# Patient Record
Sex: FEMALE | Race: WHITE | Employment: UNEMPLOYED | URBAN - METROPOLITAN AREA
[De-identification: names, ages, dates, MRNs, and addresses within clinical notes are randomized per-mention and may not be internally consistent; named-entity substitution may affect disease eponyms.]

---

## 2017-02-19 ENCOUNTER — HOSPITAL ENCOUNTER (INPATIENT)
Age: 27
LOS: 2 days | Discharge: HOME | DRG: 566 | End: 2017-02-21
Attending: EMERGENCY MEDICINE | Admitting: OBSTETRICS & GYNECOLOGY
Payer: MEDICARE

## 2017-02-19 DIAGNOSIS — F11.93 OPIATE WITHDRAWAL (HCC): Primary | ICD-10-CM

## 2017-02-19 DIAGNOSIS — Z3A.22 22 WEEKS GESTATION OF PREGNANCY: ICD-10-CM

## 2017-02-19 LAB
ABSOLUTE EOS #: 0 K/UL (ref 0–0.4)
ABSOLUTE LYMPH #: 0.8 K/UL (ref 1–4.8)
ABSOLUTE MONO #: 0.4 K/UL (ref 0.1–1.2)
ALBUMIN SERPL-MCNC: 3.7 G/DL (ref 3.5–5.2)
ALBUMIN/GLOBULIN RATIO: 1.4 (ref 1–2.5)
ALP BLD-CCNC: 103 U/L (ref 35–104)
ALT SERPL-CCNC: 15 U/L (ref 5–33)
AMPHETAMINE SCREEN URINE: NEGATIVE
ANION GAP SERPL CALCULATED.3IONS-SCNC: 14 MMOL/L (ref 9–17)
AST SERPL-CCNC: 15 U/L
BARBITURATE SCREEN URINE: NEGATIVE
BASOPHILS # BLD: 0 % (ref 0–2)
BASOPHILS ABSOLUTE: 0 K/UL (ref 0–0.2)
BENZODIAZEPINE SCREEN, URINE: POSITIVE
BILIRUB SERPL-MCNC: 0.71 MG/DL (ref 0.3–1.2)
BILIRUBIN DIRECT: 0.28 MG/DL
BILIRUBIN, INDIRECT: 0.43 MG/DL (ref 0–1)
BUN BLDV-MCNC: 4 MG/DL (ref 6–20)
BUN/CREAT BLD: ABNORMAL (ref 9–20)
BUPRENORPHINE URINE: ABNORMAL
CALCIUM SERPL-MCNC: 9.1 MG/DL (ref 8.6–10.4)
CANNABINOID SCREEN URINE: NEGATIVE
CHLORIDE BLD-SCNC: 100 MMOL/L (ref 98–107)
CO2: 24 MMOL/L (ref 20–31)
COCAINE METABOLITE, URINE: NEGATIVE
CREAT SERPL-MCNC: 0.63 MG/DL (ref 0.5–0.9)
DIFFERENTIAL TYPE: ABNORMAL
EOSINOPHILS RELATIVE PERCENT: 0 % (ref 1–4)
GFR AFRICAN AMERICAN: >60 ML/MIN
GFR NON-AFRICAN AMERICAN: >60 ML/MIN
GFR SERPL CREATININE-BSD FRML MDRD: ABNORMAL ML/MIN/{1.73_M2}
GFR SERPL CREATININE-BSD FRML MDRD: ABNORMAL ML/MIN/{1.73_M2}
GLOBULIN: NORMAL G/DL (ref 1.5–3.8)
GLUCOSE BLD-MCNC: 108 MG/DL (ref 70–99)
HCT VFR BLD CALC: 31.2 % (ref 36–46)
HEMOGLOBIN: 11.1 G/DL (ref 12–16)
LIPASE: 24 U/L (ref 13–60)
LYMPHOCYTES # BLD: 9 % (ref 24–44)
MCH RBC QN AUTO: 31.3 PG (ref 26–34)
MCHC RBC AUTO-ENTMCNC: 35.6 G/DL (ref 31–37)
MCV RBC AUTO: 87.8 FL (ref 80–100)
MDMA URINE: ABNORMAL
METHADONE SCREEN, URINE: POSITIVE
METHAMPHETAMINE, URINE: ABNORMAL
MONOCYTES # BLD: 5 % (ref 2–11)
OPIATES, URINE: POSITIVE
OXYCODONE SCREEN URINE: NEGATIVE
PDW BLD-RTO: 13.9 % (ref 12.5–15.4)
PHENCYCLIDINE, URINE: NEGATIVE
PLATELET # BLD: 134 K/UL (ref 140–450)
PLATELET ESTIMATE: ABNORMAL
PMV BLD AUTO: 8.8 FL (ref 6–12)
POTASSIUM SERPL-SCNC: 3.2 MMOL/L (ref 3.7–5.3)
PROPOXYPHENE, URINE: ABNORMAL
RBC # BLD: 3.55 M/UL (ref 4–5.2)
RBC # BLD: ABNORMAL 10*6/UL
SEG NEUTROPHILS: 86 % (ref 36–66)
SEGMENTED NEUTROPHILS ABSOLUTE COUNT: 7.3 K/UL (ref 1.8–7.7)
SODIUM BLD-SCNC: 138 MMOL/L (ref 135–144)
TEST INFORMATION: ABNORMAL
TOTAL PROTEIN: 6.4 G/DL (ref 6.4–8.3)
TRICYCLIC ANTIDEPRESSANTS, UR: ABNORMAL
WBC # BLD: 8.5 K/UL (ref 3.5–11)
WBC # BLD: ABNORMAL 10*3/UL

## 2017-02-19 PROCEDURE — 2580000003 HC RX 258: Performed by: EMERGENCY MEDICINE

## 2017-02-19 PROCEDURE — 85025 COMPLETE CBC W/AUTO DIFF WBC: CPT

## 2017-02-19 PROCEDURE — 99285 EMERGENCY DEPT VISIT HI MDM: CPT

## 2017-02-19 PROCEDURE — 81001 URINALYSIS AUTO W/SCOPE: CPT

## 2017-02-19 PROCEDURE — 80076 HEPATIC FUNCTION PANEL: CPT

## 2017-02-19 PROCEDURE — 6360000002 HC RX W HCPCS: Performed by: EMERGENCY MEDICINE

## 2017-02-19 PROCEDURE — 80048 BASIC METABOLIC PNL TOTAL CA: CPT

## 2017-02-19 PROCEDURE — 1220000000 HC SEMI PRIVATE OB R&B

## 2017-02-19 PROCEDURE — 80307 DRUG TEST PRSMV CHEM ANLYZR: CPT

## 2017-02-19 PROCEDURE — 83690 ASSAY OF LIPASE: CPT

## 2017-02-19 RX ORDER — OMEPRAZOLE 10 MG/1
10 CAPSULE, DELAYED RELEASE ORAL
Status: DISCONTINUED | OUTPATIENT
Start: 2017-02-20 | End: 2017-02-20 | Stop reason: SDUPTHER

## 2017-02-19 RX ORDER — ONDANSETRON 2 MG/ML
4 INJECTION INTRAMUSCULAR; INTRAVENOUS EVERY 6 HOURS PRN
Status: DISCONTINUED | OUTPATIENT
Start: 2017-02-19 | End: 2017-02-21 | Stop reason: HOSPADM

## 2017-02-19 RX ORDER — ONDANSETRON 2 MG/ML
4 INJECTION INTRAMUSCULAR; INTRAVENOUS ONCE
Status: COMPLETED | OUTPATIENT
Start: 2017-02-19 | End: 2017-02-19

## 2017-02-19 RX ORDER — POTASSIUM CHLORIDE 7.45 MG/ML
10 INJECTION INTRAVENOUS PRN
Status: DISCONTINUED | OUTPATIENT
Start: 2017-02-19 | End: 2017-02-21 | Stop reason: HOSPADM

## 2017-02-19 RX ORDER — OXYCODONE HYDROCHLORIDE AND ACETAMINOPHEN 5; 325 MG/1; MG/1
1 TABLET ORAL EVERY 4 HOURS PRN
Status: DISCONTINUED | OUTPATIENT
Start: 2017-02-19 | End: 2017-02-20

## 2017-02-19 RX ORDER — LOPERAMIDE HYDROCHLORIDE 2 MG/1
2 CAPSULE ORAL 4 TIMES DAILY PRN
Status: DISCONTINUED | OUTPATIENT
Start: 2017-02-19 | End: 2017-02-21 | Stop reason: HOSPADM

## 2017-02-19 RX ORDER — NAPROXEN 250 MG/1
250 TABLET ORAL
Status: DISCONTINUED | OUTPATIENT
Start: 2017-02-20 | End: 2017-02-20

## 2017-02-19 RX ORDER — DICYCLOMINE HYDROCHLORIDE 10 MG/1
20 CAPSULE ORAL EVERY 6 HOURS PRN
Status: DISCONTINUED | OUTPATIENT
Start: 2017-02-19 | End: 2017-02-21 | Stop reason: HOSPADM

## 2017-02-19 RX ORDER — CYCLOBENZAPRINE HCL 10 MG
10 TABLET ORAL 3 TIMES DAILY PRN
Status: DISCONTINUED | OUTPATIENT
Start: 2017-02-19 | End: 2017-02-21 | Stop reason: HOSPADM

## 2017-02-19 RX ORDER — 0.9 % SODIUM CHLORIDE 0.9 %
1000 INTRAVENOUS SOLUTION INTRAVENOUS ONCE
Status: COMPLETED | OUTPATIENT
Start: 2017-02-19 | End: 2017-02-19

## 2017-02-19 RX ORDER — SODIUM CHLORIDE 0.9 % (FLUSH) 0.9 %
10 SYRINGE (ML) INJECTION EVERY 12 HOURS SCHEDULED
Status: DISCONTINUED | OUTPATIENT
Start: 2017-02-19 | End: 2017-02-21 | Stop reason: HOSPADM

## 2017-02-19 RX ORDER — ACETAMINOPHEN 325 MG/1
650 TABLET ORAL EVERY 4 HOURS PRN
Status: DISCONTINUED | OUTPATIENT
Start: 2017-02-19 | End: 2017-02-21 | Stop reason: HOSPADM

## 2017-02-19 RX ORDER — ALBUTEROL SULFATE 90 UG/1
2 AEROSOL, METERED RESPIRATORY (INHALATION) EVERY 6 HOURS PRN
Status: DISCONTINUED | OUTPATIENT
Start: 2017-02-19 | End: 2017-02-21 | Stop reason: HOSPADM

## 2017-02-19 RX ORDER — SODIUM CHLORIDE 0.9 % (FLUSH) 0.9 %
10 SYRINGE (ML) INJECTION PRN
Status: DISCONTINUED | OUTPATIENT
Start: 2017-02-19 | End: 2017-02-21 | Stop reason: HOSPADM

## 2017-02-19 RX ADMIN — ONDANSETRON 4 MG: 2 INJECTION, SOLUTION INTRAMUSCULAR; INTRAVENOUS at 22:00

## 2017-02-19 RX ADMIN — SODIUM CHLORIDE 1000 ML: 9 INJECTION, SOLUTION INTRAVENOUS at 21:57

## 2017-02-20 PROBLEM — N39.0 UTI (URINARY TRACT INFECTION): Status: ACTIVE | Noted: 2017-02-20

## 2017-02-20 LAB
-: ABNORMAL
AMORPHOUS: ABNORMAL
ANION GAP SERPL CALCULATED.3IONS-SCNC: 10 MMOL/L (ref 9–17)
BACTERIA: ABNORMAL
BILIRUBIN URINE: NEGATIVE
BUN BLDV-MCNC: 6 MG/DL (ref 6–20)
BUN/CREAT BLD: ABNORMAL (ref 9–20)
CALCIUM SERPL-MCNC: 7.8 MG/DL (ref 8.6–10.4)
CASTS UA: ABNORMAL /LPF (ref 0–8)
CHLORIDE BLD-SCNC: 104 MMOL/L (ref 98–107)
CO2: 22 MMOL/L (ref 20–31)
COLOR: ABNORMAL
CREAT SERPL-MCNC: 0.64 MG/DL (ref 0.5–0.9)
CRYSTALS, UA: ABNORMAL /HPF
EPITHELIAL CELLS UA: ABNORMAL /HPF (ref 0–5)
GFR AFRICAN AMERICAN: >60 ML/MIN
GFR NON-AFRICAN AMERICAN: >60 ML/MIN
GFR SERPL CREATININE-BSD FRML MDRD: ABNORMAL ML/MIN/{1.73_M2}
GFR SERPL CREATININE-BSD FRML MDRD: ABNORMAL ML/MIN/{1.73_M2}
GLUCOSE BLD-MCNC: 94 MG/DL (ref 70–99)
GLUCOSE URINE: NEGATIVE
KETONES, URINE: ABNORMAL
LEUKOCYTE ESTERASE, URINE: ABNORMAL
MUCUS: ABNORMAL
NITRITE, URINE: POSITIVE
OTHER OBSERVATIONS UA: ABNORMAL
PH UA: 6 (ref 5–8)
POTASSIUM SERPL-SCNC: 3.9 MMOL/L (ref 3.7–5.3)
PROTEIN UA: ABNORMAL
RBC UA: ABNORMAL /HPF (ref 0–4)
RENAL EPITHELIAL, UA: ABNORMAL /HPF
SODIUM BLD-SCNC: 136 MMOL/L (ref 135–144)
SPECIFIC GRAVITY UA: 1.02 (ref 1–1.03)
TRICHOMONAS: ABNORMAL
TURBIDITY: ABNORMAL
URINE HGB: ABNORMAL
UROBILINOGEN, URINE: NORMAL
WBC UA: ABNORMAL /HPF (ref 0–5)
YEAST: ABNORMAL

## 2017-02-20 PROCEDURE — 2580000003 HC RX 258: Performed by: STUDENT IN AN ORGANIZED HEALTH CARE EDUCATION/TRAINING PROGRAM

## 2017-02-20 PROCEDURE — 86900 BLOOD TYPING SEROLOGIC ABO: CPT

## 2017-02-20 PROCEDURE — 1220000000 HC SEMI PRIVATE OB R&B

## 2017-02-20 PROCEDURE — 86762 RUBELLA ANTIBODY: CPT

## 2017-02-20 PROCEDURE — 6360000002 HC RX W HCPCS: Performed by: STUDENT IN AN ORGANIZED HEALTH CARE EDUCATION/TRAINING PROGRAM

## 2017-02-20 PROCEDURE — 6370000000 HC RX 637 (ALT 250 FOR IP): Performed by: STUDENT IN AN ORGANIZED HEALTH CARE EDUCATION/TRAINING PROGRAM

## 2017-02-20 PROCEDURE — 99222 1ST HOSP IP/OBS MODERATE 55: CPT | Performed by: OBSTETRICS & GYNECOLOGY

## 2017-02-20 PROCEDURE — 80048 BASIC METABOLIC PNL TOTAL CA: CPT

## 2017-02-20 PROCEDURE — 85025 COMPLETE CBC W/AUTO DIFF WBC: CPT

## 2017-02-20 PROCEDURE — 86780 TREPONEMA PALLIDUM: CPT

## 2017-02-20 PROCEDURE — 87389 HIV-1 AG W/HIV-1&-2 AB AG IA: CPT

## 2017-02-20 PROCEDURE — 87522 HEPATITIS C REVRS TRNSCRPJ: CPT

## 2017-02-20 PROCEDURE — 6370000000 HC RX 637 (ALT 250 FOR IP): Performed by: OBSTETRICS & GYNECOLOGY

## 2017-02-20 PROCEDURE — 86850 RBC ANTIBODY SCREEN: CPT

## 2017-02-20 PROCEDURE — 36415 COLL VENOUS BLD VENIPUNCTURE: CPT

## 2017-02-20 PROCEDURE — 87340 HEPATITIS B SURFACE AG IA: CPT

## 2017-02-20 PROCEDURE — 86901 BLOOD TYPING SEROLOGIC RH(D): CPT

## 2017-02-20 RX ORDER — POTASSIUM CHLORIDE 20 MEQ/1
40 TABLET, EXTENDED RELEASE ORAL ONCE
Status: COMPLETED | OUTPATIENT
Start: 2017-02-20 | End: 2017-02-20

## 2017-02-20 RX ORDER — METHADONE HYDROCHLORIDE 5 MG/5ML
35 SOLUTION ORAL DAILY
Status: DISCONTINUED | OUTPATIENT
Start: 2017-02-20 | End: 2017-02-20

## 2017-02-20 RX ORDER — PANTOPRAZOLE SODIUM 40 MG/1
40 TABLET, DELAYED RELEASE ORAL
Status: DISCONTINUED | OUTPATIENT
Start: 2017-02-20 | End: 2017-02-21 | Stop reason: HOSPADM

## 2017-02-20 RX ORDER — POTASSIUM CHLORIDE 20 MEQ/1
10 TABLET, EXTENDED RELEASE ORAL 2 TIMES DAILY
Status: DISCONTINUED | OUTPATIENT
Start: 2017-02-20 | End: 2017-02-20

## 2017-02-20 RX ORDER — NAPROXEN 250 MG/1
250 TABLET ORAL 3 TIMES DAILY PRN
Status: DISCONTINUED | OUTPATIENT
Start: 2017-02-20 | End: 2017-02-21 | Stop reason: HOSPADM

## 2017-02-20 RX ORDER — 0.9 % SODIUM CHLORIDE 0.9 %
1000 INTRAVENOUS SOLUTION INTRAVENOUS ONCE
Status: COMPLETED | OUTPATIENT
Start: 2017-02-20 | End: 2017-02-20

## 2017-02-20 RX ORDER — SODIUM CHLORIDE 9 MG/ML
125 INJECTION, SOLUTION INTRAVENOUS CONTINUOUS
Status: DISCONTINUED | OUTPATIENT
Start: 2017-02-20 | End: 2017-02-21 | Stop reason: HOSPADM

## 2017-02-20 RX ORDER — METHADONE HYDROCHLORIDE 5 MG/5ML
88 SOLUTION ORAL DAILY
Status: DISCONTINUED | OUTPATIENT
Start: 2017-02-20 | End: 2017-02-20

## 2017-02-20 RX ORDER — METHADONE HYDROCHLORIDE 5 MG/5ML
84 SOLUTION ORAL DAILY
Status: DISCONTINUED | OUTPATIENT
Start: 2017-02-20 | End: 2017-02-21 | Stop reason: HOSPADM

## 2017-02-20 RX ORDER — NITROFURANTOIN 25; 75 MG/1; MG/1
100 CAPSULE ORAL EVERY 12 HOURS SCHEDULED
Status: DISCONTINUED | OUTPATIENT
Start: 2017-02-20 | End: 2017-02-21 | Stop reason: HOSPADM

## 2017-02-20 RX ADMIN — Medication 84 MG: at 10:39

## 2017-02-20 RX ADMIN — NAPROXEN 250 MG: 250 TABLET ORAL at 08:13

## 2017-02-20 RX ADMIN — Medication 10 ML: at 00:57

## 2017-02-20 RX ADMIN — SODIUM CHLORIDE 125 ML/HR: 9 INJECTION, SOLUTION INTRAVENOUS at 11:33

## 2017-02-20 RX ADMIN — ONDANSETRON 4 MG: 2 INJECTION, SOLUTION INTRAMUSCULAR; INTRAVENOUS at 01:05

## 2017-02-20 RX ADMIN — NITROFURANTOIN MONOHYDRATE AND NITROFURANTOIN MACROCRYSTALLINE 100 MG: 75; 25 CAPSULE ORAL at 20:35

## 2017-02-20 RX ADMIN — LOPERAMIDE HYDROCHLORIDE 2 MG: 2 CAPSULE ORAL at 16:39

## 2017-02-20 RX ADMIN — OXYCODONE HYDROCHLORIDE AND ACETAMINOPHEN 1 TABLET: 5; 325 TABLET ORAL at 05:08

## 2017-02-20 RX ADMIN — OXYCODONE HYDROCHLORIDE AND ACETAMINOPHEN 1 TABLET: 5; 325 TABLET ORAL at 00:54

## 2017-02-20 RX ADMIN — POTASSIUM CHLORIDE 10 MEQ: 1500 TABLET, EXTENDED RELEASE ORAL at 16:36

## 2017-02-20 RX ADMIN — PANTOPRAZOLE SODIUM 40 MG: 40 TABLET, DELAYED RELEASE ORAL at 08:14

## 2017-02-20 RX ADMIN — Medication 10 ML: at 08:14

## 2017-02-20 RX ADMIN — DICYCLOMINE HYDROCHLORIDE 20 MG: 10 CAPSULE ORAL at 09:05

## 2017-02-20 RX ADMIN — DICYCLOMINE HYDROCHLORIDE 20 MG: 10 CAPSULE ORAL at 03:12

## 2017-02-20 RX ADMIN — CYCLOBENZAPRINE HYDROCHLORIDE 10 MG: 10 TABLET, FILM COATED ORAL at 03:12

## 2017-02-20 RX ADMIN — OXYCODONE HYDROCHLORIDE AND ACETAMINOPHEN 1 TABLET: 5; 325 TABLET ORAL at 09:07

## 2017-02-20 RX ADMIN — NITROFURANTOIN MONOHYDRATE AND NITROFURANTOIN MACROCRYSTALLINE 100 MG: 75; 25 CAPSULE ORAL at 08:14

## 2017-02-20 RX ADMIN — POTASSIUM CHLORIDE 40 MEQ: 1500 TABLET, EXTENDED RELEASE ORAL at 18:34

## 2017-02-20 RX ADMIN — SODIUM CHLORIDE 1000 ML: 9 INJECTION, SOLUTION INTRAVENOUS at 18:21

## 2017-02-20 ASSESSMENT — ENCOUNTER SYMPTOMS
ABDOMINAL PAIN: 1
COUGH: 0
PHOTOPHOBIA: 0
NAUSEA: 1
CONSTIPATION: 0
SHORTNESS OF BREATH: 0
DIARRHEA: 1
VOMITING: 1
CHEST TIGHTNESS: 0

## 2017-02-20 ASSESSMENT — PAIN SCALES - GENERAL
PAINLEVEL_OUTOF10: 10

## 2017-02-21 VITALS
HEIGHT: 62 IN | TEMPERATURE: 98.2 F | RESPIRATION RATE: 16 BRPM | WEIGHT: 200 LBS | BODY MASS INDEX: 36.8 KG/M2 | OXYGEN SATURATION: 96 % | SYSTOLIC BLOOD PRESSURE: 84 MMHG | DIASTOLIC BLOOD PRESSURE: 45 MMHG | HEART RATE: 80 BPM

## 2017-02-21 LAB
ABO/RH: NORMAL
ABSOLUTE EOS #: 0 K/UL (ref 0–0.4)
ABSOLUTE LYMPH #: 1 K/UL (ref 1–4.8)
ABSOLUTE MONO #: 0.7 K/UL (ref 0.1–1.2)
ANTIBODY SCREEN: NEGATIVE
BASOPHILS # BLD: 0 % (ref 0–2)
BASOPHILS ABSOLUTE: 0 K/UL (ref 0–0.2)
DIFFERENTIAL TYPE: ABNORMAL
EOSINOPHILS RELATIVE PERCENT: 1 % (ref 1–4)
HCT VFR BLD CALC: 22.8 % (ref 36–46)
HEMOGLOBIN: 8.2 G/DL (ref 12–16)
HEPATITIS B SURFACE ANTIGEN: NONREACTIVE
HIV AG/AB: NONREACTIVE
LYMPHOCYTES # BLD: 17 % (ref 24–44)
MCH RBC QN AUTO: 31.1 PG (ref 26–34)
MCHC RBC AUTO-ENTMCNC: 35.8 G/DL (ref 31–37)
MCV RBC AUTO: 86.6 FL (ref 80–100)
MONOCYTES # BLD: 12 % (ref 2–11)
PDW BLD-RTO: 13.9 % (ref 12.5–15.4)
PLATELET # BLD: 112 K/UL (ref 140–450)
PLATELET ESTIMATE: ABNORMAL
PMV BLD AUTO: 9.1 FL (ref 6–12)
RBC # BLD: 2.64 M/UL (ref 4–5.2)
RBC # BLD: ABNORMAL 10*6/UL
RUBV IGG SER QL: 24 IU/ML
SEG NEUTROPHILS: 70 % (ref 36–66)
SEGMENTED NEUTROPHILS ABSOLUTE COUNT: 4.1 K/UL (ref 1.8–7.7)
T. PALLIDUM, IGG: NONREACTIVE
WBC # BLD: 5.9 K/UL (ref 3.5–11)
WBC # BLD: ABNORMAL 10*3/UL

## 2017-02-21 PROCEDURE — 6370000000 HC RX 637 (ALT 250 FOR IP): Performed by: STUDENT IN AN ORGANIZED HEALTH CARE EDUCATION/TRAINING PROGRAM

## 2017-02-21 PROCEDURE — 96374 THER/PROPH/DIAG INJ IV PUSH: CPT

## 2017-02-21 PROCEDURE — 96361 HYDRATE IV INFUSION ADD-ON: CPT

## 2017-02-21 PROCEDURE — 99238 HOSP IP/OBS DSCHRG MGMT 30/<: CPT | Performed by: OBSTETRICS & GYNECOLOGY

## 2017-02-21 RX ORDER — NITROFURANTOIN 25; 75 MG/1; MG/1
100 CAPSULE ORAL EVERY 12 HOURS SCHEDULED
Qty: 20 CAPSULE | Refills: 0 | Status: SHIPPED | OUTPATIENT
Start: 2017-02-21 | End: 2017-02-27 | Stop reason: ALTCHOICE

## 2017-02-21 RX ORDER — CEPHALEXIN 500 MG/1
500 CAPSULE ORAL 4 TIMES DAILY
Qty: 40 CAPSULE | Refills: 0 | Status: SHIPPED | OUTPATIENT
Start: 2017-02-21 | End: 2017-02-21 | Stop reason: HOSPADM

## 2017-02-21 RX ADMIN — NITROFURANTOIN MONOHYDRATE AND NITROFURANTOIN MACROCRYSTALLINE 100 MG: 75; 25 CAPSULE ORAL at 09:21

## 2017-02-21 RX ADMIN — PANTOPRAZOLE SODIUM 40 MG: 40 TABLET, DELAYED RELEASE ORAL at 08:02

## 2017-02-22 LAB
DIRECT EXAM: ABNORMAL
Lab: ABNORMAL
SPECIMEN DESCRIPTION: ABNORMAL
STATUS: ABNORMAL

## 2017-02-27 ENCOUNTER — OFFICE VISIT (OUTPATIENT)
Dept: OBGYN | Facility: CLINIC | Age: 27
End: 2017-02-27

## 2017-02-27 ENCOUNTER — HOSPITAL ENCOUNTER (OUTPATIENT)
Age: 27
Setting detail: SPECIMEN
Discharge: HOME | End: 2017-02-27
Payer: MEDICARE

## 2017-02-27 VITALS
DIASTOLIC BLOOD PRESSURE: 57 MMHG | RESPIRATION RATE: 18 BRPM | WEIGHT: 198 LBS | SYSTOLIC BLOOD PRESSURE: 104 MMHG | HEIGHT: 64 IN | HEART RATE: 74 BPM | BODY MASS INDEX: 33.8 KG/M2

## 2017-02-27 DIAGNOSIS — F19.10 DRUG ABUSE (HCC): Primary | ICD-10-CM

## 2017-02-27 DIAGNOSIS — Z3A.23 23 WEEKS GESTATION OF PREGNANCY: ICD-10-CM

## 2017-02-27 DIAGNOSIS — O09.32 LATE PRENATAL CARE AFFECTING PREGNANCY IN SECOND TRIMESTER: ICD-10-CM

## 2017-02-27 LAB
AMPHETAMINE SCREEN URINE: NEGATIVE
BARBITURATE SCREEN URINE: NEGATIVE
BENZODIAZEPINE SCREEN, URINE: NEGATIVE
BUPRENORPHINE URINE: ABNORMAL
CANNABINOID SCREEN URINE: NEGATIVE
COCAINE METABOLITE, URINE: NEGATIVE
MDMA URINE: ABNORMAL
METHADONE SCREEN, URINE: POSITIVE
METHAMPHETAMINE, URINE: ABNORMAL
OPIATES, URINE: NEGATIVE
OXYCODONE SCREEN URINE: NEGATIVE
PHENCYCLIDINE, URINE: NEGATIVE
PROPOXYPHENE, URINE: ABNORMAL
TEST INFORMATION: ABNORMAL
TRICYCLIC ANTIDEPRESSANTS, UR: ABNORMAL

## 2017-02-27 PROCEDURE — 99213 OFFICE O/P EST LOW 20 MIN: CPT | Performed by: SPECIALIST

## 2017-02-27 RX ORDER — VITAMIN A ACETATE, .BETA.-CAROTENE, ASCORBIC ACID, CHOLECALCIFEROL, .ALPHA.-TOCOPHEROL ACETATE, DL-, THIAMINE MONONITRATE, RIBOFLAVIN, NIACINAMIDE, PYRIDOXINE HYDROCHLORIDE, FOLIC ACID, CYANOCOBALAMIN, CALCIUM CARBONATE, FERROUS FUMARATE, ZINC OXIDE, AND CUPRIC OXIDE 2000; 2000; 120; 400; 22; 1.84; 3; 20; 10; 1; 12; 200; 27; 25; 2 [IU]/1; [IU]/1; MG/1; [IU]/1; MG/1; MG/1; MG/1; MG/1; MG/1; MG/1; UG/1; MG/1; MG/1; MG/1; MG/1
1 TABLET ORAL DAILY
Qty: 30 TABLET | Refills: 11 | Status: SHIPPED | OUTPATIENT
Start: 2017-02-27 | End: 2017-06-30 | Stop reason: ALTCHOICE

## 2017-02-27 RX ORDER — PROMETHAZINE HYDROCHLORIDE 25 MG/1
25 TABLET ORAL EVERY 6 HOURS PRN
Qty: 30 TABLET | Refills: 1 | Status: SHIPPED | OUTPATIENT
Start: 2017-02-27 | End: 2017-07-31 | Stop reason: SDUPTHER

## 2017-02-27 ASSESSMENT — ENCOUNTER SYMPTOMS
DIARRHEA: 0
EYE PAIN: 0
VOMITING: 0
CONSTIPATION: 0
APNEA: 0
COUGH: 0
NAUSEA: 0
ABDOMINAL DISTENTION: 0
ABDOMINAL PAIN: 0

## 2017-03-03 ENCOUNTER — PROCEDURE VISIT (OUTPATIENT)
Dept: OBGYN | Facility: CLINIC | Age: 27
End: 2017-03-03

## 2017-03-03 DIAGNOSIS — Z36.89 ENCOUNTER FOR FETAL ANATOMIC SURVEY: Primary | ICD-10-CM

## 2017-03-03 DIAGNOSIS — Z3A.23 23 WEEKS GESTATION OF PREGNANCY: ICD-10-CM

## 2017-03-03 DIAGNOSIS — O09.32 LATE PRENATAL CARE AFFECTING PREGNANCY IN SECOND TRIMESTER: ICD-10-CM

## 2017-03-03 DIAGNOSIS — Z3A.24 24 WEEKS GESTATION OF PREGNANCY: ICD-10-CM

## 2017-03-03 PROCEDURE — 76805 OB US >/= 14 WKS SNGL FETUS: CPT | Performed by: SPECIALIST

## 2017-03-09 ENCOUNTER — INITIAL PRENATAL (OUTPATIENT)
Dept: OBGYN | Facility: CLINIC | Age: 27
End: 2017-03-09

## 2017-03-09 ENCOUNTER — HOSPITAL ENCOUNTER (OUTPATIENT)
Age: 27
Setting detail: SPECIMEN
Discharge: HOME | End: 2017-03-09
Payer: MEDICARE

## 2017-03-09 VITALS
HEART RATE: 86 BPM | SYSTOLIC BLOOD PRESSURE: 131 MMHG | BODY MASS INDEX: 33.64 KG/M2 | WEIGHT: 196 LBS | DIASTOLIC BLOOD PRESSURE: 75 MMHG

## 2017-03-09 DIAGNOSIS — O99.323 METHADONE MAINTENANCE TREATMENT AFFECTING PREGNANCY IN THIRD TRIMESTER (HCC): ICD-10-CM

## 2017-03-09 DIAGNOSIS — J45.20 MILD INTERMITTENT ASTHMA WITHOUT COMPLICATION: ICD-10-CM

## 2017-03-09 DIAGNOSIS — O34.42 HISTORY OF CERVICAL LEEP BIOPSY AFFECTING CARE OF MOTHER, ANTEPARTUM, SECOND TRIMESTER: ICD-10-CM

## 2017-03-09 DIAGNOSIS — F19.10 DRUG ABUSE (HCC): ICD-10-CM

## 2017-03-09 DIAGNOSIS — B19.20 HEPATITIS C VIRUS INFECTION, UNSPECIFIED CHRONICITY: ICD-10-CM

## 2017-03-09 DIAGNOSIS — F11.20 METHADONE MAINTENANCE TREATMENT AFFECTING PREGNANCY IN THIRD TRIMESTER (HCC): ICD-10-CM

## 2017-03-09 DIAGNOSIS — Z3A.25 25 WEEKS GESTATION OF PREGNANCY: ICD-10-CM

## 2017-03-09 DIAGNOSIS — O09.92 HRP (HIGH RISK PREGNANCY), SECOND TRIMESTER: Primary | ICD-10-CM

## 2017-03-09 LAB
ABSOLUTE EOS #: 0.2 K/UL (ref 0–0.4)
ABSOLUTE LYMPH #: 1.5 K/UL (ref 1–4.8)
ABSOLUTE MONO #: 0.4 K/UL (ref 0.1–1.2)
BASOPHILS # BLD: 0 % (ref 0–2)
BASOPHILS ABSOLUTE: 0 K/UL (ref 0–0.2)
BILIRUBIN URINE: NEGATIVE
COLOR: YELLOW
COMMENT UA: NORMAL
DIFFERENTIAL TYPE: ABNORMAL
EOSINOPHILS RELATIVE PERCENT: 3 % (ref 1–4)
GLUCOSE URINE: NEGATIVE
HCT VFR BLD CALC: 28 % (ref 36–46)
HEMOGLOBIN: 9.7 G/DL (ref 12–16)
HEPATITIS B SURFACE ANTIGEN: NONREACTIVE
HIV AG/AB: NONREACTIVE
KETONES, URINE: NEGATIVE
LEUKOCYTE ESTERASE, URINE: NEGATIVE
LYMPHOCYTES # BLD: 24 % (ref 24–44)
MCH RBC QN AUTO: 31.2 PG (ref 26–34)
MCHC RBC AUTO-ENTMCNC: 34.5 G/DL (ref 31–37)
MCV RBC AUTO: 90.5 FL (ref 80–100)
MONOCYTES # BLD: 7 % (ref 2–11)
NITRITE, URINE: NEGATIVE
PDW BLD-RTO: 14.5 % (ref 12.5–15.4)
PH UA: 7 (ref 5–8)
PLATELET # BLD: 99 K/UL (ref 140–450)
PLATELET ESTIMATE: ABNORMAL
PMV BLD AUTO: 9.6 FL (ref 6–12)
PROTEIN UA: NEGATIVE
RBC # BLD: 3.1 M/UL (ref 4–5.2)
RBC # BLD: ABNORMAL 10*6/UL
RUBV IGG SER QL: 36.2 IU/ML
SEG NEUTROPHILS: 66 % (ref 36–66)
SEGMENTED NEUTROPHILS ABSOLUTE COUNT: 4.2 K/UL (ref 1.8–7.7)
SICKLE CELL SCREEN: NEGATIVE
SPECIFIC GRAVITY UA: 1.02 (ref 1–1.03)
T. PALLIDUM, IGG: NONREACTIVE
TSH SERPL DL<=0.05 MIU/L-ACNC: 0.9 MIU/L (ref 0.3–5)
TURBIDITY: CLEAR
URINE HGB: NEGATIVE
UROBILINOGEN, URINE: NORMAL
WBC # BLD: 6.3 K/UL (ref 3.5–11)
WBC # BLD: ABNORMAL 10*3/UL

## 2017-03-09 PROCEDURE — 99211 OFF/OP EST MAY X REQ PHY/QHP: CPT | Performed by: SPECIALIST

## 2017-03-09 PROCEDURE — H1000 PRENATAL CARE ATRISK ASSESSM: HCPCS | Performed by: SPECIALIST

## 2017-03-10 LAB
ABO/RH: NORMAL
ANTIBODY SCREEN: NEGATIVE
C. TRACHOMATIS DNA ,URINE: NEGATIVE
N. GONORRHOEAE DNA, URINE: NEGATIVE

## 2017-03-16 ENCOUNTER — ROUTINE PRENATAL (OUTPATIENT)
Dept: OBGYN CLINIC | Age: 27
End: 2017-03-16
Payer: MEDICARE

## 2017-03-16 VITALS
SYSTOLIC BLOOD PRESSURE: 112 MMHG | DIASTOLIC BLOOD PRESSURE: 68 MMHG | WEIGHT: 196 LBS | HEART RATE: 85 BPM | BODY MASS INDEX: 33.64 KG/M2

## 2017-03-16 DIAGNOSIS — Z67.91 RH NEGATIVE STATUS DURING PREGNANCY, SECOND TRIMESTER: Primary | ICD-10-CM

## 2017-03-16 DIAGNOSIS — O26.892 RH NEGATIVE STATUS DURING PREGNANCY, SECOND TRIMESTER: Primary | ICD-10-CM

## 2017-03-16 DIAGNOSIS — O09.93 HRP (HIGH RISK PREGNANCY), THIRD TRIMESTER: ICD-10-CM

## 2017-03-16 DIAGNOSIS — F11.20 METHADONE MAINTENANCE TREATMENT AFFECTING PREGNANCY IN THIRD TRIMESTER (HCC): ICD-10-CM

## 2017-03-16 DIAGNOSIS — O99.323 METHADONE MAINTENANCE TREATMENT AFFECTING PREGNANCY IN THIRD TRIMESTER (HCC): ICD-10-CM

## 2017-03-16 DIAGNOSIS — J45.20 MILD INTERMITTENT ASTHMA WITHOUT COMPLICATION: ICD-10-CM

## 2017-03-16 PROCEDURE — 99213 OFFICE O/P EST LOW 20 MIN: CPT | Performed by: SPECIALIST

## 2017-03-23 ENCOUNTER — ROUTINE PRENATAL (OUTPATIENT)
Dept: OBGYN CLINIC | Age: 27
End: 2017-03-23
Payer: MEDICARE

## 2017-03-23 VITALS
HEART RATE: 77 BPM | WEIGHT: 199 LBS | DIASTOLIC BLOOD PRESSURE: 63 MMHG | SYSTOLIC BLOOD PRESSURE: 110 MMHG | BODY MASS INDEX: 34.16 KG/M2

## 2017-03-23 DIAGNOSIS — Z3A.27 27 WEEKS GESTATION OF PREGNANCY: ICD-10-CM

## 2017-03-23 DIAGNOSIS — Z23 NEED FOR PROPHYLACTIC VACCINATION WITH COMBINED DIPHTHERIA-TETANUS-PERTUSSIS (DTP) VACCINE: Primary | ICD-10-CM

## 2017-03-23 PROCEDURE — 90471 IMMUNIZATION ADMIN: CPT | Performed by: SPECIALIST

## 2017-03-23 PROCEDURE — 99213 OFFICE O/P EST LOW 20 MIN: CPT | Performed by: SPECIALIST

## 2017-03-23 PROCEDURE — 90715 TDAP VACCINE 7 YRS/> IM: CPT | Performed by: SPECIALIST

## 2017-03-30 ENCOUNTER — ROUTINE PRENATAL (OUTPATIENT)
Dept: OBGYN CLINIC | Age: 27
End: 2017-03-30
Payer: MEDICARE

## 2017-03-30 VITALS
HEART RATE: 66 BPM | SYSTOLIC BLOOD PRESSURE: 110 MMHG | DIASTOLIC BLOOD PRESSURE: 56 MMHG | BODY MASS INDEX: 34.16 KG/M2 | WEIGHT: 199 LBS

## 2017-03-30 DIAGNOSIS — F11.20 METHADONE MAINTENANCE TREATMENT AFFECTING PREGNANCY IN THIRD TRIMESTER (HCC): Primary | ICD-10-CM

## 2017-03-30 DIAGNOSIS — O09.93 HRP (HIGH RISK PREGNANCY), THIRD TRIMESTER: ICD-10-CM

## 2017-03-30 DIAGNOSIS — Z3A.28 28 WEEKS GESTATION OF PREGNANCY: ICD-10-CM

## 2017-03-30 DIAGNOSIS — O99.323 METHADONE MAINTENANCE TREATMENT AFFECTING PREGNANCY IN THIRD TRIMESTER (HCC): Primary | ICD-10-CM

## 2017-03-30 PROCEDURE — 99213 OFFICE O/P EST LOW 20 MIN: CPT | Performed by: SPECIALIST

## 2017-04-06 ENCOUNTER — HOSPITAL ENCOUNTER (OUTPATIENT)
Age: 27
Discharge: HOME | End: 2017-04-06
Attending: SPECIALIST | Admitting: SPECIALIST
Payer: MEDICARE

## 2017-04-06 ENCOUNTER — OFFICE VISIT (OUTPATIENT)
Dept: LABOR AND DELIVERY | Age: 27
End: 2017-04-06
Payer: MEDICARE

## 2017-04-06 ENCOUNTER — ROUTINE PRENATAL (OUTPATIENT)
Dept: OBGYN CLINIC | Age: 27
End: 2017-04-06
Payer: MEDICARE

## 2017-04-06 VITALS
BODY MASS INDEX: 33.81 KG/M2 | SYSTOLIC BLOOD PRESSURE: 110 MMHG | DIASTOLIC BLOOD PRESSURE: 59 MMHG | WEIGHT: 197 LBS | HEART RATE: 85 BPM

## 2017-04-06 DIAGNOSIS — Z98.891 HISTORY OF CESAREAN SECTION: Primary | ICD-10-CM

## 2017-04-06 DIAGNOSIS — Z3A.29 29 WEEKS GESTATION OF PREGNANCY: ICD-10-CM

## 2017-04-06 DIAGNOSIS — O99.343 DEPRESSION DURING PREGNANCY IN THIRD TRIMESTER: ICD-10-CM

## 2017-04-06 DIAGNOSIS — F32.A DEPRESSION DURING PREGNANCY IN THIRD TRIMESTER: ICD-10-CM

## 2017-04-06 DIAGNOSIS — F11.20 METHADONE MAINTENANCE TREATMENT AFFECTING PREGNANCY IN THIRD TRIMESTER (HCC): ICD-10-CM

## 2017-04-06 DIAGNOSIS — O99.323 METHADONE MAINTENANCE TREATMENT AFFECTING PREGNANCY IN THIRD TRIMESTER (HCC): ICD-10-CM

## 2017-04-06 PROCEDURE — 6360000002 HC RX W HCPCS: Performed by: SPECIALIST

## 2017-04-06 PROCEDURE — 86900 BLOOD TYPING SEROLOGIC ABO: CPT

## 2017-04-06 PROCEDURE — 36415 COLL VENOUS BLD VENIPUNCTURE: CPT

## 2017-04-06 PROCEDURE — 86850 RBC ANTIBODY SCREEN: CPT

## 2017-04-06 PROCEDURE — 96372 THER/PROPH/DIAG INJ SC/IM: CPT

## 2017-04-06 PROCEDURE — 86901 BLOOD TYPING SEROLOGIC RH(D): CPT

## 2017-04-06 PROCEDURE — 82950 GLUCOSE TEST: CPT

## 2017-04-06 PROCEDURE — 85025 COMPLETE CBC W/AUTO DIFF WBC: CPT

## 2017-04-06 PROCEDURE — 99213 OFFICE O/P EST LOW 20 MIN: CPT | Performed by: SPECIALIST

## 2017-04-06 RX ADMIN — HUMAN RHO(D) IMMUNE GLOBULIN 300 MCG: 1500 SOLUTION INTRAMUSCULAR; INTRAVENOUS at 17:08

## 2017-04-13 ENCOUNTER — ROUTINE PRENATAL (OUTPATIENT)
Dept: OBGYN CLINIC | Age: 27
End: 2017-04-13
Payer: MEDICARE

## 2017-04-13 ENCOUNTER — HOSPITAL ENCOUNTER (OUTPATIENT)
Age: 27
Setting detail: SPECIMEN
Discharge: HOME | End: 2017-04-13
Payer: MEDICARE

## 2017-04-13 VITALS
WEIGHT: 200 LBS | DIASTOLIC BLOOD PRESSURE: 69 MMHG | SYSTOLIC BLOOD PRESSURE: 117 MMHG | HEART RATE: 84 BPM | BODY MASS INDEX: 34.33 KG/M2

## 2017-04-13 DIAGNOSIS — O99.323 METHADONE MAINTENANCE TREATMENT AFFECTING PREGNANCY IN THIRD TRIMESTER (HCC): ICD-10-CM

## 2017-04-13 DIAGNOSIS — F11.20 METHADONE MAINTENANCE TREATMENT AFFECTING PREGNANCY IN THIRD TRIMESTER (HCC): ICD-10-CM

## 2017-04-13 DIAGNOSIS — Z3A.30 30 WEEKS GESTATION OF PREGNANCY: ICD-10-CM

## 2017-04-13 DIAGNOSIS — O09.93 HRP (HIGH RISK PREGNANCY), THIRD TRIMESTER: Primary | ICD-10-CM

## 2017-04-13 PROBLEM — O09.90 HRP (HIGH RISK PREGNANCY): Status: ACTIVE | Noted: 2017-04-13

## 2017-04-13 LAB
AMPHETAMINE SCREEN URINE: NEGATIVE
BARBITURATE SCREEN URINE: NEGATIVE
BENZODIAZEPINE SCREEN, URINE: NEGATIVE
BUPRENORPHINE URINE: ABNORMAL
CANNABINOID SCREEN URINE: NEGATIVE
COCAINE METABOLITE, URINE: NEGATIVE
MDMA URINE: ABNORMAL
METHADONE SCREEN, URINE: POSITIVE
METHAMPHETAMINE, URINE: ABNORMAL
OPIATES, URINE: NEGATIVE
OXYCODONE SCREEN URINE: NEGATIVE
PHENCYCLIDINE, URINE: POSITIVE
PROPOXYPHENE, URINE: ABNORMAL
TEST INFORMATION: ABNORMAL
TRICYCLIC ANTIDEPRESSANTS, UR: ABNORMAL

## 2017-04-13 PROCEDURE — 99213 OFFICE O/P EST LOW 20 MIN: CPT | Performed by: SPECIALIST

## 2017-04-20 ENCOUNTER — ROUTINE PRENATAL (OUTPATIENT)
Dept: OBGYN CLINIC | Age: 27
End: 2017-04-20
Payer: MEDICARE

## 2017-04-20 VITALS
HEART RATE: 83 BPM | DIASTOLIC BLOOD PRESSURE: 60 MMHG | WEIGHT: 199 LBS | BODY MASS INDEX: 34.16 KG/M2 | SYSTOLIC BLOOD PRESSURE: 103 MMHG

## 2017-04-20 DIAGNOSIS — O09.93 HRP (HIGH RISK PREGNANCY), THIRD TRIMESTER: Primary | ICD-10-CM

## 2017-04-20 DIAGNOSIS — Z3A.31 31 WEEKS GESTATION OF PREGNANCY: ICD-10-CM

## 2017-04-20 PROCEDURE — 99213 OFFICE O/P EST LOW 20 MIN: CPT | Performed by: SPECIALIST

## 2017-04-24 ENCOUNTER — ROUTINE PRENATAL (OUTPATIENT)
Dept: OBGYN CLINIC | Age: 27
End: 2017-04-24
Payer: MEDICARE

## 2017-04-24 VITALS
HEART RATE: 82 BPM | BODY MASS INDEX: 34.16 KG/M2 | WEIGHT: 199 LBS | DIASTOLIC BLOOD PRESSURE: 64 MMHG | SYSTOLIC BLOOD PRESSURE: 105 MMHG

## 2017-04-24 DIAGNOSIS — J45.20 MILD INTERMITTENT ASTHMA WITHOUT COMPLICATION: ICD-10-CM

## 2017-04-24 DIAGNOSIS — F19.10 DRUG ABUSE (HCC): ICD-10-CM

## 2017-04-24 DIAGNOSIS — Z3A.32 32 WEEKS GESTATION OF PREGNANCY: ICD-10-CM

## 2017-04-24 DIAGNOSIS — O09.93 HRP (HIGH RISK PREGNANCY), THIRD TRIMESTER: Primary | ICD-10-CM

## 2017-04-24 DIAGNOSIS — F11.20 METHADONE MAINTENANCE TREATMENT AFFECTING PREGNANCY IN THIRD TRIMESTER (HCC): ICD-10-CM

## 2017-04-24 DIAGNOSIS — O99.323 METHADONE MAINTENANCE TREATMENT AFFECTING PREGNANCY IN THIRD TRIMESTER (HCC): ICD-10-CM

## 2017-04-24 LAB
ACCELERATIONS > 10BPM: NORMAL
ACCELERATIONS > 15 BPM: NORMAL
ACOUSTIC STIMULATION: NORMAL
DECELERATIONS: NORMAL
FHR VARIABILITIES: NORMAL
NST ASSESSMENT: NORMAL
NST BASELINE: NORMAL
NST DURATION: NORMAL MINUTES
NST INDICATIONS: NORMAL
NST LOCATIONS: NORMAL
NST READ BY: NORMAL
NST RETURN: NORMAL
UTERINE ACTIVITY: NORMAL

## 2017-04-24 PROCEDURE — 99213 OFFICE O/P EST LOW 20 MIN: CPT | Performed by: SPECIALIST

## 2017-04-24 PROCEDURE — 59025 FETAL NON-STRESS TEST: CPT | Performed by: SPECIALIST

## 2017-04-27 ENCOUNTER — PROCEDURE VISIT (OUTPATIENT)
Dept: OBGYN CLINIC | Age: 27
End: 2017-04-27
Payer: MEDICARE

## 2017-04-27 DIAGNOSIS — F11.20 METHADONE MAINTENANCE TREATMENT AFFECTING PREGNANCY IN THIRD TRIMESTER (HCC): ICD-10-CM

## 2017-04-27 DIAGNOSIS — J45.20 MILD INTERMITTENT ASTHMA WITHOUT COMPLICATION: ICD-10-CM

## 2017-04-27 DIAGNOSIS — O99.323 METHADONE MAINTENANCE TREATMENT AFFECTING PREGNANCY IN THIRD TRIMESTER (HCC): ICD-10-CM

## 2017-04-27 DIAGNOSIS — F13.10 BENZODIAZEPINE ABUSE (HCC): ICD-10-CM

## 2017-04-27 DIAGNOSIS — F19.10 DRUG ABUSE (HCC): Primary | ICD-10-CM

## 2017-04-27 DIAGNOSIS — Z3A.32 32 WEEKS GESTATION OF PREGNANCY: ICD-10-CM

## 2017-04-27 PROCEDURE — 76818 FETAL BIOPHYS PROFILE W/NST: CPT | Performed by: SPECIALIST

## 2017-05-04 ENCOUNTER — PROCEDURE VISIT (OUTPATIENT)
Dept: OBGYN CLINIC | Age: 27
End: 2017-05-04
Payer: MEDICARE

## 2017-05-04 DIAGNOSIS — Z3A.32 32 WEEKS GESTATION OF PREGNANCY: ICD-10-CM

## 2017-05-04 DIAGNOSIS — O99.323 METHADONE MAINTENANCE TREATMENT AFFECTING PREGNANCY IN THIRD TRIMESTER (HCC): ICD-10-CM

## 2017-05-04 DIAGNOSIS — F11.20 METHADONE MAINTENANCE TREATMENT AFFECTING PREGNANCY IN THIRD TRIMESTER (HCC): ICD-10-CM

## 2017-05-04 DIAGNOSIS — F19.10 DRUG ABUSE (HCC): ICD-10-CM

## 2017-05-04 DIAGNOSIS — J45.20 MILD INTERMITTENT ASTHMA WITHOUT COMPLICATION: ICD-10-CM

## 2017-05-04 DIAGNOSIS — F13.10 BENZODIAZEPINE ABUSE (HCC): ICD-10-CM

## 2017-05-04 PROCEDURE — 76818 FETAL BIOPHYS PROFILE W/NST: CPT | Performed by: SPECIALIST

## 2017-05-11 ENCOUNTER — HOSPITAL ENCOUNTER (OUTPATIENT)
Age: 27
Discharge: HOME | End: 2017-05-11
Attending: SPECIALIST | Admitting: SPECIALIST
Payer: MEDICARE

## 2017-05-11 ENCOUNTER — PROCEDURE VISIT (OUTPATIENT)
Dept: OBGYN CLINIC | Age: 27
End: 2017-05-11
Payer: MEDICARE

## 2017-05-11 VITALS
TEMPERATURE: 97.7 F | RESPIRATION RATE: 16 BRPM | SYSTOLIC BLOOD PRESSURE: 98 MMHG | DIASTOLIC BLOOD PRESSURE: 54 MMHG | HEART RATE: 81 BPM

## 2017-05-11 DIAGNOSIS — J45.20 MILD INTERMITTENT ASTHMA WITHOUT COMPLICATION: ICD-10-CM

## 2017-05-11 DIAGNOSIS — Z3A.32 32 WEEKS GESTATION OF PREGNANCY: ICD-10-CM

## 2017-05-11 DIAGNOSIS — F13.10 BENZODIAZEPINE ABUSE (HCC): ICD-10-CM

## 2017-05-11 DIAGNOSIS — F19.10 DRUG ABUSE (HCC): ICD-10-CM

## 2017-05-11 DIAGNOSIS — F11.20 METHADONE MAINTENANCE TREATMENT AFFECTING PREGNANCY IN THIRD TRIMESTER (HCC): ICD-10-CM

## 2017-05-11 DIAGNOSIS — O99.323 METHADONE MAINTENANCE TREATMENT AFFECTING PREGNANCY IN THIRD TRIMESTER (HCC): ICD-10-CM

## 2017-05-11 PROBLEM — Z3A.30 30 WEEKS GESTATION OF PREGNANCY: Status: RESOLVED | Noted: 2017-04-13 | Resolved: 2017-05-11

## 2017-05-11 PROBLEM — F16.10: Status: ACTIVE | Noted: 2017-05-11

## 2017-05-11 PROCEDURE — 76818 FETAL BIOPHYS PROFILE W/NST: CPT | Performed by: SPECIALIST

## 2017-05-11 PROCEDURE — 99213 OFFICE O/P EST LOW 20 MIN: CPT

## 2017-05-11 PROCEDURE — G0463 HOSPITAL OUTPT CLINIC VISIT: HCPCS

## 2017-05-18 ENCOUNTER — PROCEDURE VISIT (OUTPATIENT)
Dept: OBGYN CLINIC | Age: 27
End: 2017-05-18
Payer: MEDICARE

## 2017-05-18 DIAGNOSIS — O99.323 METHADONE MAINTENANCE TREATMENT AFFECTING PREGNANCY IN THIRD TRIMESTER (HCC): ICD-10-CM

## 2017-05-18 DIAGNOSIS — F19.10 DRUG ABUSE (HCC): ICD-10-CM

## 2017-05-18 DIAGNOSIS — F13.10 BENZODIAZEPINE ABUSE (HCC): ICD-10-CM

## 2017-05-18 DIAGNOSIS — Z3A.32 32 WEEKS GESTATION OF PREGNANCY: ICD-10-CM

## 2017-05-18 DIAGNOSIS — J45.20 MILD INTERMITTENT ASTHMA WITHOUT COMPLICATION: ICD-10-CM

## 2017-05-18 DIAGNOSIS — F11.20 METHADONE MAINTENANCE TREATMENT AFFECTING PREGNANCY IN THIRD TRIMESTER (HCC): ICD-10-CM

## 2017-05-18 PROCEDURE — 76818 FETAL BIOPHYS PROFILE W/NST: CPT | Performed by: SPECIALIST

## 2017-06-02 ENCOUNTER — TELEPHONE (OUTPATIENT)
Dept: OBGYN CLINIC | Age: 27
End: 2017-06-02

## 2017-06-05 ENCOUNTER — HOSPITAL ENCOUNTER (OUTPATIENT)
Age: 27
Setting detail: SPECIMEN
Discharge: HOME | End: 2017-06-05
Payer: MEDICARE

## 2017-06-05 ENCOUNTER — ROUTINE PRENATAL (OUTPATIENT)
Dept: OBGYN CLINIC | Age: 27
End: 2017-06-05
Payer: MEDICARE

## 2017-06-05 VITALS
SYSTOLIC BLOOD PRESSURE: 104 MMHG | WEIGHT: 200 LBS | BODY MASS INDEX: 34.33 KG/M2 | HEART RATE: 74 BPM | DIASTOLIC BLOOD PRESSURE: 54 MMHG

## 2017-06-05 DIAGNOSIS — Z3A.32 32 WEEKS GESTATION OF PREGNANCY: ICD-10-CM

## 2017-06-05 DIAGNOSIS — F19.10 DRUG ABUSE (HCC): ICD-10-CM

## 2017-06-05 DIAGNOSIS — F11.20 METHADONE MAINTENANCE TREATMENT AFFECTING PREGNANCY IN THIRD TRIMESTER (HCC): ICD-10-CM

## 2017-06-05 DIAGNOSIS — O09.93 HRP (HIGH RISK PREGNANCY), THIRD TRIMESTER: Primary | ICD-10-CM

## 2017-06-05 DIAGNOSIS — B18.2 HEPATITIS C VIRUS CARRIER STATE (HCC): ICD-10-CM

## 2017-06-05 DIAGNOSIS — J45.20 MILD INTERMITTENT ASTHMA WITHOUT COMPLICATION: ICD-10-CM

## 2017-06-05 DIAGNOSIS — O99.323 METHADONE MAINTENANCE TREATMENT AFFECTING PREGNANCY IN THIRD TRIMESTER (HCC): ICD-10-CM

## 2017-06-05 PROCEDURE — 99213 OFFICE O/P EST LOW 20 MIN: CPT | Performed by: SPECIALIST

## 2017-06-05 PROCEDURE — 59025 FETAL NON-STRESS TEST: CPT | Performed by: SPECIALIST

## 2017-06-05 RX ORDER — PROMETHAZINE HYDROCHLORIDE 25 MG/1
25 TABLET ORAL EVERY 6 HOURS PRN
Qty: 30 TABLET | Refills: 1 | Status: SHIPPED | OUTPATIENT
Start: 2017-06-05 | End: 2017-06-30 | Stop reason: SDUPTHER

## 2017-06-08 ENCOUNTER — PROCEDURE VISIT (OUTPATIENT)
Dept: OBGYN CLINIC | Age: 27
End: 2017-06-08
Payer: MEDICARE

## 2017-06-08 DIAGNOSIS — O09.93 HRP (HIGH RISK PREGNANCY), THIRD TRIMESTER: Primary | ICD-10-CM

## 2017-06-08 DIAGNOSIS — Z3A.38 38 WEEKS GESTATION OF PREGNANCY: ICD-10-CM

## 2017-06-08 DIAGNOSIS — F11.20 METHADONE MAINTENANCE TREATMENT AFFECTING PREGNANCY IN THIRD TRIMESTER (HCC): ICD-10-CM

## 2017-06-08 DIAGNOSIS — J45.20 MILD INTERMITTENT ASTHMA WITHOUT COMPLICATION: ICD-10-CM

## 2017-06-08 DIAGNOSIS — O99.323 METHADONE MAINTENANCE TREATMENT AFFECTING PREGNANCY IN THIRD TRIMESTER (HCC): ICD-10-CM

## 2017-06-08 LAB
ACCELERATIONS > 10BPM: NORMAL
ACCELERATIONS > 15 BPM: NORMAL
ACOUSTIC STIMULATION: NORMAL
CULTURE: NORMAL
CULTURE: NORMAL
DECELERATIONS: NORMAL
FHR VARIABILITIES: NORMAL
Lab: NORMAL
NST ASSESSMENT: NORMAL
NST BASELINE: NORMAL
NST DURATION: NORMAL MINUTES
NST INDICATIONS: NORMAL
NST LOCATIONS: NORMAL
NST READ BY: NORMAL
NST RETURN: NORMAL
SPECIMEN DESCRIPTION: NORMAL
STATUS: NORMAL
UTERINE ACTIVITY: NORMAL

## 2017-06-08 PROCEDURE — 59025 FETAL NON-STRESS TEST: CPT | Performed by: SPECIALIST

## 2017-06-12 ENCOUNTER — ROUTINE PRENATAL (OUTPATIENT)
Dept: OBGYN CLINIC | Age: 27
End: 2017-06-12
Payer: MEDICARE

## 2017-06-12 VITALS
WEIGHT: 196 LBS | DIASTOLIC BLOOD PRESSURE: 61 MMHG | HEART RATE: 79 BPM | SYSTOLIC BLOOD PRESSURE: 110 MMHG | BODY MASS INDEX: 33.64 KG/M2

## 2017-06-12 DIAGNOSIS — F19.10 DRUG ABUSE (HCC): ICD-10-CM

## 2017-06-12 DIAGNOSIS — B18.2 HEPATITIS C VIRUS CARRIER STATE (HCC): ICD-10-CM

## 2017-06-12 DIAGNOSIS — J45.20 MILD INTERMITTENT ASTHMA WITHOUT COMPLICATION: ICD-10-CM

## 2017-06-12 DIAGNOSIS — O99.323 METHADONE MAINTENANCE TREATMENT AFFECTING PREGNANCY IN THIRD TRIMESTER (HCC): ICD-10-CM

## 2017-06-12 DIAGNOSIS — Z3A.39 39 WEEKS GESTATION OF PREGNANCY: ICD-10-CM

## 2017-06-12 DIAGNOSIS — F11.20 METHADONE MAINTENANCE TREATMENT AFFECTING PREGNANCY IN THIRD TRIMESTER (HCC): ICD-10-CM

## 2017-06-12 DIAGNOSIS — O09.93 HRP (HIGH RISK PREGNANCY), THIRD TRIMESTER: Primary | ICD-10-CM

## 2017-06-12 DIAGNOSIS — Z3A.32 32 WEEKS GESTATION OF PREGNANCY: ICD-10-CM

## 2017-06-12 PROCEDURE — 99213 OFFICE O/P EST LOW 20 MIN: CPT | Performed by: SPECIALIST

## 2017-06-12 PROCEDURE — 59025 FETAL NON-STRESS TEST: CPT | Performed by: SPECIALIST

## 2017-06-14 ENCOUNTER — ANESTHESIA EVENT (OUTPATIENT)
Dept: LABOR AND DELIVERY | Age: 27
DRG: 540 | End: 2017-06-14
Payer: MEDICARE

## 2017-06-14 ENCOUNTER — HOSPITAL ENCOUNTER (INPATIENT)
Age: 27
LOS: 3 days | Discharge: HOME | DRG: 540 | End: 2017-06-17
Attending: SPECIALIST | Admitting: SPECIALIST
Payer: MEDICARE

## 2017-06-14 ENCOUNTER — ANESTHESIA (OUTPATIENT)
Dept: LABOR AND DELIVERY | Age: 27
DRG: 540 | End: 2017-06-14
Payer: MEDICARE

## 2017-06-14 VITALS — DIASTOLIC BLOOD PRESSURE: 63 MMHG | SYSTOLIC BLOOD PRESSURE: 121 MMHG | OXYGEN SATURATION: 98 %

## 2017-06-14 PROBLEM — Z65.3 LOST CUSTODY OF CHILDREN: Status: ACTIVE | Noted: 2017-06-14

## 2017-06-14 PROBLEM — Z98.891 S/P CESAREAN SECTION: Status: ACTIVE | Noted: 2017-06-14

## 2017-06-14 LAB
-: ABNORMAL
ABO/RH: NORMAL
ABSOLUTE EOS #: 0.1 K/UL (ref 0–0.4)
ABSOLUTE LYMPH #: 1.2 K/UL (ref 1–4.8)
ABSOLUTE MONO #: 0.6 K/UL (ref 0.1–1.2)
AMORPHOUS: ABNORMAL
AMPHETAMINE SCREEN URINE: NEGATIVE
ANTIBODY IDENTIFICATION: NORMAL
ANTIBODY SCREEN: POSITIVE
ARM BAND NUMBER: NORMAL
BACTERIA: ABNORMAL
BARBITURATE SCREEN URINE: NEGATIVE
BASOPHILS # BLD: 0 %
BASOPHILS ABSOLUTE: 0 K/UL (ref 0–0.2)
BENZODIAZEPINE SCREEN, URINE: NEGATIVE
BILIRUBIN URINE: ABNORMAL
BUPRENORPHINE URINE: ABNORMAL
CANNABINOID SCREEN URINE: NEGATIVE
CASTS UA: ABNORMAL /LPF (ref 0–2)
COCAINE METABOLITE, URINE: NEGATIVE
COLOR: ABNORMAL
CRYSTALS, UA: ABNORMAL /HPF
DIFFERENTIAL TYPE: ABNORMAL
EOSINOPHILS RELATIVE PERCENT: 1 %
EPITHELIAL CELLS UA: ABNORMAL /HPF (ref 0–5)
EXPIRATION DATE: NORMAL
GLUCOSE URINE: NEGATIVE
HCT VFR BLD CALC: 29.6 % (ref 36–46)
HEMOGLOBIN: 10 G/DL (ref 12–16)
KETONES, URINE: ABNORMAL
LEUKOCYTE ESTERASE, URINE: ABNORMAL
LYMPHOCYTES # BLD: 18 %
MCH RBC QN AUTO: 25.4 PG (ref 26–34)
MCHC RBC AUTO-ENTMCNC: 33.7 G/DL (ref 31–37)
MCV RBC AUTO: 75.2 FL (ref 80–100)
MDMA URINE: ABNORMAL
METHADONE SCREEN, URINE: POSITIVE
METHAMPHETAMINE, URINE: ABNORMAL
MONOCYTES # BLD: 9 %
MUCUS: ABNORMAL
NITRITE, URINE: NEGATIVE
OPIATES, URINE: NEGATIVE
OTHER OBSERVATIONS UA: ABNORMAL
OXYCODONE SCREEN URINE: NEGATIVE
PDW BLD-RTO: 15 % (ref 12.5–15.4)
PH UA: 5.5 (ref 5–8)
PHENCYCLIDINE, URINE: NEGATIVE
PLATELET # BLD: 158 K/UL (ref 140–450)
PLATELET ESTIMATE: ABNORMAL
PMV BLD AUTO: 8.7 FL (ref 6–12)
PROPOXYPHENE, URINE: ABNORMAL
PROTEIN UA: ABNORMAL
RBC # BLD: 3.94 M/UL (ref 4–5.2)
RBC # BLD: ABNORMAL 10*6/UL
RBC UA: ABNORMAL /HPF (ref 0–2)
RENAL EPITHELIAL, UA: ABNORMAL /HPF
SEG NEUTROPHILS: 72 %
SEGMENTED NEUTROPHILS ABSOLUTE COUNT: 4.9 K/UL (ref 1.8–7.7)
SPECIFIC GRAVITY UA: 1.03 (ref 1–1.03)
T. PALLIDUM, IGG: NONREACTIVE
TEST INFORMATION: ABNORMAL
TRICHOMONAS: ABNORMAL
TRICYCLIC ANTIDEPRESSANTS, UR: ABNORMAL
TURBIDITY: ABNORMAL
URINE HGB: NEGATIVE
UROBILINOGEN, URINE: NORMAL
WBC # BLD: 6.9 K/UL (ref 3.5–11)
WBC # BLD: ABNORMAL 10*3/UL
WBC UA: ABNORMAL /HPF (ref 0–5)
YEAST: ABNORMAL

## 2017-06-14 PROCEDURE — 1220000000 HC SEMI PRIVATE OB R&B

## 2017-06-14 PROCEDURE — 80307 DRUG TEST PRSMV CHEM ANLYZR: CPT

## 2017-06-14 PROCEDURE — 6360000002 HC RX W HCPCS: Performed by: STUDENT IN AN ORGANIZED HEALTH CARE EDUCATION/TRAINING PROGRAM

## 2017-06-14 PROCEDURE — 86780 TREPONEMA PALLIDUM: CPT

## 2017-06-14 PROCEDURE — 3700000000 HC ANESTHESIA ATTENDED CARE: Performed by: SPECIALIST

## 2017-06-14 PROCEDURE — 3609079900 HC CESAREAN SECTION: Performed by: SPECIALIST

## 2017-06-14 PROCEDURE — 6360000002 HC RX W HCPCS: Performed by: OBSTETRICS & GYNECOLOGY

## 2017-06-14 PROCEDURE — 81001 URINALYSIS AUTO W/SCOPE: CPT

## 2017-06-14 PROCEDURE — 7100000000 HC PACU RECOVERY - FIRST 15 MIN: Performed by: SPECIALIST

## 2017-06-14 PROCEDURE — 6360000002 HC RX W HCPCS: Performed by: NURSE ANESTHETIST, CERTIFIED REGISTERED

## 2017-06-14 PROCEDURE — 2580000003 HC RX 258: Performed by: OBSTETRICS & GYNECOLOGY

## 2017-06-14 PROCEDURE — 86900 BLOOD TYPING SEROLOGIC ABO: CPT

## 2017-06-14 PROCEDURE — 2500000003 HC RX 250 WO HCPCS: Performed by: OBSTETRICS & GYNECOLOGY

## 2017-06-14 PROCEDURE — 86850 RBC ANTIBODY SCREEN: CPT

## 2017-06-14 PROCEDURE — 88307 TISSUE EXAM BY PATHOLOGIST: CPT

## 2017-06-14 PROCEDURE — 6360000002 HC RX W HCPCS: Performed by: ANESTHESIOLOGY

## 2017-06-14 PROCEDURE — 86901 BLOOD TYPING SEROLOGIC RH(D): CPT

## 2017-06-14 PROCEDURE — 6370000000 HC RX 637 (ALT 250 FOR IP): Performed by: OBSTETRICS & GYNECOLOGY

## 2017-06-14 PROCEDURE — 86870 RBC ANTIBODY IDENTIFICATION: CPT

## 2017-06-14 PROCEDURE — 2500000003 HC RX 250 WO HCPCS: Performed by: NURSE ANESTHETIST, CERTIFIED REGISTERED

## 2017-06-14 PROCEDURE — 3700000001 HC ADD 15 MINUTES (ANESTHESIA): Performed by: SPECIALIST

## 2017-06-14 PROCEDURE — 87086 URINE CULTURE/COLONY COUNT: CPT

## 2017-06-14 PROCEDURE — 7100000001 HC PACU RECOVERY - ADDTL 15 MIN: Performed by: SPECIALIST

## 2017-06-14 PROCEDURE — 94762 N-INVAS EAR/PLS OXIMTRY CONT: CPT

## 2017-06-14 PROCEDURE — 85025 COMPLETE CBC W/AUTO DIFF WBC: CPT

## 2017-06-14 RX ORDER — KETOROLAC TROMETHAMINE 30 MG/ML
30 INJECTION, SOLUTION INTRAMUSCULAR; INTRAVENOUS EVERY 6 HOURS
Status: COMPLETED | OUTPATIENT
Start: 2017-06-14 | End: 2017-06-15

## 2017-06-14 RX ORDER — DIPHENHYDRAMINE HYDROCHLORIDE 50 MG/ML
25 INJECTION INTRAMUSCULAR; INTRAVENOUS EVERY 6 HOURS PRN
Status: DISCONTINUED | OUTPATIENT
Start: 2017-06-14 | End: 2017-06-17 | Stop reason: HOSPADM

## 2017-06-14 RX ORDER — ACETAMINOPHEN 325 MG/1
650 TABLET ORAL EVERY 4 HOURS PRN
Status: DISCONTINUED | OUTPATIENT
Start: 2017-06-14 | End: 2017-06-17 | Stop reason: HOSPADM

## 2017-06-14 RX ORDER — BUPIVACAINE HYDROCHLORIDE 7.5 MG/ML
INJECTION, SOLUTION INTRASPINAL PRN
Status: DISCONTINUED | OUTPATIENT
Start: 2017-06-14 | End: 2017-06-14 | Stop reason: SDUPTHER

## 2017-06-14 RX ORDER — POLYETHYLENE GLYCOL 3350 17 G/17G
17 POWDER, FOR SOLUTION ORAL DAILY PRN
Status: DISCONTINUED | OUTPATIENT
Start: 2017-06-14 | End: 2017-06-17 | Stop reason: HOSPADM

## 2017-06-14 RX ORDER — SODIUM CHLORIDE, SODIUM LACTATE, POTASSIUM CHLORIDE, CALCIUM CHLORIDE 600; 310; 30; 20 MG/100ML; MG/100ML; MG/100ML; MG/100ML
INJECTION, SOLUTION INTRAVENOUS CONTINUOUS
Status: DISCONTINUED | OUTPATIENT
Start: 2017-06-14 | End: 2017-06-14

## 2017-06-14 RX ORDER — OXYCODONE HYDROCHLORIDE AND ACETAMINOPHEN 5; 325 MG/1; MG/1
1 TABLET ORAL EVERY 4 HOURS PRN
Status: DISCONTINUED | OUTPATIENT
Start: 2017-06-15 | End: 2017-06-17 | Stop reason: HOSPADM

## 2017-06-14 RX ORDER — BISACODYL 10 MG
10 SUPPOSITORY, RECTAL RECTAL DAILY PRN
Status: DISCONTINUED | OUTPATIENT
Start: 2017-06-14 | End: 2017-06-17 | Stop reason: HOSPADM

## 2017-06-14 RX ORDER — ONDANSETRON 2 MG/ML
4 INJECTION INTRAMUSCULAR; INTRAVENOUS EVERY 6 HOURS PRN
Status: DISCONTINUED | OUTPATIENT
Start: 2017-06-14 | End: 2017-06-17 | Stop reason: HOSPADM

## 2017-06-14 RX ORDER — SIMETHICONE 80 MG
80 TABLET,CHEWABLE ORAL EVERY 6 HOURS PRN
Status: DISCONTINUED | OUTPATIENT
Start: 2017-06-14 | End: 2017-06-17 | Stop reason: HOSPADM

## 2017-06-14 RX ORDER — SODIUM CHLORIDE, SODIUM LACTATE, POTASSIUM CHLORIDE, CALCIUM CHLORIDE 600; 310; 30; 20 MG/100ML; MG/100ML; MG/100ML; MG/100ML
INJECTION, SOLUTION INTRAVENOUS CONTINUOUS
Status: ACTIVE | OUTPATIENT
Start: 2017-06-14 | End: 2017-06-15

## 2017-06-14 RX ORDER — PROMETHAZINE HYDROCHLORIDE 25 MG/ML
12.5 INJECTION, SOLUTION INTRAMUSCULAR; INTRAVENOUS ONCE
Status: COMPLETED | OUTPATIENT
Start: 2017-06-14 | End: 2017-06-14

## 2017-06-14 RX ORDER — ALBUTEROL SULFATE 90 UG/1
2 AEROSOL, METERED RESPIRATORY (INHALATION) EVERY 6 HOURS PRN
Status: DISCONTINUED | OUTPATIENT
Start: 2017-06-14 | End: 2017-06-17 | Stop reason: HOSPADM

## 2017-06-14 RX ORDER — NALOXONE HYDROCHLORIDE 0.4 MG/ML
0.4 INJECTION, SOLUTION INTRAMUSCULAR; INTRAVENOUS; SUBCUTANEOUS PRN
Status: DISCONTINUED | OUTPATIENT
Start: 2017-06-14 | End: 2017-06-17 | Stop reason: HOSPADM

## 2017-06-14 RX ORDER — MORPHINE SULFATE 1 MG/ML
INJECTION, SOLUTION EPIDURAL; INTRATHECAL; INTRAVENOUS PRN
Status: DISCONTINUED | OUTPATIENT
Start: 2017-06-14 | End: 2017-06-14 | Stop reason: SDUPTHER

## 2017-06-14 RX ORDER — METHADONE HYDROCHLORIDE 5 MG/5ML
88 SOLUTION ORAL DAILY
Status: DISCONTINUED | OUTPATIENT
Start: 2017-06-14 | End: 2017-06-15

## 2017-06-14 RX ORDER — ONDANSETRON 2 MG/ML
INJECTION INTRAMUSCULAR; INTRAVENOUS PRN
Status: DISCONTINUED | OUTPATIENT
Start: 2017-06-14 | End: 2017-06-14 | Stop reason: SDUPTHER

## 2017-06-14 RX ORDER — DOCUSATE SODIUM 100 MG/1
100 CAPSULE, LIQUID FILLED ORAL 2 TIMES DAILY
Status: DISCONTINUED | OUTPATIENT
Start: 2017-06-14 | End: 2017-06-17 | Stop reason: HOSPADM

## 2017-06-14 RX ORDER — LANOLIN 100 %
OINTMENT (GRAM) TOPICAL
Status: DISCONTINUED | OUTPATIENT
Start: 2017-06-14 | End: 2017-06-17 | Stop reason: HOSPADM

## 2017-06-14 RX ORDER — SODIUM CHLORIDE 0.9 % (FLUSH) 0.9 %
10 SYRINGE (ML) INJECTION EVERY 12 HOURS SCHEDULED
Status: DISCONTINUED | OUTPATIENT
Start: 2017-06-14 | End: 2017-06-17 | Stop reason: HOSPADM

## 2017-06-14 RX ORDER — IBUPROFEN 800 MG/1
800 TABLET ORAL EVERY 8 HOURS PRN
Status: DISCONTINUED | OUTPATIENT
Start: 2017-06-15 | End: 2017-06-17 | Stop reason: HOSPADM

## 2017-06-14 RX ORDER — SODIUM CHLORIDE 0.9 % (FLUSH) 0.9 %
10 SYRINGE (ML) INJECTION PRN
Status: DISCONTINUED | OUTPATIENT
Start: 2017-06-14 | End: 2017-06-17 | Stop reason: HOSPADM

## 2017-06-14 RX ORDER — OXYCODONE HYDROCHLORIDE AND ACETAMINOPHEN 5; 325 MG/1; MG/1
2 TABLET ORAL EVERY 4 HOURS PRN
Status: DISCONTINUED | OUTPATIENT
Start: 2017-06-15 | End: 2017-06-17 | Stop reason: HOSPADM

## 2017-06-14 RX ADMIN — Medication 500 ML: at 09:33

## 2017-06-14 RX ADMIN — Medication 88 MG: at 20:27

## 2017-06-14 RX ADMIN — PHENYLEPHRINE HYDROCHLORIDE 100 MCG: 10 INJECTION INTRAMUSCULAR; INTRAVENOUS; SUBCUTANEOUS at 09:08

## 2017-06-14 RX ADMIN — PHENYLEPHRINE HYDROCHLORIDE 100 MCG: 10 INJECTION INTRAMUSCULAR; INTRAVENOUS; SUBCUTANEOUS at 08:37

## 2017-06-14 RX ADMIN — KETOROLAC TROMETHAMINE 30 MG: 30 INJECTION, SOLUTION INTRAMUSCULAR at 10:59

## 2017-06-14 RX ADMIN — PHENYLEPHRINE HYDROCHLORIDE 100 MCG: 10 INJECTION INTRAMUSCULAR; INTRAVENOUS; SUBCUTANEOUS at 08:46

## 2017-06-14 RX ADMIN — SODIUM CHLORIDE, POTASSIUM CHLORIDE, SODIUM LACTATE AND CALCIUM CHLORIDE: 600; 310; 30; 20 INJECTION, SOLUTION INTRAVENOUS at 07:30

## 2017-06-14 RX ADMIN — Medication 900 MG: at 18:51

## 2017-06-14 RX ADMIN — BUPIVACAINE HYDROCHLORIDE IN DEXTROSE 15 MG: 7.5 INJECTION, SOLUTION SUBARACHNOID at 08:35

## 2017-06-14 RX ADMIN — Medication 1 MILLI-UNITS/MIN: at 09:33

## 2017-06-14 RX ADMIN — GENTAMICIN SULFATE 130 MG: 40 INJECTION, SOLUTION INTRAMUSCULAR; INTRAVENOUS at 08:16

## 2017-06-14 RX ADMIN — KETOROLAC TROMETHAMINE 30 MG: 30 INJECTION, SOLUTION INTRAMUSCULAR at 23:09

## 2017-06-14 RX ADMIN — Medication 900 MG: at 08:44

## 2017-06-14 RX ADMIN — PHENYLEPHRINE HYDROCHLORIDE 100 MCG: 10 INJECTION INTRAMUSCULAR; INTRAVENOUS; SUBCUTANEOUS at 09:54

## 2017-06-14 RX ADMIN — PHENYLEPHRINE HYDROCHLORIDE 100 MCG: 10 INJECTION INTRAMUSCULAR; INTRAVENOUS; SUBCUTANEOUS at 08:43

## 2017-06-14 RX ADMIN — MORPHINE SULFATE 0.2 MG: 1 INJECTION, SOLUTION EPIDURAL; INTRATHECAL; INTRAVENOUS at 08:35

## 2017-06-14 RX ADMIN — PROMETHAZINE HYDROCHLORIDE 12.5 MG: 25 INJECTION INTRAMUSCULAR; INTRAVENOUS at 09:00

## 2017-06-14 RX ADMIN — PHENYLEPHRINE HYDROCHLORIDE 100 MCG: 10 INJECTION INTRAMUSCULAR; INTRAVENOUS; SUBCUTANEOUS at 08:40

## 2017-06-14 RX ADMIN — SODIUM CITRATE AND CITRIC ACID MONOHYDRATE 30 ML: 500; 334 SOLUTION ORAL at 08:16

## 2017-06-14 RX ADMIN — ONDANSETRON 4 MG: 2 INJECTION, SOLUTION INTRAMUSCULAR; INTRAVENOUS at 08:29

## 2017-06-14 RX ADMIN — SODIUM CHLORIDE, POTASSIUM CHLORIDE, SODIUM LACTATE AND CALCIUM CHLORIDE: 600; 310; 30; 20 INJECTION, SOLUTION INTRAVENOUS at 09:39

## 2017-06-14 RX ADMIN — Medication 1 MILLI-UNITS/MIN: at 09:01

## 2017-06-14 RX ADMIN — GENTAMICIN SULFATE 130.8 MG: 40 INJECTION, SOLUTION INTRAMUSCULAR; INTRAVENOUS at 20:28

## 2017-06-14 RX ADMIN — KETOROLAC TROMETHAMINE 30 MG: 30 INJECTION, SOLUTION INTRAMUSCULAR at 16:27

## 2017-06-14 RX ADMIN — PHENYLEPHRINE HYDROCHLORIDE 100 MCG: 10 INJECTION INTRAMUSCULAR; INTRAVENOUS; SUBCUTANEOUS at 08:56

## 2017-06-14 RX ADMIN — Medication 500 ML: at 09:02

## 2017-06-14 RX ADMIN — PHENYLEPHRINE HYDROCHLORIDE 100 MCG: 10 INJECTION INTRAMUSCULAR; INTRAVENOUS; SUBCUTANEOUS at 09:13

## 2017-06-14 ASSESSMENT — PAIN SCALES - GENERAL
PAINLEVEL_OUTOF10: 7
PAINLEVEL_OUTOF10: 9
PAINLEVEL_OUTOF10: 8
PAINLEVEL_OUTOF10: 5

## 2017-06-15 LAB
ABSOLUTE EOS #: 0.1 K/UL (ref 0–0.4)
ABSOLUTE LYMPH #: 1.1 K/UL (ref 1–4.8)
ABSOLUTE MONO #: 0.8 K/UL (ref 0.1–1.2)
BASOPHILS # BLD: 0 %
BASOPHILS ABSOLUTE: 0 K/UL (ref 0–0.2)
CULTURE: NO GROWTH
CULTURE: NORMAL
DIFFERENTIAL TYPE: ABNORMAL
EOSINOPHILS RELATIVE PERCENT: 1 %
FETAL SCREEN: NORMAL
HCT VFR BLD CALC: 24.4 % (ref 36–46)
HEMOGLOBIN: 8.2 G/DL (ref 12–16)
LYMPHOCYTES # BLD: 14 %
Lab: NORMAL
MCH RBC QN AUTO: 25.5 PG (ref 26–34)
MCHC RBC AUTO-ENTMCNC: 33.5 G/DL (ref 31–37)
MCV RBC AUTO: 76.3 FL (ref 80–100)
MONOCYTES # BLD: 9 %
PDW BLD-RTO: 15.4 % (ref 12.5–15.4)
PLATELET # BLD: 145 K/UL (ref 140–450)
PLATELET ESTIMATE: ABNORMAL
PMV BLD AUTO: 8.9 FL (ref 6–12)
RBC # BLD: 3.2 M/UL (ref 4–5.2)
RBC # BLD: ABNORMAL 10*6/UL
SEG NEUTROPHILS: 76 %
SEGMENTED NEUTROPHILS ABSOLUTE COUNT: 6.4 K/UL (ref 1.8–7.7)
SPECIMEN DESCRIPTION: NORMAL
STATUS: NORMAL
WBC # BLD: 8.5 K/UL (ref 3.5–11)
WBC # BLD: ABNORMAL 10*3/UL

## 2017-06-15 PROCEDURE — 1220000000 HC SEMI PRIVATE OB R&B

## 2017-06-15 PROCEDURE — 36415 COLL VENOUS BLD VENIPUNCTURE: CPT

## 2017-06-15 PROCEDURE — 2580000003 HC RX 258: Performed by: STUDENT IN AN ORGANIZED HEALTH CARE EDUCATION/TRAINING PROGRAM

## 2017-06-15 PROCEDURE — 2580000003 HC RX 258: Performed by: OBSTETRICS & GYNECOLOGY

## 2017-06-15 PROCEDURE — 85461 HEMOGLOBIN FETAL: CPT

## 2017-06-15 PROCEDURE — 2500000003 HC RX 250 WO HCPCS: Performed by: OBSTETRICS & GYNECOLOGY

## 2017-06-15 PROCEDURE — 6360000002 HC RX W HCPCS: Performed by: OBSTETRICS & GYNECOLOGY

## 2017-06-15 PROCEDURE — 6360000002 HC RX W HCPCS: Performed by: STUDENT IN AN ORGANIZED HEALTH CARE EDUCATION/TRAINING PROGRAM

## 2017-06-15 PROCEDURE — 85025 COMPLETE CBC W/AUTO DIFF WBC: CPT

## 2017-06-15 PROCEDURE — 94762 N-INVAS EAR/PLS OXIMTRY CONT: CPT

## 2017-06-15 PROCEDURE — 6370000000 HC RX 637 (ALT 250 FOR IP): Performed by: STUDENT IN AN ORGANIZED HEALTH CARE EDUCATION/TRAINING PROGRAM

## 2017-06-15 PROCEDURE — S9443 LACTATION CLASS: HCPCS

## 2017-06-15 PROCEDURE — 6370000000 HC RX 637 (ALT 250 FOR IP): Performed by: OBSTETRICS & GYNECOLOGY

## 2017-06-15 RX ORDER — METHADONE HYDROCHLORIDE 5 MG/5ML
77 SOLUTION ORAL DAILY
Status: DISCONTINUED | OUTPATIENT
Start: 2017-06-15 | End: 2017-06-17 | Stop reason: HOSPADM

## 2017-06-15 RX ORDER — IBUPROFEN 800 MG/1
800 TABLET ORAL EVERY 8 HOURS PRN
Qty: 30 TABLET | Refills: 0 | Status: ON HOLD | OUTPATIENT
Start: 2017-06-15 | End: 2018-12-06

## 2017-06-15 RX ORDER — LANOLIN ALCOHOL/MO/W.PET/CERES
325 CREAM (GRAM) TOPICAL
Status: DISCONTINUED | OUTPATIENT
Start: 2017-06-16 | End: 2017-06-17 | Stop reason: HOSPADM

## 2017-06-15 RX ORDER — PSEUDOEPHEDRINE HCL 30 MG
100 TABLET ORAL 2 TIMES DAILY
Qty: 60 CAPSULE | Refills: 0 | Status: SHIPPED | OUTPATIENT
Start: 2017-06-15 | End: 2017-06-30 | Stop reason: ALTCHOICE

## 2017-06-15 RX ADMIN — IBUPROFEN 800 MG: 800 TABLET, FILM COATED ORAL at 12:20

## 2017-06-15 RX ADMIN — OXYCODONE HYDROCHLORIDE AND ACETAMINOPHEN 2 TABLET: 5; 325 TABLET ORAL at 12:20

## 2017-06-15 RX ADMIN — Medication 77 MG: at 18:25

## 2017-06-15 RX ADMIN — Medication 1 TABLET: at 10:55

## 2017-06-15 RX ADMIN — OXYCODONE HYDROCHLORIDE AND ACETAMINOPHEN 2 TABLET: 5; 325 TABLET ORAL at 21:28

## 2017-06-15 RX ADMIN — KETOROLAC TROMETHAMINE 30 MG: 30 INJECTION, SOLUTION INTRAMUSCULAR at 05:09

## 2017-06-15 RX ADMIN — Medication 900 MG: at 03:31

## 2017-06-15 RX ADMIN — Medication 10 ML: at 10:56

## 2017-06-15 RX ADMIN — Medication 10 ML: at 21:35

## 2017-06-15 RX ADMIN — OXYCODONE HYDROCHLORIDE AND ACETAMINOPHEN 2 TABLET: 5; 325 TABLET ORAL at 17:44

## 2017-06-15 RX ADMIN — DOCUSATE SODIUM 100 MG: 100 CAPSULE ORAL at 10:55

## 2017-06-15 RX ADMIN — GENTAMICIN SULFATE 130.8 MG: 40 INJECTION, SOLUTION INTRAMUSCULAR; INTRAVENOUS at 04:08

## 2017-06-15 RX ADMIN — HUMAN RHO(D) IMMUNE GLOBULIN 300 MCG: 300 INJECTION, SOLUTION INTRAMUSCULAR at 20:16

## 2017-06-15 RX ADMIN — IBUPROFEN 800 MG: 800 TABLET, FILM COATED ORAL at 21:28

## 2017-06-15 RX ADMIN — DOCUSATE SODIUM 100 MG: 100 CAPSULE ORAL at 21:28

## 2017-06-15 ASSESSMENT — PAIN SCALES - GENERAL
PAINLEVEL_OUTOF10: 5
PAINLEVEL_OUTOF10: 7
PAINLEVEL_OUTOF10: 7
PAINLEVEL_OUTOF10: 5
PAINLEVEL_OUTOF10: 7

## 2017-06-16 LAB
BLD PROD TYP BPU: NORMAL
DISPENSE STATUS BLOOD BANK: NORMAL
SURGICAL PATHOLOGY REPORT: NORMAL
TRANSFUSION STATUS: NORMAL
UNIT DIVISION: 0
UNIT NUMBER: NORMAL

## 2017-06-16 PROCEDURE — 94762 N-INVAS EAR/PLS OXIMTRY CONT: CPT

## 2017-06-16 PROCEDURE — 6370000000 HC RX 637 (ALT 250 FOR IP): Performed by: STUDENT IN AN ORGANIZED HEALTH CARE EDUCATION/TRAINING PROGRAM

## 2017-06-16 PROCEDURE — 1220000000 HC SEMI PRIVATE OB R&B

## 2017-06-16 PROCEDURE — 6370000000 HC RX 637 (ALT 250 FOR IP): Performed by: OBSTETRICS & GYNECOLOGY

## 2017-06-16 RX ADMIN — OXYCODONE HYDROCHLORIDE AND ACETAMINOPHEN 2 TABLET: 5; 325 TABLET ORAL at 17:48

## 2017-06-16 RX ADMIN — OXYCODONE HYDROCHLORIDE AND ACETAMINOPHEN 2 TABLET: 5; 325 TABLET ORAL at 02:34

## 2017-06-16 RX ADMIN — IBUPROFEN 800 MG: 800 TABLET, FILM COATED ORAL at 17:48

## 2017-06-16 RX ADMIN — Medication 77 MG: at 14:17

## 2017-06-16 RX ADMIN — OXYCODONE HYDROCHLORIDE AND ACETAMINOPHEN 2 TABLET: 5; 325 TABLET ORAL at 22:33

## 2017-06-16 RX ADMIN — Medication 1 TABLET: at 08:38

## 2017-06-16 RX ADMIN — OXYCODONE HYDROCHLORIDE AND ACETAMINOPHEN 2 TABLET: 5; 325 TABLET ORAL at 08:47

## 2017-06-16 RX ADMIN — DOCUSATE SODIUM 100 MG: 100 CAPSULE ORAL at 08:38

## 2017-06-16 RX ADMIN — IBUPROFEN 800 MG: 800 TABLET, FILM COATED ORAL at 08:46

## 2017-06-16 RX ADMIN — FERROUS SULFATE TAB EC 325 MG (65 MG FE EQUIVALENT) 325 MG: 325 (65 FE) TABLET DELAYED RESPONSE at 08:38

## 2017-06-16 RX ADMIN — DOCUSATE SODIUM 100 MG: 100 CAPSULE ORAL at 20:32

## 2017-06-16 ASSESSMENT — PAIN DESCRIPTION - LOCATION
LOCATION: ABDOMEN
LOCATION: ABDOMEN

## 2017-06-16 ASSESSMENT — PAIN DESCRIPTION - DESCRIPTORS
DESCRIPTORS: ACHING;DISCOMFORT
DESCRIPTORS: DISCOMFORT

## 2017-06-16 ASSESSMENT — PAIN SCALES - GENERAL
PAINLEVEL_OUTOF10: 7
PAINLEVEL_OUTOF10: 2
PAINLEVEL_OUTOF10: 7

## 2017-06-16 ASSESSMENT — PAIN DESCRIPTION - PAIN TYPE: TYPE: SURGICAL PAIN

## 2017-06-17 VITALS
TEMPERATURE: 98.6 F | BODY MASS INDEX: 32.78 KG/M2 | HEIGHT: 64 IN | WEIGHT: 192 LBS | SYSTOLIC BLOOD PRESSURE: 109 MMHG | DIASTOLIC BLOOD PRESSURE: 68 MMHG | HEART RATE: 79 BPM | RESPIRATION RATE: 18 BRPM | OXYGEN SATURATION: 100 %

## 2017-06-17 PROCEDURE — 6370000000 HC RX 637 (ALT 250 FOR IP): Performed by: OBSTETRICS & GYNECOLOGY

## 2017-06-17 PROCEDURE — 6370000000 HC RX 637 (ALT 250 FOR IP): Performed by: STUDENT IN AN ORGANIZED HEALTH CARE EDUCATION/TRAINING PROGRAM

## 2017-06-17 PROCEDURE — 94762 N-INVAS EAR/PLS OXIMTRY CONT: CPT

## 2017-06-17 RX ADMIN — Medication 1 TABLET: at 08:37

## 2017-06-17 RX ADMIN — OXYCODONE HYDROCHLORIDE AND ACETAMINOPHEN 2 TABLET: 5; 325 TABLET ORAL at 04:58

## 2017-06-17 RX ADMIN — DOCUSATE SODIUM 100 MG: 100 CAPSULE ORAL at 08:37

## 2017-06-17 RX ADMIN — FERROUS SULFATE TAB EC 325 MG (65 MG FE EQUIVALENT) 325 MG: 325 (65 FE) TABLET DELAYED RESPONSE at 08:37

## 2017-06-17 RX ADMIN — Medication 77 MG: at 08:37

## 2017-06-17 RX ADMIN — IBUPROFEN 800 MG: 800 TABLET, FILM COATED ORAL at 04:59

## 2017-06-17 ASSESSMENT — PAIN SCALES - GENERAL
PAINLEVEL_OUTOF10: 7
PAINLEVEL_OUTOF10: 8

## 2017-06-20 ENCOUNTER — TELEPHONE (OUTPATIENT)
Dept: OBGYN CLINIC | Age: 27
End: 2017-06-20

## 2017-06-23 ENCOUNTER — POSTPARTUM VISIT (OUTPATIENT)
Dept: OBGYN CLINIC | Age: 27
End: 2017-06-23

## 2017-06-23 VITALS
TEMPERATURE: 99.6 F | BODY MASS INDEX: 33.46 KG/M2 | RESPIRATION RATE: 18 BRPM | DIASTOLIC BLOOD PRESSURE: 73 MMHG | HEART RATE: 76 BPM | WEIGHT: 196 LBS | HEIGHT: 64 IN | SYSTOLIC BLOOD PRESSURE: 119 MMHG

## 2017-06-23 DIAGNOSIS — F31.9 BIPOLAR 1 DISORDER (HCC): ICD-10-CM

## 2017-06-23 DIAGNOSIS — O99.323 METHADONE MAINTENANCE TREATMENT AFFECTING PREGNANCY IN THIRD TRIMESTER (HCC): ICD-10-CM

## 2017-06-23 DIAGNOSIS — F11.20 METHADONE MAINTENANCE TREATMENT AFFECTING PREGNANCY IN THIRD TRIMESTER (HCC): ICD-10-CM

## 2017-06-23 DIAGNOSIS — Z48.89 POSTOPERATIVE VISIT: Primary | ICD-10-CM

## 2017-06-23 PROCEDURE — 99024 POSTOP FOLLOW-UP VISIT: CPT | Performed by: SPECIALIST

## 2017-06-23 RX ORDER — IBUPROFEN 800 MG/1
800 TABLET ORAL EVERY 8 HOURS PRN
Qty: 21 TABLET | Refills: 0 | Status: SHIPPED | OUTPATIENT
Start: 2017-06-23 | End: 2017-06-30 | Stop reason: SDUPTHER

## 2017-06-25 ASSESSMENT — ENCOUNTER SYMPTOMS
DIARRHEA: 0
VOMITING: 0
ABDOMINAL DISTENTION: 0
NAUSEA: 0
COUGH: 0
APNEA: 0
CONSTIPATION: 0
EYE PAIN: 0
ABDOMINAL PAIN: 0

## 2017-06-30 ENCOUNTER — POSTPARTUM VISIT (OUTPATIENT)
Dept: OBGYN CLINIC | Age: 27
End: 2017-06-30

## 2017-06-30 ENCOUNTER — HOSPITAL ENCOUNTER (OUTPATIENT)
Age: 27
Setting detail: SPECIMEN
Discharge: HOME | End: 2017-06-30
Payer: MEDICARE

## 2017-06-30 VITALS
WEIGHT: 189 LBS | SYSTOLIC BLOOD PRESSURE: 129 MMHG | BODY MASS INDEX: 32.27 KG/M2 | DIASTOLIC BLOOD PRESSURE: 91 MMHG | HEART RATE: 77 BPM | RESPIRATION RATE: 18 BRPM | HEIGHT: 64 IN | TEMPERATURE: 99.7 F

## 2017-06-30 DIAGNOSIS — R50.9 ELEVATED TEMPERATURE: ICD-10-CM

## 2017-06-30 DIAGNOSIS — Z48.89 POSTOPERATIVE VISIT: ICD-10-CM

## 2017-06-30 DIAGNOSIS — Z48.89 POSTOPERATIVE VISIT: Primary | ICD-10-CM

## 2017-06-30 LAB
ABSOLUTE EOS #: 0.3 K/UL (ref 0–0.4)
ABSOLUTE LYMPH #: 1.8 K/UL (ref 1–4.8)
ABSOLUTE MONO #: 0.7 K/UL (ref 0.1–1.2)
BASOPHILS # BLD: 1 %
BASOPHILS ABSOLUTE: 0 K/UL (ref 0–0.2)
BILIRUBIN, POC: NORMAL
BLOOD URINE, POC: NORMAL
CLARITY, POC: CLEAR
COLOR, POC: YELLOW
DIFFERENTIAL TYPE: ABNORMAL
EOSINOPHILS RELATIVE PERCENT: 3 %
GLUCOSE URINE, POC: NORMAL
HCT VFR BLD CALC: 29.8 % (ref 36–46)
HEMOGLOBIN: 9.7 G/DL (ref 12–16)
KETONES, POC: NORMAL
LEUKOCYTE EST, POC: NORMAL
LYMPHOCYTES # BLD: 17 %
MCH RBC QN AUTO: 25.1 PG (ref 26–34)
MCHC RBC AUTO-ENTMCNC: 32.6 G/DL (ref 31–37)
MCV RBC AUTO: 76.9 FL (ref 80–100)
MONOCYTES # BLD: 7 %
NITRITE, POC: NORMAL
PDW BLD-RTO: 15.8 % (ref 12.5–15.4)
PH, POC: NORMAL
PLATELET # BLD: 301 K/UL (ref 140–450)
PLATELET ESTIMATE: ABNORMAL
PMV BLD AUTO: 8.4 FL (ref 6–12)
PROTEIN, POC: NORMAL
RBC # BLD: 3.87 M/UL (ref 4–5.2)
RBC # BLD: ABNORMAL 10*6/UL
SEG NEUTROPHILS: 72 %
SEGMENTED NEUTROPHILS ABSOLUTE COUNT: 7.7 K/UL (ref 1.8–7.7)
SPECIFIC GRAVITY, POC: NORMAL
UROBILINOGEN, POC: NORMAL
WBC # BLD: 10.6 K/UL (ref 3.5–11)
WBC # BLD: ABNORMAL 10*3/UL

## 2017-06-30 PROCEDURE — 99024 POSTOP FOLLOW-UP VISIT: CPT | Performed by: SPECIALIST

## 2017-06-30 ASSESSMENT — ENCOUNTER SYMPTOMS
NAUSEA: 0
APNEA: 0
EYE PAIN: 0
ABDOMINAL DISTENTION: 0
COUGH: 0
CONSTIPATION: 0
ABDOMINAL PAIN: 1
DIARRHEA: 0
VOMITING: 0

## 2017-07-31 RX ORDER — PROMETHAZINE HYDROCHLORIDE 25 MG/1
25 TABLET ORAL EVERY 6 HOURS PRN
Qty: 30 TABLET | Refills: 1 | Status: ON HOLD | OUTPATIENT
Start: 2017-07-31 | End: 2018-05-17 | Stop reason: HOSPADM

## 2017-10-17 ENCOUNTER — TELEPHONE (OUTPATIENT)
Dept: OBGYN CLINIC | Age: 27
End: 2017-10-17

## 2017-11-22 ENCOUNTER — OFFICE VISIT (OUTPATIENT)
Dept: OBGYN CLINIC | Age: 27
End: 2017-11-22
Payer: MEDICARE

## 2017-11-22 VITALS
HEIGHT: 64 IN | OXYGEN SATURATION: 99 % | TEMPERATURE: 98.3 F | RESPIRATION RATE: 18 BRPM | SYSTOLIC BLOOD PRESSURE: 106 MMHG | DIASTOLIC BLOOD PRESSURE: 67 MMHG | HEART RATE: 68 BPM | WEIGHT: 184 LBS | BODY MASS INDEX: 31.41 KG/M2

## 2017-11-22 DIAGNOSIS — Z30.013 INITIATION OF DEPO PROVERA: ICD-10-CM

## 2017-11-22 DIAGNOSIS — R35.0 URINARY FREQUENCY: ICD-10-CM

## 2017-11-22 DIAGNOSIS — Z30.09 BIRTH CONTROL COUNSELING: Primary | ICD-10-CM

## 2017-11-22 DIAGNOSIS — R39.11 URINARY HESITANCY: ICD-10-CM

## 2017-11-22 LAB
BILIRUBIN, POC: NORMAL
BLOOD URINE, POC: NORMAL
CLARITY, POC: CLEAR
COLOR, POC: NORMAL
CONTROL: NORMAL
GLUCOSE URINE, POC: NORMAL
KETONES, POC: NORMAL
LEUKOCYTE EST, POC: NORMAL
NITRITE, POC: NORMAL
PH, POC: 6
PREGNANCY TEST URINE, POC: NORMAL
PROTEIN, POC: NORMAL
SPECIFIC GRAVITY, POC: 1.03
UROBILINOGEN, POC: NORMAL

## 2017-11-22 PROCEDURE — 1036F TOBACCO NON-USER: CPT | Performed by: CLINICAL NURSE SPECIALIST

## 2017-11-22 PROCEDURE — G8427 DOCREV CUR MEDS BY ELIG CLIN: HCPCS | Performed by: CLINICAL NURSE SPECIALIST

## 2017-11-22 PROCEDURE — G8484 FLU IMMUNIZE NO ADMIN: HCPCS | Performed by: CLINICAL NURSE SPECIALIST

## 2017-11-22 PROCEDURE — 81025 URINE PREGNANCY TEST: CPT | Performed by: CLINICAL NURSE SPECIALIST

## 2017-11-22 PROCEDURE — 99213 OFFICE O/P EST LOW 20 MIN: CPT | Performed by: CLINICAL NURSE SPECIALIST

## 2017-11-22 PROCEDURE — G8417 CALC BMI ABV UP PARAM F/U: HCPCS | Performed by: CLINICAL NURSE SPECIALIST

## 2017-11-22 RX ORDER — MEDROXYPROGESTERONE ACETATE 150 MG/ML
150 INJECTION, SUSPENSION INTRAMUSCULAR ONCE
Qty: 1 EACH | Refills: 2 | Status: SHIPPED | OUTPATIENT
Start: 2017-11-22 | End: 2018-02-28 | Stop reason: SDUPTHER

## 2017-11-22 ASSESSMENT — ENCOUNTER SYMPTOMS
ALLERGIC/IMMUNOLOGIC NEGATIVE: 1
GASTROINTESTINAL NEGATIVE: 1
EYES NEGATIVE: 1
RESPIRATORY NEGATIVE: 1

## 2017-11-22 NOTE — PROGRESS NOTES
Male     Other Topics Concern    None     Social History Narrative    None       REVIEW OF SYSTEMS:  Review of Systems   Constitutional: Negative for chills and fever. HENT: Negative. Eyes: Negative. Respiratory: Negative. Cardiovascular: Negative. Gastrointestinal: Negative. Endocrine: Negative. Genitourinary: Positive for frequency (for the past month). Negative for menstrual problem. Hesitancy for 1 month   Musculoskeletal: Negative. Skin: Negative. Allergic/Immunologic: Negative. Neurological: Negative. Hematological: Negative. Psychiatric/Behavioral: Negative. PHYSICAL EXAM:  Physical Exam   Constitutional: She is oriented to person, place, and time. She appears well-developed and well-nourished. HENT:   Head: Normocephalic and atraumatic. Eyes: Conjunctivae are normal.   Neck: Normal range of motion. Neck supple. Cardiovascular: Normal rate and regular rhythm. Pulmonary/Chest: Effort normal and breath sounds normal.   Abdominal: Bowel sounds are normal.   Musculoskeletal: Normal range of motion. Neurological: She is oriented to person, place, and time. Skin: Skin is warm and dry. Psychiatric: She has a normal mood and affect. Her behavior is normal. Judgment and thought content normal.   Vitals reviewed. ASSESSMENT:  The patient was counseled on all forms of birth control and patient states she would like depo. 1. Birth control counseling    2. Urinary hesitancy    3. Urinary frequency    4. Initiation of Depo Provera        PLAN:  Orders Placed This Encounter   Procedures    POCT urine pregnancy    POCT Urinalysis no Micro     Orders Placed This Encounter   Medications    medroxyPROGESTERone (DEPO-PROVERA) 150 MG/ML injection     Sig: Inject 1 mL into the muscle once for 1 dose     Dispense:  1 each     Refill:  2     Return in about 3 months (around 2/22/2018). Patient was seen with total face to face time of 15 minutes.  More

## 2017-12-04 ENCOUNTER — NURSE ONLY (OUTPATIENT)
Dept: OBGYN CLINIC | Age: 27
End: 2017-12-04
Payer: MEDICARE

## 2017-12-04 DIAGNOSIS — Z30.42 ENCOUNTER FOR DEPO-PROVERA CONTRACEPTION: Primary | ICD-10-CM

## 2017-12-04 PROCEDURE — 81025 URINE PREGNANCY TEST: CPT | Performed by: SPECIALIST

## 2017-12-04 PROCEDURE — 96372 THER/PROPH/DIAG INJ SC/IM: CPT | Performed by: SPECIALIST

## 2017-12-04 RX ORDER — MEDROXYPROGESTERONE ACETATE 150 MG/ML
150 INJECTION, SUSPENSION INTRAMUSCULAR ONCE
Status: COMPLETED | OUTPATIENT
Start: 2017-12-04 | End: 2017-12-04

## 2017-12-04 RX ADMIN — MEDROXYPROGESTERONE ACETATE 150 MG: 150 INJECTION, SUSPENSION INTRAMUSCULAR at 14:29

## 2018-02-02 ENCOUNTER — HOSPITAL ENCOUNTER (EMERGENCY)
Age: 28
Discharge: HOME | End: 2018-02-02
Attending: EMERGENCY MEDICINE
Payer: MEDICARE

## 2018-02-02 VITALS
RESPIRATION RATE: 18 BRPM | SYSTOLIC BLOOD PRESSURE: 125 MMHG | WEIGHT: 172 LBS | HEART RATE: 93 BPM | HEIGHT: 64 IN | BODY MASS INDEX: 29.37 KG/M2 | OXYGEN SATURATION: 99 % | TEMPERATURE: 98.4 F | DIASTOLIC BLOOD PRESSURE: 81 MMHG

## 2018-02-02 DIAGNOSIS — F13.930 BENZODIAZEPINE WITHDRAWAL WITHOUT COMPLICATION (HCC): Primary | ICD-10-CM

## 2018-02-02 LAB
ABSOLUTE EOS #: 0.07 K/UL (ref 0–0.44)
ABSOLUTE IMMATURE GRANULOCYTE: <0.03 K/UL (ref 0–0.3)
ABSOLUTE LYMPH #: 1.55 K/UL (ref 1.1–3.7)
ABSOLUTE MONO #: 0.41 K/UL (ref 0.1–1.2)
ANION GAP SERPL CALCULATED.3IONS-SCNC: 14 MMOL/L (ref 9–17)
BASOPHILS # BLD: 0 % (ref 0–2)
BASOPHILS ABSOLUTE: <0.03 K/UL (ref 0–0.2)
BUN BLDV-MCNC: 10 MG/DL (ref 6–20)
BUN/CREAT BLD: NORMAL (ref 9–20)
CALCIUM SERPL-MCNC: 9.5 MG/DL (ref 8.6–10.4)
CHLORIDE BLD-SCNC: 101 MMOL/L (ref 98–107)
CO2: 26 MMOL/L (ref 20–31)
CREAT SERPL-MCNC: 0.78 MG/DL (ref 0.5–0.9)
DIFFERENTIAL TYPE: ABNORMAL
EKG ATRIAL RATE: 84 BPM
EKG P AXIS: 79 DEGREES
EKG P-R INTERVAL: 152 MS
EKG Q-T INTERVAL: 364 MS
EKG QRS DURATION: 76 MS
EKG QTC CALCULATION (BAZETT): 430 MS
EKG R AXIS: 55 DEGREES
EKG T AXIS: 43 DEGREES
EKG VENTRICULAR RATE: 84 BPM
EOSINOPHILS RELATIVE PERCENT: 1 % (ref 1–4)
GFR AFRICAN AMERICAN: >60 ML/MIN
GFR NON-AFRICAN AMERICAN: >60 ML/MIN
GFR SERPL CREATININE-BSD FRML MDRD: NORMAL ML/MIN/{1.73_M2}
GFR SERPL CREATININE-BSD FRML MDRD: NORMAL ML/MIN/{1.73_M2}
GLUCOSE BLD-MCNC: 80 MG/DL (ref 70–99)
HCT VFR BLD CALC: 39.2 % (ref 36.3–47.1)
HEMOGLOBIN: 13.9 G/DL (ref 11.9–15.1)
IMMATURE GRANULOCYTES: 0 %
LYMPHOCYTES # BLD: 26 % (ref 24–43)
MCH RBC QN AUTO: 29.1 PG (ref 25.2–33.5)
MCHC RBC AUTO-ENTMCNC: 35.5 G/DL (ref 28.4–34.8)
MCV RBC AUTO: 82.2 FL (ref 82.6–102.9)
MONOCYTES # BLD: 7 % (ref 3–12)
NRBC AUTOMATED: 0 PER 100 WBC
PDW BLD-RTO: 13.2 % (ref 11.8–14.4)
PLATELET # BLD: 148 K/UL (ref 138–453)
PLATELET ESTIMATE: ABNORMAL
PMV BLD AUTO: 11.4 FL (ref 8.1–13.5)
POTASSIUM SERPL-SCNC: 4 MMOL/L (ref 3.7–5.3)
RBC # BLD: 4.77 M/UL (ref 3.95–5.11)
RBC # BLD: ABNORMAL 10*6/UL
SEG NEUTROPHILS: 66 % (ref 36–65)
SEGMENTED NEUTROPHILS ABSOLUTE COUNT: 3.93 K/UL (ref 1.5–8.1)
SODIUM BLD-SCNC: 141 MMOL/L (ref 135–144)
WBC # BLD: 6 K/UL (ref 3.5–11.3)
WBC # BLD: ABNORMAL 10*3/UL

## 2018-02-02 PROCEDURE — 96361 HYDRATE IV INFUSION ADD-ON: CPT

## 2018-02-02 PROCEDURE — 2580000003 HC RX 258: Performed by: EMERGENCY MEDICINE

## 2018-02-02 PROCEDURE — 99284 EMERGENCY DEPT VISIT MOD MDM: CPT

## 2018-02-02 PROCEDURE — 85025 COMPLETE CBC W/AUTO DIFF WBC: CPT

## 2018-02-02 PROCEDURE — 80048 BASIC METABOLIC PNL TOTAL CA: CPT

## 2018-02-02 PROCEDURE — 93005 ELECTROCARDIOGRAM TRACING: CPT

## 2018-02-02 PROCEDURE — 96360 HYDRATION IV INFUSION INIT: CPT

## 2018-02-02 RX ORDER — DIAZEPAM 10 MG/1
30 TABLET ORAL EVERY 6 HOURS PRN
Status: ON HOLD | COMMUNITY
End: 2018-05-17 | Stop reason: HOSPADM

## 2018-02-02 RX ORDER — GABAPENTIN 100 MG/1
100 CAPSULE ORAL 3 TIMES DAILY
Status: ON HOLD | COMMUNITY
End: 2018-12-06

## 2018-02-02 RX ORDER — 0.9 % SODIUM CHLORIDE 0.9 %
1000 INTRAVENOUS SOLUTION INTRAVENOUS ONCE
Status: COMPLETED | OUTPATIENT
Start: 2018-02-02 | End: 2018-02-02

## 2018-02-02 RX ADMIN — SODIUM CHLORIDE 1000 ML: 9 INJECTION, SOLUTION INTRAVENOUS at 10:13

## 2018-02-02 ASSESSMENT — ENCOUNTER SYMPTOMS
SHORTNESS OF BREATH: 0
VOMITING: 0
ABDOMINAL PAIN: 0
NAUSEA: 0

## 2018-02-02 NOTE — ED NOTES
Patient resting on stretcher. Pt updated on plan of care and duration.  Will continue to monitor      Abram Mann RN  02/02/18 [id number]

## 2018-02-02 NOTE — ED PROVIDER NOTES
Occupational History    Not on file. Social History Main Topics    Smoking status: Never Smoker    Smokeless tobacco: Never Used    Alcohol use No      Comment: prior abuse    Drug use: Yes     Types: Cocaine      Comment: methadone,  cocaine , xanax, [place] 3 weeks ago    Sexual activity: Not Currently     Partners: Male     Other Topics Concern    Not on file     Social History Narrative    No narrative on file       No family history on file. Allergies:  Penicillins    Home Medications:  Prior to Admission medications    Medication Sig Start Date End Date Taking? Authorizing Provider   diazepam (VALIUM) 10 MG tablet Take 30 mg by mouth every 6 hours as needed for Anxiety. Yes Historical Provider, MD   gabapentin (NEURONTIN) 100 MG capsule Take 100 mg by mouth 3 times daily. Yes Historical Provider, MD   medroxyPROGESTERone (DEPO-PROVERA) 150 MG/ML injection Inject 1 mL into the muscle once for 1 dose 11/22/17 11/22/17  Juan José Pérez CNP   promethazine (PHENERGAN) 25 MG tablet Take 1 tablet by mouth every 6 hours as needed for Nausea 7/31/17   Megha Puente MD   ibuprofen (ADVIL;MOTRIN) 800 MG tablet Take 1 tablet by mouth every 8 hours as needed for Pain 6/15/17   Matt Morejon DO   hydrocortisone (ANUSOL-HC) 2.5 % rectal cream Place rectally 2 times daily. 6/5/17   Megha Puente MD   methadone (DOLOPHINE) 10 MG tablet   Take 88 mg by mouth daily     Historical Provider, MD       REVIEW OF SYSTEMS    (2-9 systems for level 4, 10 or more for level 5)      Review of Systems   Constitutional: Negative for fever. Respiratory: Negative for shortness of breath. Cardiovascular: Negative for chest pain. Gastrointestinal: Negative for abdominal pain, nausea and vomiting. Genitourinary: Negative for dysuria. Neurological: Positive for dizziness. Negative for seizures, weakness and numbness.      PHYSICAL EXAM   (up to 7 for level 4, 8 or more for level 5)      INITIAL VITALS:   BP

## 2018-02-16 ENCOUNTER — HOSPITAL ENCOUNTER (OUTPATIENT)
Age: 28
Setting detail: SPECIMEN
Discharge: HOME | End: 2018-02-16
Payer: MEDICARE

## 2018-02-16 LAB
ALBUMIN SERPL-MCNC: 4.6 G/DL (ref 3.5–5.2)
ALBUMIN/GLOBULIN RATIO: 1.5 (ref 1–2.5)
ALP BLD-CCNC: 65 U/L (ref 35–104)
ALT SERPL-CCNC: 17 U/L (ref 5–33)
ANION GAP SERPL CALCULATED.3IONS-SCNC: 16 MMOL/L (ref 9–17)
AST SERPL-CCNC: 20 U/L
BILIRUB SERPL-MCNC: 0.55 MG/DL (ref 0.3–1.2)
BUN BLDV-MCNC: 13 MG/DL (ref 6–20)
BUN/CREAT BLD: NORMAL (ref 9–20)
CALCIUM SERPL-MCNC: 9.4 MG/DL (ref 8.6–10.4)
CHLORIDE BLD-SCNC: 101 MMOL/L (ref 98–107)
CO2: 25 MMOL/L (ref 20–31)
CREAT SERPL-MCNC: 0.8 MG/DL (ref 0.5–0.9)
GFR AFRICAN AMERICAN: >60 ML/MIN
GFR NON-AFRICAN AMERICAN: >60 ML/MIN
GFR SERPL CREATININE-BSD FRML MDRD: NORMAL ML/MIN/{1.73_M2}
GFR SERPL CREATININE-BSD FRML MDRD: NORMAL ML/MIN/{1.73_M2}
GLUCOSE BLD-MCNC: 81 MG/DL (ref 70–99)
HCT VFR BLD CALC: 40.8 % (ref 36.3–47.1)
HEMOGLOBIN: 13.7 G/DL (ref 11.9–15.1)
HEPATITIS B SURFACE ANTIGEN: NONREACTIVE
HEPATITIS C ANTIBODY: REACTIVE
HIV AG/AB: NONREACTIVE
MCH RBC QN AUTO: 28.6 PG (ref 25.2–33.5)
MCHC RBC AUTO-ENTMCNC: 33.6 G/DL (ref 28.4–34.8)
MCV RBC AUTO: 85.2 FL (ref 82.6–102.9)
NRBC AUTOMATED: 0 PER 100 WBC
PDW BLD-RTO: 13.6 % (ref 11.8–14.4)
PLATELET # BLD: 174 K/UL (ref 138–453)
PMV BLD AUTO: 11.4 FL (ref 8.1–13.5)
POTASSIUM SERPL-SCNC: 4 MMOL/L (ref 3.7–5.3)
RBC # BLD: 4.79 M/UL (ref 3.95–5.11)
SODIUM BLD-SCNC: 142 MMOL/L (ref 135–144)
T. PALLIDUM, IGG: NONREACTIVE
TOTAL PROTEIN: 7.6 G/DL (ref 6.4–8.3)
WBC # BLD: 5.6 K/UL (ref 3.5–11.3)

## 2018-02-28 ENCOUNTER — HOSPITAL ENCOUNTER (OUTPATIENT)
Age: 28
Setting detail: SPECIMEN
Discharge: HOME | End: 2018-02-28
Payer: MEDICARE

## 2018-02-28 ENCOUNTER — OFFICE VISIT (OUTPATIENT)
Dept: OBGYN CLINIC | Age: 28
End: 2018-02-28
Payer: MEDICARE

## 2018-02-28 VITALS
SYSTOLIC BLOOD PRESSURE: 100 MMHG | WEIGHT: 176 LBS | HEART RATE: 71 BPM | DIASTOLIC BLOOD PRESSURE: 63 MMHG | BODY MASS INDEX: 30.21 KG/M2 | RESPIRATION RATE: 18 BRPM

## 2018-02-28 DIAGNOSIS — N76.0 ACUTE VAGINITIS: ICD-10-CM

## 2018-02-28 DIAGNOSIS — Z72.51 HIGH RISK SEXUAL BEHAVIOR: ICD-10-CM

## 2018-02-28 DIAGNOSIS — Z12.4 PAP SMEAR FOR CERVICAL CANCER SCREENING: ICD-10-CM

## 2018-02-28 DIAGNOSIS — Z30.09 BIRTH CONTROL COUNSELING: ICD-10-CM

## 2018-02-28 DIAGNOSIS — Z30.013 INITIATION OF DEPO PROVERA: ICD-10-CM

## 2018-02-28 DIAGNOSIS — Z01.419 WELL WOMAN EXAM: Primary | ICD-10-CM

## 2018-02-28 LAB
DIRECT EXAM: ABNORMAL
Lab: ABNORMAL
SPECIMEN DESCRIPTION: ABNORMAL
STATUS: ABNORMAL

## 2018-02-28 PROCEDURE — G8427 DOCREV CUR MEDS BY ELIG CLIN: HCPCS | Performed by: SPECIALIST

## 2018-02-28 PROCEDURE — G8417 CALC BMI ABV UP PARAM F/U: HCPCS | Performed by: SPECIALIST

## 2018-02-28 PROCEDURE — 99395 PREV VISIT EST AGE 18-39: CPT | Performed by: SPECIALIST

## 2018-02-28 PROCEDURE — G8484 FLU IMMUNIZE NO ADMIN: HCPCS | Performed by: SPECIALIST

## 2018-02-28 PROCEDURE — 1036F TOBACCO NON-USER: CPT | Performed by: SPECIALIST

## 2018-02-28 RX ORDER — FLUCONAZOLE 100 MG/1
100 TABLET ORAL DAILY
Qty: 7 TABLET | Refills: 0 | Status: SHIPPED | OUTPATIENT
Start: 2018-02-28 | End: 2018-03-07

## 2018-02-28 RX ORDER — METRONIDAZOLE 500 MG/1
500 TABLET ORAL 2 TIMES DAILY
Qty: 14 TABLET | Refills: 0 | Status: SHIPPED | OUTPATIENT
Start: 2018-02-28 | End: 2018-03-07

## 2018-02-28 RX ORDER — MEDROXYPROGESTERONE ACETATE 150 MG/ML
150 INJECTION, SUSPENSION INTRAMUSCULAR ONCE
Qty: 1 EACH | Refills: 3 | Status: ON HOLD | OUTPATIENT
Start: 2018-02-28 | End: 2018-05-17 | Stop reason: HOSPADM

## 2018-02-28 ASSESSMENT — ENCOUNTER SYMPTOMS
COUGH: 0
CONSTIPATION: 0
ABDOMINAL DISTENTION: 0
DIARRHEA: 0
NAUSEA: 0
EYE PAIN: 0
APNEA: 0
VOMITING: 0
ABDOMINAL PAIN: 0

## 2018-02-28 NOTE — PROGRESS NOTES
for her annual exam.  She has been using Depo Provera for birth control and wishes to continue this at this time. She denies nausea, vomiting and fever. Review of Systems   Constitutional: Negative for activity change, appetite change and fever. HENT: Negative for ear discharge and ear pain. Eyes: Negative for pain and visual disturbance. Respiratory: Negative for apnea and cough. Cardiovascular: Negative for chest pain, palpitations and leg swelling. Gastrointestinal: Negative for abdominal distention, abdominal pain, constipation, diarrhea, nausea and vomiting. Endocrine: Negative. Genitourinary: Negative for difficulty urinating, dysuria, menstrual problem and pelvic pain. Musculoskeletal: Negative for neck pain and neck stiffness. Skin: Negative. Neurological: Negative for light-headedness and numbness. Hematological: Negative. Does not bruise/bleed easily. Objective:   Physical Exam   Constitutional: She is oriented to person, place, and time. She appears well-developed and well-nourished. HENT:   Head: Normocephalic and atraumatic. Neck: Normal range of motion. Neck supple. No thyromegaly present. Cardiovascular: Normal rate and regular rhythm. Pulmonary/Chest: Effort normal and breath sounds normal. She has no wheezes. Abdominal: Soft. Bowel sounds are normal. She exhibits no distension and no mass. There is no tenderness. There is no guarding. Hernia confirmed negative in the right inguinal area and confirmed negative in the left inguinal area. Genitourinary: Rectum normal and uterus normal. No breast swelling, tenderness, discharge or bleeding. There is no rash or tenderness on the right labia. There is no rash or tenderness on the left labia. Cervix exhibits discharge. Cervix exhibits no motion tenderness and no friability. Right adnexum displays no mass, no tenderness and no fullness. Left adnexum displays no mass, no tenderness and no fullness.

## 2018-03-01 LAB
C TRACH DNA GENITAL QL NAA+PROBE: NEGATIVE
N. GONORRHOEAE DNA: NEGATIVE

## 2018-03-06 ENCOUNTER — NURSE ONLY (OUTPATIENT)
Dept: OBGYN CLINIC | Age: 28
End: 2018-03-06
Payer: MEDICARE

## 2018-03-06 DIAGNOSIS — Z30.42 ENCOUNTER FOR DEPO-PROVERA CONTRACEPTION: Primary | ICD-10-CM

## 2018-03-06 PROCEDURE — 99211 OFF/OP EST MAY X REQ PHY/QHP: CPT | Performed by: SPECIALIST

## 2018-03-06 PROCEDURE — 81025 URINE PREGNANCY TEST: CPT | Performed by: SPECIALIST

## 2018-03-06 RX ORDER — MEDROXYPROGESTERONE ACETATE 150 MG/ML
150 INJECTION, SUSPENSION INTRAMUSCULAR ONCE
Status: COMPLETED | OUTPATIENT
Start: 2018-03-06 | End: 2018-03-06

## 2018-03-06 RX ADMIN — MEDROXYPROGESTERONE ACETATE 150 MG: 150 INJECTION, SUSPENSION INTRAMUSCULAR at 11:15

## 2018-03-12 LAB — CYTOLOGY REPORT: NORMAL

## 2018-05-10 ENCOUNTER — HOSPITAL ENCOUNTER (EMERGENCY)
Age: 28
Discharge: HOME | End: 2018-05-10
Attending: EMERGENCY MEDICINE
Payer: MEDICARE

## 2018-05-10 VITALS
DIASTOLIC BLOOD PRESSURE: 86 MMHG | SYSTOLIC BLOOD PRESSURE: 129 MMHG | RESPIRATION RATE: 18 BRPM | TEMPERATURE: 97.8 F | HEART RATE: 86 BPM | OXYGEN SATURATION: 98 %

## 2018-05-10 DIAGNOSIS — T50.901A ACCIDENTAL DRUG OVERDOSE, INITIAL ENCOUNTER: Primary | ICD-10-CM

## 2018-05-10 LAB
-: NORMAL
ABSOLUTE EOS #: 0.1 K/UL (ref 0–0.44)
ABSOLUTE IMMATURE GRANULOCYTE: <0.03 K/UL (ref 0–0.3)
ABSOLUTE LYMPH #: 1.6 K/UL (ref 1.1–3.7)
ABSOLUTE MONO #: 0.42 K/UL (ref 0.1–1.2)
ACETAMINOPHEN LEVEL: <5 UG/ML (ref 10–30)
ALBUMIN SERPL-MCNC: 3.9 G/DL (ref 3.5–5.2)
ALBUMIN/GLOBULIN RATIO: 1.3 (ref 1–2.5)
ALP BLD-CCNC: 58 U/L (ref 35–104)
ALT SERPL-CCNC: 10 U/L (ref 5–33)
AMORPHOUS: NORMAL
AMPHETAMINE SCREEN URINE: NEGATIVE
ANION GAP SERPL CALCULATED.3IONS-SCNC: 11 MMOL/L (ref 9–17)
AST SERPL-CCNC: 12 U/L
BACTERIA: NORMAL
BARBITURATE SCREEN URINE: NEGATIVE
BASOPHILS # BLD: 0 % (ref 0–2)
BASOPHILS ABSOLUTE: <0.03 K/UL (ref 0–0.2)
BENZODIAZEPINE SCREEN, URINE: NEGATIVE
BILIRUB SERPL-MCNC: 0.57 MG/DL (ref 0.3–1.2)
BILIRUBIN URINE: NEGATIVE
BUN BLDV-MCNC: 7 MG/DL (ref 6–20)
BUN/CREAT BLD: ABNORMAL (ref 9–20)
BUPRENORPHINE URINE: ABNORMAL
CALCIUM SERPL-MCNC: 9.1 MG/DL (ref 8.6–10.4)
CANNABINOID SCREEN URINE: NEGATIVE
CASTS UA: NORMAL /LPF (ref 0–8)
CHLORIDE BLD-SCNC: 108 MMOL/L (ref 98–107)
CO2: 23 MMOL/L (ref 20–31)
COCAINE METABOLITE, URINE: POSITIVE
COLOR: YELLOW
COMMENT UA: ABNORMAL
CREAT SERPL-MCNC: 0.7 MG/DL (ref 0.5–0.9)
CRYSTALS, UA: NORMAL /HPF
DIFFERENTIAL TYPE: ABNORMAL
EKG ATRIAL RATE: 64 BPM
EKG P AXIS: 16 DEGREES
EKG P-R INTERVAL: 118 MS
EKG Q-T INTERVAL: 374 MS
EKG QRS DURATION: 74 MS
EKG QTC CALCULATION (BAZETT): 385 MS
EKG R AXIS: 33 DEGREES
EKG T AXIS: 31 DEGREES
EKG VENTRICULAR RATE: 64 BPM
EOSINOPHILS RELATIVE PERCENT: 2 % (ref 1–4)
EPITHELIAL CELLS UA: NORMAL /HPF (ref 0–5)
ETHANOL PERCENT: <0.01 %
ETHANOL: <10 MG/DL
GFR AFRICAN AMERICAN: >60 ML/MIN
GFR NON-AFRICAN AMERICAN: >60 ML/MIN
GFR SERPL CREATININE-BSD FRML MDRD: ABNORMAL ML/MIN/{1.73_M2}
GFR SERPL CREATININE-BSD FRML MDRD: ABNORMAL ML/MIN/{1.73_M2}
GLUCOSE BLD-MCNC: 106 MG/DL (ref 70–99)
GLUCOSE URINE: NEGATIVE
HCT VFR BLD CALC: 36 % (ref 36.3–47.1)
HEMOGLOBIN: 12.6 G/DL (ref 11.9–15.1)
IMMATURE GRANULOCYTES: 0 %
KETONES, URINE: NEGATIVE
LEUKOCYTE ESTERASE, URINE: ABNORMAL
LYMPHOCYTES # BLD: 27 % (ref 24–43)
MCH RBC QN AUTO: 30.1 PG (ref 25.2–33.5)
MCHC RBC AUTO-ENTMCNC: 35 G/DL (ref 28.4–34.8)
MCV RBC AUTO: 86.1 FL (ref 82.6–102.9)
MDMA URINE: ABNORMAL
METHADONE SCREEN, URINE: POSITIVE
METHAMPHETAMINE, URINE: ABNORMAL
MONOCYTES # BLD: 7 % (ref 3–12)
MUCUS: NORMAL
NITRITE, URINE: NEGATIVE
NRBC AUTOMATED: 0 PER 100 WBC
OPIATES, URINE: POSITIVE
OTHER OBSERVATIONS UA: NORMAL
OXYCODONE SCREEN URINE: NEGATIVE
PDW BLD-RTO: 12.4 % (ref 11.8–14.4)
PH UA: 6.5 (ref 5–8)
PHENCYCLIDINE, URINE: NEGATIVE
PLATELET # BLD: 161 K/UL (ref 138–453)
PLATELET ESTIMATE: ABNORMAL
PMV BLD AUTO: 10.8 FL (ref 8.1–13.5)
POTASSIUM SERPL-SCNC: 3.3 MMOL/L (ref 3.7–5.3)
PROPOXYPHENE, URINE: ABNORMAL
PROTEIN UA: NEGATIVE
RBC # BLD: 4.18 M/UL (ref 3.95–5.11)
RBC # BLD: ABNORMAL 10*6/UL
RBC UA: NORMAL /HPF (ref 0–4)
RENAL EPITHELIAL, UA: NORMAL /HPF
SALICYLATE LEVEL: <1 MG/DL (ref 3–10)
SEG NEUTROPHILS: 64 % (ref 36–65)
SEGMENTED NEUTROPHILS ABSOLUTE COUNT: 3.7 K/UL (ref 1.5–8.1)
SODIUM BLD-SCNC: 142 MMOL/L (ref 135–144)
SPECIFIC GRAVITY UA: 1.01 (ref 1–1.03)
TEST INFORMATION: ABNORMAL
TOTAL PROTEIN: 6.8 G/DL (ref 6.4–8.3)
TOXIC TRICYCLIC SC,BLOOD: NEGATIVE
TRICHOMONAS: NORMAL
TRICYCLIC ANTIDEPRESSANTS, UR: ABNORMAL
TURBIDITY: CLEAR
URINE HGB: NEGATIVE
UROBILINOGEN, URINE: NORMAL
WBC # BLD: 5.9 K/UL (ref 3.5–11.3)
WBC # BLD: ABNORMAL 10*3/UL
WBC UA: NORMAL /HPF (ref 0–5)
YEAST: NORMAL

## 2018-05-10 PROCEDURE — 81001 URINALYSIS AUTO W/SCOPE: CPT

## 2018-05-10 PROCEDURE — 85025 COMPLETE CBC W/AUTO DIFF WBC: CPT

## 2018-05-10 PROCEDURE — 93005 ELECTROCARDIOGRAM TRACING: CPT

## 2018-05-10 PROCEDURE — 2580000003 HC RX 258: Performed by: STUDENT IN AN ORGANIZED HEALTH CARE EDUCATION/TRAINING PROGRAM

## 2018-05-10 PROCEDURE — G0480 DRUG TEST DEF 1-7 CLASSES: HCPCS

## 2018-05-10 PROCEDURE — 80307 DRUG TEST PRSMV CHEM ANLYZR: CPT

## 2018-05-10 PROCEDURE — 87086 URINE CULTURE/COLONY COUNT: CPT

## 2018-05-10 PROCEDURE — 86403 PARTICLE AGGLUT ANTBDY SCRN: CPT

## 2018-05-10 PROCEDURE — 87088 URINE BACTERIA CULTURE: CPT

## 2018-05-10 PROCEDURE — 80053 COMPREHEN METABOLIC PANEL: CPT

## 2018-05-10 PROCEDURE — 99285 EMERGENCY DEPT VISIT HI MDM: CPT

## 2018-05-10 RX ORDER — 0.9 % SODIUM CHLORIDE 0.9 %
1000 INTRAVENOUS SOLUTION INTRAVENOUS ONCE
Status: COMPLETED | OUTPATIENT
Start: 2018-05-10 | End: 2018-05-10

## 2018-05-10 RX ADMIN — SODIUM CHLORIDE 1000 ML: 9 INJECTION, SOLUTION INTRAVENOUS at 12:44

## 2018-05-11 ENCOUNTER — HOSPITAL ENCOUNTER (INPATIENT)
Age: 28
LOS: 6 days | Discharge: HOME | DRG: 751 | End: 2018-05-17
Attending: PSYCHIATRY & NEUROLOGY | Admitting: PSYCHIATRY & NEUROLOGY
Payer: MEDICARE

## 2018-05-11 PROBLEM — O99.323 METHADONE MAINTENANCE TREATMENT AFFECTING PREGNANCY IN THIRD TRIMESTER (HCC): Status: RESOLVED | Noted: 2017-04-13 | Resolved: 2018-05-11

## 2018-05-11 PROBLEM — F11.20 METHADONE MAINTENANCE TREATMENT AFFECTING PREGNANCY IN THIRD TRIMESTER (HCC): Status: RESOLVED | Noted: 2017-04-13 | Resolved: 2018-05-11

## 2018-05-11 PROBLEM — F33.2 SEVERE EPISODE OF RECURRENT MAJOR DEPRESSIVE DISORDER, WITHOUT PSYCHOTIC FEATURES (HCC): Status: ACTIVE | Noted: 2018-05-11

## 2018-05-11 PROBLEM — F33.9 MAJOR DEPRESSIVE DISORDER, RECURRENT (HCC): Status: ACTIVE | Noted: 2018-05-11

## 2018-05-11 PROBLEM — F16.10: Status: RESOLVED | Noted: 2017-05-11 | Resolved: 2018-05-11

## 2018-05-11 LAB
CULTURE: ABNORMAL
HCG(URINE) PREGNANCY TEST: NEGATIVE
Lab: ABNORMAL
SPECIMEN DESCRIPTION: ABNORMAL
STATUS: ABNORMAL

## 2018-05-11 PROCEDURE — 84703 CHORIONIC GONADOTROPIN ASSAY: CPT

## 2018-05-11 PROCEDURE — 6370000000 HC RX 637 (ALT 250 FOR IP): Performed by: NURSE PRACTITIONER

## 2018-05-11 PROCEDURE — 90792 PSYCH DIAG EVAL W/MED SRVCS: CPT | Performed by: PSYCHIATRY & NEUROLOGY

## 2018-05-11 PROCEDURE — 6370000000 HC RX 637 (ALT 250 FOR IP): Performed by: PSYCHIATRY & NEUROLOGY

## 2018-05-11 PROCEDURE — 1240000000 HC EMOTIONAL WELLNESS R&B

## 2018-05-11 RX ORDER — CYCLOBENZAPRINE HCL 10 MG
10 TABLET ORAL 3 TIMES DAILY PRN
Status: DISCONTINUED | OUTPATIENT
Start: 2018-05-11 | End: 2018-05-17 | Stop reason: HOSPADM

## 2018-05-11 RX ORDER — GABAPENTIN 300 MG/1
300 CAPSULE ORAL 3 TIMES DAILY
Status: ON HOLD | COMMUNITY
End: 2018-05-17 | Stop reason: HOSPADM

## 2018-05-11 RX ORDER — BENZTROPINE MESYLATE 1 MG/ML
2 INJECTION INTRAMUSCULAR; INTRAVENOUS 2 TIMES DAILY PRN
Status: DISCONTINUED | OUTPATIENT
Start: 2018-05-11 | End: 2018-05-17 | Stop reason: HOSPADM

## 2018-05-11 RX ORDER — LOPERAMIDE HYDROCHLORIDE 2 MG/1
2 CAPSULE ORAL 4 TIMES DAILY PRN
Status: DISCONTINUED | OUTPATIENT
Start: 2018-05-11 | End: 2018-05-17 | Stop reason: HOSPADM

## 2018-05-11 RX ORDER — GABAPENTIN 300 MG/1
300 CAPSULE ORAL 3 TIMES DAILY
Status: DISCONTINUED | OUTPATIENT
Start: 2018-05-11 | End: 2018-05-17 | Stop reason: HOSPADM

## 2018-05-11 RX ORDER — HYDROXYZINE HYDROCHLORIDE 25 MG/1
50 TABLET, FILM COATED ORAL 3 TIMES DAILY PRN
Status: DISCONTINUED | OUTPATIENT
Start: 2018-05-11 | End: 2018-05-17 | Stop reason: HOSPADM

## 2018-05-11 RX ORDER — FLUOXETINE HYDROCHLORIDE 20 MG/1
20 CAPSULE ORAL DAILY
Status: DISCONTINUED | OUTPATIENT
Start: 2018-05-11 | End: 2018-05-16

## 2018-05-11 RX ORDER — CLONIDINE HYDROCHLORIDE 0.1 MG/1
0.1 TABLET ORAL 3 TIMES DAILY
Status: DISCONTINUED | OUTPATIENT
Start: 2018-05-11 | End: 2018-05-11

## 2018-05-11 RX ORDER — TRAZODONE HYDROCHLORIDE 50 MG/1
50 TABLET ORAL NIGHTLY PRN
Status: DISCONTINUED | OUTPATIENT
Start: 2018-05-11 | End: 2018-05-17 | Stop reason: HOSPADM

## 2018-05-11 RX ORDER — ACETAMINOPHEN 325 MG/1
650 TABLET ORAL EVERY 4 HOURS PRN
Status: DISCONTINUED | OUTPATIENT
Start: 2018-05-11 | End: 2018-05-17 | Stop reason: HOSPADM

## 2018-05-11 RX ORDER — CLINDAMYCIN HYDROCHLORIDE 150 MG/1
600 CAPSULE ORAL 3 TIMES DAILY
Status: DISCONTINUED | OUTPATIENT
Start: 2018-05-11 | End: 2018-05-16

## 2018-05-11 RX ORDER — DICYCLOMINE HCL 20 MG
20 TABLET ORAL 4 TIMES DAILY PRN
Status: DISCONTINUED | OUTPATIENT
Start: 2018-05-11 | End: 2018-05-17 | Stop reason: HOSPADM

## 2018-05-11 RX ORDER — GABAPENTIN 100 MG/1
100 CAPSULE ORAL 3 TIMES DAILY
Status: DISCONTINUED | OUTPATIENT
Start: 2018-05-11 | End: 2018-05-11

## 2018-05-11 RX ORDER — FLUOXETINE HYDROCHLORIDE 20 MG/1
20 CAPSULE ORAL DAILY
Status: ON HOLD | COMMUNITY
End: 2018-05-17 | Stop reason: HOSPADM

## 2018-05-11 RX ORDER — NICOTINE 21 MG/24HR
1 PATCH, TRANSDERMAL 24 HOURS TRANSDERMAL DAILY
Status: DISCONTINUED | OUTPATIENT
Start: 2018-05-11 | End: 2018-05-11

## 2018-05-11 RX ORDER — CLINDAMYCIN HYDROCHLORIDE 300 MG/1
600 CAPSULE ORAL 3 TIMES DAILY
Status: ON HOLD | COMMUNITY
End: 2018-05-17 | Stop reason: HOSPADM

## 2018-05-11 RX ORDER — IBUPROFEN 800 MG/1
800 TABLET ORAL 3 TIMES DAILY PRN
Status: DISCONTINUED | OUTPATIENT
Start: 2018-05-11 | End: 2018-05-17 | Stop reason: HOSPADM

## 2018-05-11 RX ORDER — BUPRENORPHINE AND NALOXONE 4; 1 MG/1; MG/1
1 FILM, SOLUBLE BUCCAL; SUBLINGUAL 2 TIMES DAILY
Status: DISCONTINUED | OUTPATIENT
Start: 2018-05-11 | End: 2018-05-17 | Stop reason: HOSPADM

## 2018-05-11 RX ORDER — MAGNESIUM HYDROXIDE/ALUMINUM HYDROXICE/SIMETHICONE 120; 1200; 1200 MG/30ML; MG/30ML; MG/30ML
30 SUSPENSION ORAL EVERY 6 HOURS PRN
Status: DISCONTINUED | OUTPATIENT
Start: 2018-05-11 | End: 2018-05-17 | Stop reason: HOSPADM

## 2018-05-11 RX ADMIN — IBUPROFEN 800 MG: 800 TABLET ORAL at 22:00

## 2018-05-11 RX ADMIN — FLUOXETINE 20 MG: 20 CAPSULE ORAL at 12:46

## 2018-05-11 RX ADMIN — CLINDAMYCIN HYDROCHLORIDE 600 MG: 150 CAPSULE ORAL at 22:01

## 2018-05-11 RX ADMIN — GABAPENTIN 300 MG: 300 CAPSULE ORAL at 22:00

## 2018-05-11 RX ADMIN — CLONIDINE HYDROCHLORIDE 0.1 MG: 0.1 TABLET ORAL at 12:46

## 2018-05-11 RX ADMIN — GABAPENTIN 300 MG: 300 CAPSULE ORAL at 12:45

## 2018-05-11 RX ADMIN — HYDROXYZINE HYDROCHLORIDE 50 MG: 25 TABLET, FILM COATED ORAL at 12:22

## 2018-05-11 RX ADMIN — CYCLOBENZAPRINE HYDROCHLORIDE 10 MG: 10 TABLET, FILM COATED ORAL at 12:45

## 2018-05-11 RX ADMIN — DICYCLOMINE HYDROCHLORIDE 20 MG: 20 TABLET ORAL at 22:01

## 2018-05-11 RX ADMIN — TRAZODONE HYDROCHLORIDE 50 MG: 50 TABLET ORAL at 22:01

## 2018-05-11 RX ADMIN — HYDROXYZINE HYDROCHLORIDE 50 MG: 25 TABLET, FILM COATED ORAL at 22:01

## 2018-05-11 RX ADMIN — CYCLOBENZAPRINE HYDROCHLORIDE 10 MG: 10 TABLET, FILM COATED ORAL at 22:00

## 2018-05-11 RX ADMIN — CLINDAMYCIN HYDROCHLORIDE 600 MG: 150 CAPSULE ORAL at 16:02

## 2018-05-11 RX ADMIN — ACETAMINOPHEN 650 MG: 325 TABLET, FILM COATED ORAL at 12:22

## 2018-05-11 ASSESSMENT — PAIN DESCRIPTION - DESCRIPTORS
DESCRIPTORS: ACHING;CONSTANT;DISCOMFORT
DESCRIPTORS: ACHING;CONSTANT;DISCOMFORT

## 2018-05-11 ASSESSMENT — PAIN DESCRIPTION - LOCATION
LOCATION: LEG;ARM
LOCATION: GENERALIZED

## 2018-05-11 ASSESSMENT — SLEEP AND FATIGUE QUESTIONNAIRES
DO YOU USE A SLEEP AID: NO
SLEEP PATTERN: DIFFICULTY FALLING ASLEEP;DISTURBED/INTERRUPTED SLEEP;INSOMNIA;RESTLESSNESS
DIFFICULTY FALLING ASLEEP: YES
DIFFICULTY STAYING ASLEEP: YES
RESTFUL SLEEP: NO
AVERAGE NUMBER OF SLEEP HOURS: 2
DO YOU HAVE DIFFICULTY SLEEPING: YES
DIFFICULTY ARISING: NO

## 2018-05-11 ASSESSMENT — LIFESTYLE VARIABLES: HISTORY_ALCOHOL_USE: NO

## 2018-05-11 ASSESSMENT — PAIN SCALES - GENERAL
PAINLEVEL_OUTOF10: 5
PAINLEVEL_OUTOF10: 10
PAINLEVEL_OUTOF10: 10
PAINLEVEL_OUTOF10: 6

## 2018-05-11 ASSESSMENT — PAIN DESCRIPTION - PAIN TYPE
TYPE: ACUTE PAIN;OTHER (COMMENT)
TYPE: ACUTE PAIN

## 2018-05-11 ASSESSMENT — PAIN DESCRIPTION - ORIENTATION: ORIENTATION: LEFT;RIGHT

## 2018-05-11 ASSESSMENT — PATIENT HEALTH QUESTIONNAIRE - PHQ9: SUM OF ALL RESPONSES TO PHQ QUESTIONS 1-9: 14

## 2018-05-12 PROCEDURE — 6370000000 HC RX 637 (ALT 250 FOR IP): Performed by: NURSE PRACTITIONER

## 2018-05-12 PROCEDURE — 1240000000 HC EMOTIONAL WELLNESS R&B

## 2018-05-12 PROCEDURE — 6370000000 HC RX 637 (ALT 250 FOR IP): Performed by: PSYCHIATRY & NEUROLOGY

## 2018-05-12 PROCEDURE — 99232 SBSQ HOSP IP/OBS MODERATE 35: CPT | Performed by: PSYCHIATRY & NEUROLOGY

## 2018-05-12 RX ADMIN — CLINDAMYCIN HYDROCHLORIDE 600 MG: 150 CAPSULE ORAL at 08:29

## 2018-05-12 RX ADMIN — GABAPENTIN 300 MG: 300 CAPSULE ORAL at 14:58

## 2018-05-12 RX ADMIN — GABAPENTIN 300 MG: 300 CAPSULE ORAL at 08:29

## 2018-05-12 RX ADMIN — GABAPENTIN 300 MG: 300 CAPSULE ORAL at 21:17

## 2018-05-12 RX ADMIN — BUPRENORPHINE HYDROCHLORIDE, NALOXONE HYDROCHLORIDE 1 FILM: 4; 1 FILM, SOLUBLE BUCCAL; SUBLINGUAL at 10:02

## 2018-05-12 RX ADMIN — TRAZODONE HYDROCHLORIDE 50 MG: 50 TABLET ORAL at 22:37

## 2018-05-12 RX ADMIN — BUPRENORPHINE HYDROCHLORIDE, NALOXONE HYDROCHLORIDE 1 FILM: 4; 1 FILM, SOLUBLE BUCCAL; SUBLINGUAL at 21:44

## 2018-05-12 RX ADMIN — FLUOXETINE 20 MG: 20 CAPSULE ORAL at 08:30

## 2018-05-12 RX ADMIN — CYCLOBENZAPRINE HYDROCHLORIDE 10 MG: 10 TABLET, FILM COATED ORAL at 08:30

## 2018-05-12 RX ADMIN — CLINDAMYCIN HYDROCHLORIDE 600 MG: 150 CAPSULE ORAL at 14:58

## 2018-05-12 RX ADMIN — CLINDAMYCIN HYDROCHLORIDE 600 MG: 150 CAPSULE ORAL at 21:17

## 2018-05-12 ASSESSMENT — LIFESTYLE VARIABLES: HISTORY_ALCOHOL_USE: NO

## 2018-05-12 ASSESSMENT — PAIN SCALES - GENERAL
PAINLEVEL_OUTOF10: 0
PAINLEVEL_OUTOF10: 2

## 2018-05-13 PROCEDURE — 6370000000 HC RX 637 (ALT 250 FOR IP): Performed by: PSYCHIATRY & NEUROLOGY

## 2018-05-13 PROCEDURE — 1240000000 HC EMOTIONAL WELLNESS R&B

## 2018-05-13 PROCEDURE — 6370000000 HC RX 637 (ALT 250 FOR IP): Performed by: NURSE PRACTITIONER

## 2018-05-13 RX ADMIN — GABAPENTIN 300 MG: 300 CAPSULE ORAL at 22:52

## 2018-05-13 RX ADMIN — BUPRENORPHINE HYDROCHLORIDE, NALOXONE HYDROCHLORIDE 1 FILM: 4; 1 FILM, SOLUBLE BUCCAL; SUBLINGUAL at 20:14

## 2018-05-13 RX ADMIN — GABAPENTIN 300 MG: 300 CAPSULE ORAL at 08:35

## 2018-05-13 RX ADMIN — BUPRENORPHINE HYDROCHLORIDE, NALOXONE HYDROCHLORIDE 1 FILM: 4; 1 FILM, SOLUBLE BUCCAL; SUBLINGUAL at 08:35

## 2018-05-13 RX ADMIN — TRAZODONE HYDROCHLORIDE 50 MG: 50 TABLET ORAL at 22:52

## 2018-05-13 RX ADMIN — DICYCLOMINE HYDROCHLORIDE 20 MG: 20 TABLET ORAL at 08:34

## 2018-05-13 RX ADMIN — CLINDAMYCIN HYDROCHLORIDE 600 MG: 150 CAPSULE ORAL at 14:29

## 2018-05-13 RX ADMIN — FLUOXETINE 20 MG: 20 CAPSULE ORAL at 08:35

## 2018-05-13 RX ADMIN — GABAPENTIN 300 MG: 300 CAPSULE ORAL at 14:30

## 2018-05-13 RX ADMIN — CLINDAMYCIN HYDROCHLORIDE 600 MG: 150 CAPSULE ORAL at 08:34

## 2018-05-13 RX ADMIN — CLINDAMYCIN HYDROCHLORIDE 600 MG: 150 CAPSULE ORAL at 22:52

## 2018-05-13 ASSESSMENT — PAIN - FUNCTIONAL ASSESSMENT: PAIN_FUNCTIONAL_ASSESSMENT: 0-10

## 2018-05-13 ASSESSMENT — PAIN SCALES - GENERAL: PAINLEVEL_OUTOF10: 1

## 2018-05-14 PROCEDURE — 6370000000 HC RX 637 (ALT 250 FOR IP): Performed by: NURSE PRACTITIONER

## 2018-05-14 PROCEDURE — 6370000000 HC RX 637 (ALT 250 FOR IP): Performed by: PSYCHIATRY & NEUROLOGY

## 2018-05-14 PROCEDURE — 99232 SBSQ HOSP IP/OBS MODERATE 35: CPT | Performed by: REGISTERED NURSE

## 2018-05-14 PROCEDURE — 1240000000 HC EMOTIONAL WELLNESS R&B

## 2018-05-14 RX ADMIN — FLUOXETINE 20 MG: 20 CAPSULE ORAL at 08:38

## 2018-05-14 RX ADMIN — GABAPENTIN 300 MG: 300 CAPSULE ORAL at 08:37

## 2018-05-14 RX ADMIN — IBUPROFEN 800 MG: 800 TABLET ORAL at 08:38

## 2018-05-14 RX ADMIN — CYCLOBENZAPRINE HYDROCHLORIDE 10 MG: 10 TABLET, FILM COATED ORAL at 23:55

## 2018-05-14 RX ADMIN — CYCLOBENZAPRINE HYDROCHLORIDE 10 MG: 10 TABLET, FILM COATED ORAL at 14:18

## 2018-05-14 RX ADMIN — BUPRENORPHINE HYDROCHLORIDE, NALOXONE HYDROCHLORIDE 1 FILM: 4; 1 FILM, SOLUBLE BUCCAL; SUBLINGUAL at 21:21

## 2018-05-14 RX ADMIN — GABAPENTIN 300 MG: 300 CAPSULE ORAL at 14:15

## 2018-05-14 RX ADMIN — GABAPENTIN 300 MG: 300 CAPSULE ORAL at 21:20

## 2018-05-14 RX ADMIN — CLINDAMYCIN HYDROCHLORIDE 600 MG: 150 CAPSULE ORAL at 14:15

## 2018-05-14 RX ADMIN — BUPRENORPHINE HYDROCHLORIDE, NALOXONE HYDROCHLORIDE 1 FILM: 4; 1 FILM, SOLUBLE BUCCAL; SUBLINGUAL at 08:38

## 2018-05-14 RX ADMIN — CYCLOBENZAPRINE HYDROCHLORIDE 10 MG: 10 TABLET, FILM COATED ORAL at 08:38

## 2018-05-14 RX ADMIN — HYDROXYZINE HYDROCHLORIDE 50 MG: 25 TABLET, FILM COATED ORAL at 23:55

## 2018-05-14 RX ADMIN — HYDROXYZINE HYDROCHLORIDE 50 MG: 25 TABLET, FILM COATED ORAL at 14:19

## 2018-05-14 RX ADMIN — TRAZODONE HYDROCHLORIDE 50 MG: 50 TABLET ORAL at 21:20

## 2018-05-14 RX ADMIN — CLINDAMYCIN HYDROCHLORIDE 600 MG: 150 CAPSULE ORAL at 08:37

## 2018-05-14 RX ADMIN — CLINDAMYCIN HYDROCHLORIDE 600 MG: 150 CAPSULE ORAL at 21:20

## 2018-05-14 RX ADMIN — HYDROXYZINE HYDROCHLORIDE 50 MG: 25 TABLET, FILM COATED ORAL at 08:37

## 2018-05-14 ASSESSMENT — PAIN SCALES - GENERAL
PAINLEVEL_OUTOF10: 5
PAINLEVEL_OUTOF10: 0
PAINLEVEL_OUTOF10: 0

## 2018-05-14 ASSESSMENT — PAIN DESCRIPTION - LOCATION: LOCATION: GENERALIZED

## 2018-05-14 ASSESSMENT — PAIN DESCRIPTION - PAIN TYPE: TYPE: ACUTE PAIN

## 2018-05-15 PROCEDURE — 1240000000 HC EMOTIONAL WELLNESS R&B

## 2018-05-15 PROCEDURE — 6370000000 HC RX 637 (ALT 250 FOR IP): Performed by: PSYCHIATRY & NEUROLOGY

## 2018-05-15 PROCEDURE — 99221 1ST HOSP IP/OBS SF/LOW 40: CPT | Performed by: INTERNAL MEDICINE

## 2018-05-15 PROCEDURE — 6370000000 HC RX 637 (ALT 250 FOR IP): Performed by: NURSE PRACTITIONER

## 2018-05-15 RX ADMIN — GABAPENTIN 300 MG: 300 CAPSULE ORAL at 21:28

## 2018-05-15 RX ADMIN — BUPRENORPHINE HYDROCHLORIDE, NALOXONE HYDROCHLORIDE 1 FILM: 4; 1 FILM, SOLUBLE BUCCAL; SUBLINGUAL at 09:14

## 2018-05-15 RX ADMIN — CLINDAMYCIN HYDROCHLORIDE 600 MG: 150 CAPSULE ORAL at 21:28

## 2018-05-15 RX ADMIN — HYDROXYZINE HYDROCHLORIDE 50 MG: 25 TABLET, FILM COATED ORAL at 09:14

## 2018-05-15 RX ADMIN — CYCLOBENZAPRINE HYDROCHLORIDE 10 MG: 10 TABLET, FILM COATED ORAL at 09:14

## 2018-05-15 RX ADMIN — HYDROXYZINE HYDROCHLORIDE 50 MG: 25 TABLET, FILM COATED ORAL at 16:54

## 2018-05-15 RX ADMIN — GABAPENTIN 300 MG: 300 CAPSULE ORAL at 09:14

## 2018-05-15 RX ADMIN — CLINDAMYCIN HYDROCHLORIDE 600 MG: 150 CAPSULE ORAL at 09:14

## 2018-05-15 RX ADMIN — BUPRENORPHINE HYDROCHLORIDE, NALOXONE HYDROCHLORIDE 1 FILM: 4; 1 FILM, SOLUBLE BUCCAL; SUBLINGUAL at 21:28

## 2018-05-15 RX ADMIN — CYCLOBENZAPRINE HYDROCHLORIDE 10 MG: 10 TABLET, FILM COATED ORAL at 21:28

## 2018-05-15 RX ADMIN — CLINDAMYCIN HYDROCHLORIDE 600 MG: 150 CAPSULE ORAL at 14:19

## 2018-05-15 RX ADMIN — TRAZODONE HYDROCHLORIDE 50 MG: 50 TABLET ORAL at 21:28

## 2018-05-15 RX ADMIN — CYCLOBENZAPRINE HYDROCHLORIDE 10 MG: 10 TABLET, FILM COATED ORAL at 16:53

## 2018-05-15 RX ADMIN — DICYCLOMINE HYDROCHLORIDE 20 MG: 20 TABLET ORAL at 21:28

## 2018-05-15 RX ADMIN — GABAPENTIN 300 MG: 300 CAPSULE ORAL at 14:19

## 2018-05-15 RX ADMIN — FLUOXETINE 20 MG: 20 CAPSULE ORAL at 09:14

## 2018-05-15 ASSESSMENT — PAIN SCALES - GENERAL
PAINLEVEL_OUTOF10: 0
PAINLEVEL_OUTOF10: 0

## 2018-05-16 PROCEDURE — 6370000000 HC RX 637 (ALT 250 FOR IP): Performed by: PSYCHIATRY & NEUROLOGY

## 2018-05-16 PROCEDURE — 99232 SBSQ HOSP IP/OBS MODERATE 35: CPT | Performed by: REGISTERED NURSE

## 2018-05-16 PROCEDURE — 1240000000 HC EMOTIONAL WELLNESS R&B

## 2018-05-16 PROCEDURE — 6370000000 HC RX 637 (ALT 250 FOR IP): Performed by: NURSE PRACTITIONER

## 2018-05-16 PROCEDURE — 99231 SBSQ HOSP IP/OBS SF/LOW 25: CPT | Performed by: INTERNAL MEDICINE

## 2018-05-16 RX ORDER — PROMETHAZINE HYDROCHLORIDE 12.5 MG/1
12.5 TABLET ORAL EVERY 6 HOURS PRN
Status: DISCONTINUED | OUTPATIENT
Start: 2018-05-16 | End: 2018-05-17 | Stop reason: HOSPADM

## 2018-05-16 RX ORDER — DIPHENHYDRAMINE HCL 25 MG
50 TABLET ORAL EVERY 4 HOURS PRN
Status: DISCONTINUED | OUTPATIENT
Start: 2018-05-16 | End: 2018-05-17 | Stop reason: HOSPADM

## 2018-05-16 RX ORDER — FLUOXETINE HYDROCHLORIDE 20 MG/1
40 CAPSULE ORAL DAILY
Status: DISCONTINUED | OUTPATIENT
Start: 2018-05-17 | End: 2018-05-17 | Stop reason: HOSPADM

## 2018-05-16 RX ADMIN — BUPRENORPHINE HYDROCHLORIDE, NALOXONE HYDROCHLORIDE 1 FILM: 4; 1 FILM, SOLUBLE BUCCAL; SUBLINGUAL at 21:42

## 2018-05-16 RX ADMIN — BUPRENORPHINE HYDROCHLORIDE, NALOXONE HYDROCHLORIDE 1 FILM: 4; 1 FILM, SOLUBLE BUCCAL; SUBLINGUAL at 08:47

## 2018-05-16 RX ADMIN — HYDROXYZINE HYDROCHLORIDE 50 MG: 25 TABLET, FILM COATED ORAL at 21:43

## 2018-05-16 RX ADMIN — GABAPENTIN 300 MG: 300 CAPSULE ORAL at 14:10

## 2018-05-16 RX ADMIN — GABAPENTIN 300 MG: 300 CAPSULE ORAL at 21:42

## 2018-05-16 RX ADMIN — FLUOXETINE 20 MG: 20 CAPSULE ORAL at 08:47

## 2018-05-16 RX ADMIN — CYCLOBENZAPRINE HYDROCHLORIDE 10 MG: 10 TABLET, FILM COATED ORAL at 21:42

## 2018-05-16 RX ADMIN — GABAPENTIN 300 MG: 300 CAPSULE ORAL at 08:47

## 2018-05-16 RX ADMIN — TRAZODONE HYDROCHLORIDE 50 MG: 50 TABLET ORAL at 21:42

## 2018-05-16 RX ADMIN — ALUMINUM HYDROXIDE, MAGNESIUM HYDROXIDE, AND SIMETHICONE 30 ML: 200; 200; 20 SUSPENSION ORAL at 14:45

## 2018-05-16 RX ADMIN — DIPHENHYDRAMINE HCL 50 MG: 25 TABLET ORAL at 20:14

## 2018-05-16 RX ADMIN — HYDROXYZINE HYDROCHLORIDE 50 MG: 25 TABLET, FILM COATED ORAL at 08:47

## 2018-05-16 RX ADMIN — CLINDAMYCIN HYDROCHLORIDE 600 MG: 150 CAPSULE ORAL at 08:47

## 2018-05-16 RX ADMIN — CLINDAMYCIN HYDROCHLORIDE 600 MG: 150 CAPSULE ORAL at 14:10

## 2018-05-16 RX ADMIN — CYCLOBENZAPRINE HYDROCHLORIDE 10 MG: 10 TABLET, FILM COATED ORAL at 08:47

## 2018-05-16 ASSESSMENT — PAIN SCALES - GENERAL
PAINLEVEL_OUTOF10: 0
PAINLEVEL_OUTOF10: 2

## 2018-05-17 VITALS
DIASTOLIC BLOOD PRESSURE: 57 MMHG | SYSTOLIC BLOOD PRESSURE: 107 MMHG | TEMPERATURE: 98.2 F | BODY MASS INDEX: 28.85 KG/M2 | HEART RATE: 122 BPM | HEIGHT: 64 IN | RESPIRATION RATE: 14 BRPM | WEIGHT: 169 LBS | OXYGEN SATURATION: 98 %

## 2018-05-17 PROCEDURE — 6360000002 HC RX W HCPCS: Performed by: REGISTERED NURSE

## 2018-05-17 PROCEDURE — 99239 HOSP IP/OBS DSCHRG MGMT >30: CPT | Performed by: REGISTERED NURSE

## 2018-05-17 PROCEDURE — 6370000000 HC RX 637 (ALT 250 FOR IP): Performed by: REGISTERED NURSE

## 2018-05-17 PROCEDURE — 6370000000 HC RX 637 (ALT 250 FOR IP): Performed by: PSYCHIATRY & NEUROLOGY

## 2018-05-17 PROCEDURE — 5130000000 HC BRIDGE APPOINTMENT

## 2018-05-17 PROCEDURE — 6370000000 HC RX 637 (ALT 250 FOR IP): Performed by: NURSE PRACTITIONER

## 2018-05-17 RX ORDER — PROMETHAZINE HYDROCHLORIDE 12.5 MG/1
12.5 TABLET ORAL EVERY 6 HOURS PRN
Qty: 28 TABLET | Refills: 0 | Status: SHIPPED | OUTPATIENT
Start: 2018-05-17 | End: 2018-05-24

## 2018-05-17 RX ORDER — TRAZODONE HYDROCHLORIDE 50 MG/1
50 TABLET ORAL NIGHTLY PRN
Qty: 14 TABLET | Refills: 0 | Status: ON HOLD | OUTPATIENT
Start: 2018-05-17 | End: 2018-12-06

## 2018-05-17 RX ORDER — FLUOXETINE HYDROCHLORIDE 40 MG/1
40 CAPSULE ORAL DAILY
Qty: 14 CAPSULE | Refills: 0 | Status: ON HOLD | OUTPATIENT
Start: 2018-05-18 | End: 2018-12-06

## 2018-05-17 RX ORDER — HYDROXYZINE 50 MG/1
50 TABLET, FILM COATED ORAL 3 TIMES DAILY PRN
Qty: 42 TABLET | Refills: 0 | Status: SHIPPED | OUTPATIENT
Start: 2018-05-17 | End: 2018-05-31

## 2018-05-17 RX ADMIN — CYCLOBENZAPRINE HYDROCHLORIDE 10 MG: 10 TABLET, FILM COATED ORAL at 08:36

## 2018-05-17 RX ADMIN — FLUOXETINE 40 MG: 20 CAPSULE ORAL at 08:36

## 2018-05-17 RX ADMIN — BUPRENORPHINE HYDROCHLORIDE, NALOXONE HYDROCHLORIDE 1 FILM: 4; 1 FILM, SOLUBLE BUCCAL; SUBLINGUAL at 08:36

## 2018-05-17 RX ADMIN — PROMETHAZINE HYDROCHLORIDE 12.5 MG: 12.5 TABLET ORAL at 09:59

## 2018-05-17 RX ADMIN — HYDROXYZINE HYDROCHLORIDE 50 MG: 25 TABLET, FILM COATED ORAL at 08:36

## 2018-05-17 RX ADMIN — GABAPENTIN 300 MG: 300 CAPSULE ORAL at 08:36

## 2018-05-17 ASSESSMENT — PAIN SCALES - GENERAL: PAINLEVEL_OUTOF10: 0

## 2018-09-26 PROBLEM — N39.0 UTI (URINARY TRACT INFECTION): Status: RESOLVED | Noted: 2017-02-20 | Resolved: 2018-09-26

## 2018-12-06 ENCOUNTER — HOSPITAL ENCOUNTER (INPATIENT)
Age: 28
LOS: 4 days | Discharge: HOME | DRG: 753 | End: 2018-12-10
Attending: EMERGENCY MEDICINE | Admitting: PSYCHIATRY & NEUROLOGY
Payer: MEDICARE

## 2018-12-06 DIAGNOSIS — F11.23 OPIOID DEPENDENCE WITH WITHDRAWAL (HCC): ICD-10-CM

## 2018-12-06 DIAGNOSIS — F19.10 POLYSUBSTANCE ABUSE (HCC): ICD-10-CM

## 2018-12-06 DIAGNOSIS — R45.851 SUICIDAL IDEATION: Primary | ICD-10-CM

## 2018-12-06 PROBLEM — F33.2 SEVERE RECURRENT MAJOR DEPRESSION WITHOUT PSYCHOTIC FEATURES (HCC): Status: ACTIVE | Noted: 2018-12-06

## 2018-12-06 PROBLEM — F19.20 POLYSUBSTANCE DEPENDENCE (HCC): Chronic | Status: ACTIVE | Noted: 2018-12-06

## 2018-12-06 LAB
-: ABNORMAL
AMORPHOUS: ABNORMAL
AMPHETAMINE SCREEN URINE: NEGATIVE
BACTERIA: ABNORMAL
BARBITURATE SCREEN URINE: NEGATIVE
BENZODIAZEPINE SCREEN, URINE: NEGATIVE
BILIRUBIN URINE: NEGATIVE
BUPRENORPHINE URINE: ABNORMAL
CANNABINOID SCREEN URINE: POSITIVE
CASTS UA: ABNORMAL /LPF
COCAINE METABOLITE, URINE: POSITIVE
COLOR: ABNORMAL
COMMENT UA: ABNORMAL
CRYSTALS, UA: ABNORMAL /HPF
EPITHELIAL CELLS UA: ABNORMAL /HPF
GLUCOSE URINE: NEGATIVE
HCG(URINE) PREGNANCY TEST: NEGATIVE
KETONES, URINE: ABNORMAL
LEUKOCYTE ESTERASE, URINE: ABNORMAL
MDMA URINE: ABNORMAL
METHADONE SCREEN, URINE: NEGATIVE
METHAMPHETAMINE, URINE: ABNORMAL
MUCUS: ABNORMAL
NITRITE, URINE: POSITIVE
OPIATES, URINE: POSITIVE
OTHER OBSERVATIONS UA: ABNORMAL
OXYCODONE SCREEN URINE: NEGATIVE
PH UA: 6.5 (ref 5–8)
PHENCYCLIDINE, URINE: NEGATIVE
PROPOXYPHENE, URINE: ABNORMAL
PROTEIN UA: NEGATIVE
RBC UA: ABNORMAL /HPF
RENAL EPITHELIAL, UA: ABNORMAL /HPF
SPECIFIC GRAVITY UA: 1.02 (ref 1–1.03)
TEST INFORMATION: ABNORMAL
TRICHOMONAS: ABNORMAL
TRICYCLIC ANTIDEPRESSANTS, UR: ABNORMAL
TURBIDITY: ABNORMAL
URINE HGB: NEGATIVE
UROBILINOGEN, URINE: NORMAL
WBC UA: ABNORMAL /HPF
YEAST: ABNORMAL

## 2018-12-06 PROCEDURE — 87186 SC STD MICRODIL/AGAR DIL: CPT

## 2018-12-06 PROCEDURE — 6370000000 HC RX 637 (ALT 250 FOR IP): Performed by: PSYCHIATRY & NEUROLOGY

## 2018-12-06 PROCEDURE — 99285 EMERGENCY DEPT VISIT HI MDM: CPT

## 2018-12-06 PROCEDURE — 81001 URINALYSIS AUTO W/SCOPE: CPT

## 2018-12-06 PROCEDURE — 87086 URINE CULTURE/COLONY COUNT: CPT

## 2018-12-06 PROCEDURE — 80307 DRUG TEST PRSMV CHEM ANLYZR: CPT

## 2018-12-06 PROCEDURE — 87077 CULTURE AEROBIC IDENTIFY: CPT

## 2018-12-06 PROCEDURE — 6360000002 HC RX W HCPCS: Performed by: NURSE PRACTITIONER

## 2018-12-06 PROCEDURE — 84703 CHORIONIC GONADOTROPIN ASSAY: CPT

## 2018-12-06 PROCEDURE — 1240000000 HC EMOTIONAL WELLNESS R&B

## 2018-12-06 RX ORDER — HYDROXYZINE 50 MG/1
50 TABLET, FILM COATED ORAL 3 TIMES DAILY PRN
Status: DISCONTINUED | OUTPATIENT
Start: 2018-12-06 | End: 2018-12-10 | Stop reason: HOSPADM

## 2018-12-06 RX ORDER — DICYCLOMINE HCL 20 MG
20 TABLET ORAL 4 TIMES DAILY PRN
Status: DISCONTINUED | OUTPATIENT
Start: 2018-12-06 | End: 2018-12-07

## 2018-12-06 RX ORDER — HYDROXYZINE HYDROCHLORIDE 25 MG/1
25 TABLET, FILM COATED ORAL 3 TIMES DAILY PRN
Status: DISCONTINUED | OUTPATIENT
Start: 2018-12-06 | End: 2018-12-06

## 2018-12-06 RX ORDER — CLONIDINE HYDROCHLORIDE 0.1 MG/1
0.1 TABLET ORAL 3 TIMES DAILY
Status: DISCONTINUED | OUTPATIENT
Start: 2018-12-06 | End: 2018-12-07

## 2018-12-06 RX ORDER — NICOTINE 21 MG/24HR
1 PATCH, TRANSDERMAL 24 HOURS TRANSDERMAL DAILY
Status: DISCONTINUED | OUTPATIENT
Start: 2018-12-06 | End: 2018-12-06

## 2018-12-06 RX ORDER — BENZTROPINE MESYLATE 1 MG/ML
2 INJECTION INTRAMUSCULAR; INTRAVENOUS 2 TIMES DAILY PRN
Status: DISCONTINUED | OUTPATIENT
Start: 2018-12-06 | End: 2018-12-10 | Stop reason: HOSPADM

## 2018-12-06 RX ORDER — TRAZODONE HYDROCHLORIDE 50 MG/1
50 TABLET ORAL NIGHTLY PRN
Status: DISCONTINUED | OUTPATIENT
Start: 2018-12-06 | End: 2018-12-10 | Stop reason: HOSPADM

## 2018-12-06 RX ORDER — ONDANSETRON 4 MG/1
8 TABLET, FILM COATED ORAL EVERY 8 HOURS PRN
Status: DISCONTINUED | OUTPATIENT
Start: 2018-12-06 | End: 2018-12-07

## 2018-12-06 RX ORDER — MAGNESIUM HYDROXIDE/ALUMINUM HYDROXICE/SIMETHICONE 120; 1200; 1200 MG/30ML; MG/30ML; MG/30ML
30 SUSPENSION ORAL EVERY 6 HOURS PRN
Status: DISCONTINUED | OUTPATIENT
Start: 2018-12-06 | End: 2018-12-10 | Stop reason: HOSPADM

## 2018-12-06 RX ORDER — CYCLOBENZAPRINE HCL 10 MG
10 TABLET ORAL 3 TIMES DAILY PRN
Status: DISCONTINUED | OUTPATIENT
Start: 2018-12-06 | End: 2018-12-07

## 2018-12-06 RX ORDER — LOPERAMIDE HYDROCHLORIDE 2 MG/1
2 CAPSULE ORAL 4 TIMES DAILY PRN
Status: DISCONTINUED | OUTPATIENT
Start: 2018-12-06 | End: 2018-12-07

## 2018-12-06 RX ORDER — ACETAMINOPHEN 325 MG/1
650 TABLET ORAL EVERY 4 HOURS PRN
Status: DISCONTINUED | OUTPATIENT
Start: 2018-12-06 | End: 2018-12-10 | Stop reason: HOSPADM

## 2018-12-06 RX ORDER — IBUPROFEN 800 MG/1
800 TABLET ORAL 3 TIMES DAILY PRN
Status: DISCONTINUED | OUTPATIENT
Start: 2018-12-06 | End: 2018-12-10 | Stop reason: HOSPADM

## 2018-12-06 RX ADMIN — ONDANSETRON HYDROCHLORIDE 8 MG: 4 TABLET, FILM COATED ORAL at 23:37

## 2018-12-06 RX ADMIN — TRAZODONE HYDROCHLORIDE 50 MG: 50 TABLET ORAL at 19:18

## 2018-12-06 RX ADMIN — DICYCLOMINE HYDROCHLORIDE 20 MG: 20 TABLET ORAL at 19:18

## 2018-12-06 RX ADMIN — IBUPROFEN 800 MG: 800 TABLET ORAL at 19:18

## 2018-12-06 RX ADMIN — CYCLOBENZAPRINE HYDROCHLORIDE 10 MG: 10 TABLET, FILM COATED ORAL at 19:18

## 2018-12-06 RX ADMIN — HYDROXYZINE HYDROCHLORIDE 50 MG: 50 TABLET, FILM COATED ORAL at 19:18

## 2018-12-06 RX ADMIN — CLONIDINE HYDROCHLORIDE 0.1 MG: 0.1 TABLET ORAL at 19:18

## 2018-12-06 ASSESSMENT — SLEEP AND FATIGUE QUESTIONNAIRES
SLEEP PATTERN: DIFFICULTY FALLING ASLEEP;DISTURBED/INTERRUPTED SLEEP;RESTLESSNESS
DO YOU USE A SLEEP AID: NO
DO YOU HAVE DIFFICULTY SLEEPING: YES
DIFFICULTY FALLING ASLEEP: YES
RESTFUL SLEEP: NO
AVERAGE NUMBER OF SLEEP HOURS: 3
DIFFICULTY STAYING ASLEEP: YES
DIFFICULTY ARISING: NO

## 2018-12-06 ASSESSMENT — PATIENT HEALTH QUESTIONNAIRE - PHQ9: SUM OF ALL RESPONSES TO PHQ QUESTIONS 1-9: 23

## 2018-12-06 ASSESSMENT — PAIN DESCRIPTION - LOCATION
LOCATION: GENERALIZED
LOCATION: GENERALIZED

## 2018-12-06 ASSESSMENT — PAIN SCALES - GENERAL
PAINLEVEL_OUTOF10: 2
PAINLEVEL_OUTOF10: 5
PAINLEVEL_OUTOF10: 6
PAINLEVEL_OUTOF10: 5

## 2018-12-06 ASSESSMENT — LIFESTYLE VARIABLES: HISTORY_ALCOHOL_USE: NO

## 2018-12-06 NOTE — ED PROVIDER NOTES
Collette Mackie, MD at [facility] L&D OR    [facility]  2008    [place]  DELIVERY ONLY  10-21-14    , Low Cervical     CURRENT MEDICATIONS       Previous Medications    FLUOXETINE (PROZAC) 40 MG CAPSULE    Take 1 capsule by mouth daily    GABAPENTIN (NEURONTIN) 100 MG CAPSULE    Take 100 mg by mouth 3 times daily. HYDROCORTISONE (ANUSOL-HC) 2.5 % RECTAL CREAM    Place rectally 2 times daily. IBUPROFEN (ADVIL;MOTRIN) 800 MG TABLET    Take 1 tablet by mouth every 8 hours as needed for Pain    TRAZODONE (DESYREL) 50 MG TABLET    Take 1 tablet by mouth nightly as needed for Sleep     ALLERGIES     is allergic to penicillins. FAMILY HISTORY     has no family status information on file. SOCIAL HISTORY       Social History   Substance Use Topics    Smoking status: Never Smoker    Smokeless tobacco: Never Used    Alcohol use No      Comment: prior abuse     PHYSICAL EXAM     INITIAL VITALS: BP (!) 99/58   Pulse 90   Temp 98.4 °F (36.9 °C) (Oral)   Resp 16   Ht 5' 4\" (1.626 m)   Wt 110 lb (49.9 kg)   LMP  (LMP Unknown)   SpO2 100%   BMI 18.88 kg/m²    Physical Exam  Gen. well-developed well-nourished white female in no acute distress  Vital signs as per the nursing notes and  Neck supple without JVD thyromegaly or lymphadenopathy  Chest clear to auscultation  Cor regular regular rhythm normal S1 normal S2 without appreciable murmurs or gallops  Abdomen soft, positive bowel sounds, negative for rebound guarding or palpable masses  Extremities without clubbing cyanosis or edema  Neuro grossly intact and nonfocal  MEDICAL DECISION MAKING:   The saline concern with this patient was here she a real suicide risk. Given the patient's history we felt it imperative that the patient be admitted for further evaluation. She requested that the nurse urine T she would get Suboxone during her admission which the nurse cannot promise. This is at the doctor's discretion and this was explained to the patient.

## 2018-12-07 PROBLEM — F19.20 POLYSUBSTANCE DEPENDENCE (HCC): Status: ACTIVE | Noted: 2018-12-06

## 2018-12-07 PROBLEM — F11.23 OPIOID DEPENDENCE WITH WITHDRAWAL (HCC): Status: ACTIVE | Noted: 2018-12-06

## 2018-12-07 PROBLEM — F31.30 BIPOLAR I DISORDER, MOST RECENT EPISODE DEPRESSED (HCC): Chronic | Status: ACTIVE | Noted: 2018-12-07

## 2018-12-07 PROBLEM — F31.30 BIPOLAR I DISORDER, MOST RECENT EPISODE DEPRESSED (HCC): Status: ACTIVE | Noted: 2018-12-07

## 2018-12-07 PROCEDURE — 6360000002 HC RX W HCPCS: Performed by: NURSE PRACTITIONER

## 2018-12-07 PROCEDURE — 1240000000 HC EMOTIONAL WELLNESS R&B

## 2018-12-07 PROCEDURE — 6370000000 HC RX 637 (ALT 250 FOR IP): Performed by: PSYCHIATRY & NEUROLOGY

## 2018-12-07 PROCEDURE — 6360000002 HC RX W HCPCS: Performed by: PSYCHIATRY & NEUROLOGY

## 2018-12-07 PROCEDURE — 90792 PSYCH DIAG EVAL W/MED SRVCS: CPT | Performed by: PSYCHIATRY & NEUROLOGY

## 2018-12-07 RX ORDER — BUPRENORPHINE HYDROCHLORIDE AND NALOXONE HYDROCHLORIDE DIHYDRATE 8; 2 MG/1; MG/1
1 TABLET SUBLINGUAL 2 TIMES DAILY
Status: DISCONTINUED | OUTPATIENT
Start: 2018-12-07 | End: 2018-12-07

## 2018-12-07 RX ORDER — VILAZODONE HYDROCHLORIDE 10 MG/1
10 TABLET ORAL DAILY
Status: DISCONTINUED | OUTPATIENT
Start: 2018-12-07 | End: 2018-12-08

## 2018-12-07 RX ORDER — BUPRENORPHINE HYDROCHLORIDE AND NALOXONE HYDROCHLORIDE DIHYDRATE 8; 2 MG/1; MG/1
1 TABLET SUBLINGUAL 2 TIMES DAILY
Status: DISCONTINUED | OUTPATIENT
Start: 2018-12-07 | End: 2018-12-10 | Stop reason: HOSPADM

## 2018-12-07 RX ADMIN — VILAZODONE HYDROCHLORIDE 10 MG: 10 TABLET ORAL at 16:39

## 2018-12-07 RX ADMIN — CLONIDINE HYDROCHLORIDE 0.1 MG: 0.1 TABLET ORAL at 11:40

## 2018-12-07 RX ADMIN — ACETAMINOPHEN 650 MG: 325 TABLET, FILM COATED ORAL at 06:24

## 2018-12-07 RX ADMIN — CYCLOBENZAPRINE HYDROCHLORIDE 10 MG: 10 TABLET, FILM COATED ORAL at 11:40

## 2018-12-07 RX ADMIN — CYCLOBENZAPRINE HYDROCHLORIDE 10 MG: 10 TABLET, FILM COATED ORAL at 00:22

## 2018-12-07 RX ADMIN — IBUPROFEN 800 MG: 800 TABLET ORAL at 11:40

## 2018-12-07 RX ADMIN — HYDROXYZINE HYDROCHLORIDE 50 MG: 50 TABLET, FILM COATED ORAL at 11:40

## 2018-12-07 RX ADMIN — LURASIDONE HYDROCHLORIDE 40 MG: 40 TABLET, FILM COATED ORAL at 16:39

## 2018-12-07 RX ADMIN — IBUPROFEN 800 MG: 800 TABLET ORAL at 00:22

## 2018-12-07 RX ADMIN — LOPERAMIDE HYDROCHLORIDE 2 MG: 2 CAPSULE ORAL at 11:40

## 2018-12-07 RX ADMIN — DICYCLOMINE HYDROCHLORIDE 20 MG: 20 TABLET ORAL at 00:21

## 2018-12-07 RX ADMIN — BUPRENORPHINE AND NALOXONE 1 TABLET: 8; 2 TABLET SUBLINGUAL at 16:35

## 2018-12-07 RX ADMIN — ONDANSETRON HYDROCHLORIDE 8 MG: 4 TABLET, FILM COATED ORAL at 11:40

## 2018-12-07 RX ADMIN — DICYCLOMINE HYDROCHLORIDE 20 MG: 20 TABLET ORAL at 06:24

## 2018-12-07 RX ADMIN — HYDROXYZINE HYDROCHLORIDE 50 MG: 50 TABLET, FILM COATED ORAL at 00:21

## 2018-12-07 ASSESSMENT — PAIN DESCRIPTION - LOCATION
LOCATION: GENERALIZED
LOCATION: GENERALIZED

## 2018-12-07 ASSESSMENT — PAIN SCALES - GENERAL
PAINLEVEL_OUTOF10: 0
PAINLEVEL_OUTOF10: 5
PAINLEVEL_OUTOF10: 1
PAINLEVEL_OUTOF10: 2
PAINLEVEL_OUTOF10: 4
PAINLEVEL_OUTOF10: 2
PAINLEVEL_OUTOF10: 3
PAINLEVEL_OUTOF10: 1
PAINLEVEL_OUTOF10: 8

## 2018-12-07 ASSESSMENT — LIFESTYLE VARIABLES: HISTORY_ALCOHOL_USE: NO

## 2018-12-08 PROBLEM — F33.2 SEVERE RECURRENT MAJOR DEPRESSION WITHOUT PSYCHOTIC FEATURES (HCC): Status: RESOLVED | Noted: 2018-12-06 | Resolved: 2018-12-08

## 2018-12-08 PROBLEM — F33.2 SEVERE EPISODE OF RECURRENT MAJOR DEPRESSIVE DISORDER, WITHOUT PSYCHOTIC FEATURES (HCC): Status: RESOLVED | Noted: 2018-05-11 | Resolved: 2018-12-08

## 2018-12-08 LAB
CULTURE: ABNORMAL
Lab: ABNORMAL
ORGANISM: ABNORMAL
SPECIMEN DESCRIPTION: ABNORMAL
STATUS: ABNORMAL

## 2018-12-08 PROCEDURE — 6360000002 HC RX W HCPCS: Performed by: PSYCHIATRY & NEUROLOGY

## 2018-12-08 PROCEDURE — 99232 SBSQ HOSP IP/OBS MODERATE 35: CPT | Performed by: PSYCHIATRY & NEUROLOGY

## 2018-12-08 PROCEDURE — 6370000000 HC RX 637 (ALT 250 FOR IP): Performed by: PSYCHIATRY & NEUROLOGY

## 2018-12-08 PROCEDURE — 1240000000 HC EMOTIONAL WELLNESS R&B

## 2018-12-08 RX ORDER — HYDROXYZINE HYDROCHLORIDE 50 MG/ML
50 INJECTION, SOLUTION INTRAMUSCULAR EVERY 6 HOURS PRN
Status: DISCONTINUED | OUTPATIENT
Start: 2018-12-08 | End: 2018-12-10 | Stop reason: HOSPADM

## 2018-12-08 RX ADMIN — LURASIDONE HYDROCHLORIDE 60 MG: 60 TABLET, FILM COATED ORAL at 17:35

## 2018-12-08 RX ADMIN — BUPRENORPHINE AND NALOXONE 1 TABLET: 8; 2 TABLET SUBLINGUAL at 20:58

## 2018-12-08 RX ADMIN — HYDROXYZINE HYDROCHLORIDE 50 MG: 50 TABLET, FILM COATED ORAL at 20:58

## 2018-12-08 RX ADMIN — VILAZODONE HYDROCHLORIDE 10 MG: 10 TABLET ORAL at 08:29

## 2018-12-08 RX ADMIN — BUPRENORPHINE AND NALOXONE 1 TABLET: 8; 2 TABLET SUBLINGUAL at 08:29

## 2018-12-08 ASSESSMENT — PAIN SCALES - GENERAL
PAINLEVEL_OUTOF10: 5
PAINLEVEL_OUTOF10: 6
PAINLEVEL_OUTOF10: 1
PAINLEVEL_OUTOF10: 3

## 2018-12-08 NOTE — PROGRESS NOTES
Department of Psychiatry  Attending Progress Note  Chief Complaint: Bipolar I disorder, most recent episode depressed ([address]     SUBJECTIVE: Patient reports that mood is improving gradually but still has periods of the day during which she has been feeling depressed, overwhelmed, feeling lack of motivation and having thoughts of suicide by overdose. Patient continues to complain of racing thoughts driving feelings of anxiety. Denies side effects to medications. Reports feeling hopeless at times about situation and cannot contract for safety outside of hospital setting. Explored her feelings and concerns. Reassurance and support provided. Charting and medications reviewed. There is no identifiable safe alternative other than continued hospitalization.  Plan is to titrate Latuda for effect     OBJECTIVE    Physical  /68   Pulse 67   Temp 97.8 °F (36.6 °C) (Oral)   Resp 14   Ht 5' 4\" (1.626 m)   Wt 110 lb (49.9 kg)   LMP  (LMP Unknown)   SpO2 100%   BMI 18.88 kg/m²      Mental Status Evaluation:  Orientation: alertness: lethargic   Mood:. constricted and depressed      Affect:  constricted      Appearance:  disheveled   Activity:  Within Normal Limits   Gait/Posture: Normal   Speech:  normal pitch and normal volume   Thought Process:  concrete   Thought Content:  suicidal   Sensorium:  person, place and time/date   Cognition:  grossly intact   Memory: intact   Insight:  fair   Judgment: fair   Suicidal Ideations: with plan to OD    Homicidal Ideations: Negative for homicidal ideation      Medication Side Effects: absent       Attention Span attention span and concentration were age appropriate     Medications  Current Facility-Administered Medications   Medication Dose Route Frequency Provider Last Rate Last Dose    lurasidone (LATUDA) tablet 60 mg  60 mg Oral Dinner Melanie Chauhan MD        buprenorphine-naloxone (SUBOXONE) 8-2 MG SL tablet 1 tablet  1 tablet Sublingual BID Fareed Fraire MD   1

## 2018-12-09 PROCEDURE — 6360000002 HC RX W HCPCS: Performed by: PSYCHIATRY & NEUROLOGY

## 2018-12-09 PROCEDURE — 6370000000 HC RX 637 (ALT 250 FOR IP): Performed by: PSYCHIATRY & NEUROLOGY

## 2018-12-09 PROCEDURE — 1240000000 HC EMOTIONAL WELLNESS R&B

## 2018-12-09 RX ADMIN — HYDROXYZINE HYDROCHLORIDE 50 MG: 50 INJECTION, SOLUTION INTRAMUSCULAR at 20:35

## 2018-12-09 RX ADMIN — BUPRENORPHINE AND NALOXONE 1 TABLET: 8; 2 TABLET SUBLINGUAL at 09:33

## 2018-12-09 RX ADMIN — ALUMINUM HYDROXIDE, MAGNESIUM HYDROXIDE, AND SIMETHICONE 30 ML: 200; 200; 20 SUSPENSION ORAL at 18:32

## 2018-12-09 RX ADMIN — LURASIDONE HYDROCHLORIDE 60 MG: 60 TABLET, FILM COATED ORAL at 17:00

## 2018-12-09 RX ADMIN — ACETAMINOPHEN 650 MG: 325 TABLET, FILM COATED ORAL at 18:30

## 2018-12-09 RX ADMIN — BUPRENORPHINE AND NALOXONE 1 TABLET: 8; 2 TABLET SUBLINGUAL at 21:38

## 2018-12-09 ASSESSMENT — PAIN SCALES - GENERAL
PAINLEVEL_OUTOF10: 3
PAINLEVEL_OUTOF10: 5
PAINLEVEL_OUTOF10: 1
PAINLEVEL_OUTOF10: 0
PAINLEVEL_OUTOF10: 0
PAINLEVEL_OUTOF10: 1

## 2018-12-09 NOTE — PLAN OF CARE
Problem: Altered Mood, Depressive Behavior:  Goal: Able to verbalize and/or display a decrease in depressive symptoms  Able to verbalize and/or display a decrease in depressive symptoms   Outcome: Ongoing  1:1 with pt x ten minutes. Pt encouraged to attend unit programming and interact with peers and staff. Pt also encouraged to tend to hygiene and ADLs. Pt encouraged to discuss feelings with staff and feedback and reassurance provided. Pt denies thoughts of self harm and is agreeable to seeking out should thoughts of self harm arise. Safe environment maintained. Q15 minute checks for safety cont per unit policy. Will cont to monitor for safety and provides support and reassurance as needed. Pt is seclusive to room for long intervals. Unmotivated and lacks insight into illness. Pt is refusing groups. Attending to own ADL's. Social in dayroom at times. Appetite is improving and withdrawal symptoms are subsiding.
Problem: Altered Mood, Depressive Behavior:  Goal: Able to verbalize and/or display a decrease in depressive symptoms  Able to verbalize and/or display a decrease in depressive symptoms   Outcome: Ongoing  Pt did not participate in community meeting/ goals group at 0900 despite staff encouragement.
Problem: Altered Mood, Depressive Behavior:  Goal: Able to verbalize and/or display a decrease in depressive symptoms  Able to verbalize and/or display a decrease in depressive symptoms   Outcome: Ongoing  Pt reports depression and anxiety 6/10. Denies suicidal and homicidal ideation, denies any hallucinations. Pt reports poor sleep but no appetite issues. Out in the day area, social with peers, attends groups. Pleasant and cooperative, med compliant, no behavior issues. Encouraged to discuss feelings with staff and continue to attend unit group programming. Provided feedback and reassurance as needed. Goal: Absence of self-harm  Absence of self-harm   Outcome: Ongoing  Pt remains free of harm. Safe environment maintained. Q15 minute checks for safety continued per unit policy. Will continue to monitor for safety and provide support and reassurance as needed.
Person, Megan Brotherser to Time, Downey to Place, Downey to Situation  Attention:Normal: No  Attention: Distractible  Thought Processes: Circumstantial  Thought Content:Normal: Yes  Hallucinations: Visual (Comment) (Pt states she is seeing bugs crawl on her)  Delusions: No  Memory:Normal: No  Memory: Poor Recent  Insight and Judgment: No  Insight and Judgment: Poor Judgment, Poor Insight, Unrealistic  Present Suicidal Ideation: Yes  Present Homicidal Ideation: No    EDUCATION:   Learner Progress Toward Treatment Goals: reviewed group plans and strategies for care    Method:group therapy, medication compliance, individualized assessments and care planning    Outcome: needs reinforcement    PATIENT GOALS: to be discussed with patient within 72 hours    PLAN/TREATMENT RECOMMENDATIONS:     continue group therapy , medications compliance, goal setting, individualized assessments and care, continue to monitor pt on unit      SHORT-TERM GOALS:   Time frame for Short-Term Goals: 5-7 days    LONG-TERM GOALS:  Time frame for Long-Term Goals: 6 months  Members Present in Team Meeting: See Signature Sheet    CHANO Gonzales  Goal: Absence of self-harm  Absence of self-harm   Outcome: Ongoing

## 2018-12-10 VITALS
SYSTOLIC BLOOD PRESSURE: 93 MMHG | OXYGEN SATURATION: 100 % | HEIGHT: 64 IN | RESPIRATION RATE: 14 BRPM | BODY MASS INDEX: 18.78 KG/M2 | HEART RATE: 79 BPM | WEIGHT: 110 LBS | DIASTOLIC BLOOD PRESSURE: 43 MMHG | TEMPERATURE: 97.7 F

## 2018-12-10 PROCEDURE — 6360000002 HC RX W HCPCS: Performed by: PSYCHIATRY & NEUROLOGY

## 2018-12-10 PROCEDURE — 99239 HOSP IP/OBS DSCHRG MGMT >30: CPT | Performed by: PSYCHIATRY & NEUROLOGY

## 2018-12-10 PROCEDURE — 5130000000 HC BRIDGE APPOINTMENT

## 2018-12-10 PROCEDURE — 6370000000 HC RX 637 (ALT 250 FOR IP): Performed by: PSYCHIATRY & NEUROLOGY

## 2018-12-10 RX ORDER — BUPRENORPHINE HYDROCHLORIDE AND NALOXONE HYDROCHLORIDE DIHYDRATE 8; 2 MG/1; MG/1
1 TABLET SUBLINGUAL 2 TIMES DAILY
Qty: 10 TABLET | Refills: 0 | Status: SHIPPED | OUTPATIENT
Start: 2018-12-10 | End: 2019-01-09

## 2018-12-10 RX ORDER — HYDROXYZINE 50 MG/1
25 TABLET, FILM COATED ORAL 3 TIMES DAILY PRN
Qty: 90 TABLET | Refills: 0 | Status: SHIPPED | OUTPATIENT
Start: 2018-12-10 | End: 2019-01-09

## 2018-12-10 RX ADMIN — LURASIDONE HYDROCHLORIDE 60 MG: 60 TABLET, FILM COATED ORAL at 16:41

## 2018-12-10 RX ADMIN — HYDROXYZINE HYDROCHLORIDE 50 MG: 50 TABLET, FILM COATED ORAL at 16:41

## 2018-12-10 RX ADMIN — BUPRENORPHINE AND NALOXONE 1 TABLET: 8; 2 TABLET SUBLINGUAL at 09:22

## 2018-12-10 ASSESSMENT — PAIN SCALES - GENERAL
PAINLEVEL_OUTOF10: 0
PAINLEVEL_OUTOF10: 0

## 2018-12-10 NOTE — CARE COORDINATION
PSYCHOTHERAPY GROUP NOTE       Date:  12/10/18               Start Time:    11:00am                  End Time: 11: 45 am      Number Participants in Group: 5/12      Goals: Pt will demonstrate increased interpersonal interactions.         RT x SW  Nsg  LPN   BHTII  Other       Participation Level:     None  Minimal    Active Listener x Interactive    Monopolizing         Participation Quality:  x Appropriate  Inappropriate     Attentive   Intrusive     Sharing   Resistant     Supportive    Lethargic       Affective:   x Congruent  Incongruent  Blunted  Flat    Constricted  Anxious  Elated  Angry    Labile  Depressed  Other         Cognitive:  x Alert x Oriented PPTS     Concentration G  F X P    Attention Span G  F X P    Short-Term Memory G  F X P    Long-Term Memory G  F X P    ProblemSolving/  Decision Making G  F X P    Ability to Process  Information G  F x P       Contributing Factors             Delusional             Hallucinating             Flight of Ideas             Other: poor concentration       Modes of Intervention:  x Education x Support x Exploration    Clarifying x Problem Solving  Confrontation   x Socialization  Limit Setting  Reality Testing    Activity  Movement  Media    Other:          Response to Learning:  x Able to verbalize current knowledge/experience    Able to verbalize/acknowledge new learning    Able to retain information    Capable of insight    Able to change behavior   x Progressing to goal    Other:

## 2018-12-10 NOTE — DISCHARGE SUMMARY
labs and diagnostics    Treatments: Psychotropic medications, therapy with group, milieu, and 1:1 with nurses, social workers, PAJEREMY/CNP, and Attending physician. Discharge Medications:  Current Discharge Medication List      START taking these medications    Details   buprenorphine-naloxone (SUBOXONE) 8-2 MG SUBL SL tablet Place 1 tablet under the tongue 2 times daily for 30 days. Gómez Rower: 10 tablet, Refills: 0    Comments: [id number]  Associated Diagnoses: Opioid dependence with withdrawal (HCC)      hydrOXYzine (ATARAX) 50 MG tablet Take 0.5 tablets by mouth 3 times daily as needed for Anxiety  Qty: 90 tablet, Refills: 0      lurasidone (LATUDA) 60 MG TABS tablet Take 1 tablet by mouth Daily with supper  Qty: 30 tablet, Refills: 0         STOP taking these medications       FLUoxetine (PROZAC) 40 MG capsule Comments:   Reason for Stopping:         traZODone (DESYREL) 50 MG tablet Comments:   Reason for Stopping:         gabapentin (NEURONTIN) 100 MG capsule Comments:   Reason for Stopping:         ibuprofen (ADVIL;MOTRIN) 800 MG tablet Comments:   Reason for Stopping:         hydrocortisone (ANUSOL-HC) 2.5 % rectal cream Comments:   Reason for Stopping:                Discharge Exam:  Level of consciousness:  Within normal limits  Appearance: Street clothes, seated, with good grooming  Behavior/Motor: No abnormalities noted  Attitude toward examiner:  Cooperative, attentive, good eye contact  Speech:  spontaneous, normal rate, normal volume and well articulated  Mood:  euthymic  Affect:  mood congruent  Thought processes:  linear, goal directed and coherent  Thought content:  Homocidal ideation denies  Suicidal Ideation:  denies suicidal ideation  Delusions:  no evidence of delusions  Perceptual Disturbance:  denies any perceptual disturbance  Cognition:  In tact  Memory: age appropriate  Insight & Judgement: fair  Medication side effects:  denies     Disposition: home    Patient Instructions:    Activity:

## 2018-12-10 NOTE — DISCHARGE INSTR - DIET

## 2018-12-11 ENCOUNTER — HOSPITAL ENCOUNTER (OUTPATIENT)
Age: 28
Discharge: HOME | End: 2018-12-11
Payer: MEDICARE

## 2018-12-11 PROCEDURE — 80061 LIPID PANEL: CPT

## 2018-12-11 PROCEDURE — 84443 ASSAY THYROID STIM HORMONE: CPT

## 2018-12-11 PROCEDURE — 84481 FREE ASSAY (FT-3): CPT

## 2018-12-11 PROCEDURE — 85025 COMPLETE CBC W/AUTO DIFF WBC: CPT

## 2018-12-11 PROCEDURE — 84439 ASSAY OF FREE THYROXINE: CPT

## 2018-12-11 PROCEDURE — 84703 CHORIONIC GONADOTROPIN ASSAY: CPT

## 2018-12-11 PROCEDURE — 80074 ACUTE HEPATITIS PANEL: CPT

## 2018-12-11 PROCEDURE — 80053 COMPREHEN METABOLIC PANEL: CPT

## 2018-12-11 PROCEDURE — 87389 HIV-1 AG W/HIV-1&-2 AB AG IA: CPT

## 2018-12-11 NOTE — CARE COORDINATION
Name: Milton Gomez    : 1990    Discharge Date: 12/10/18    Primary Auth/Cert #: [id number]    Discharged to home   Discharge Medications:      Medication List      START taking these medications    buprenorphine-naloxone 8-2 MG Subl SL tablet  Commonly known as:  SUBOXONE  Place 1 tablet under the tongue 2 times daily for 30 days. .  Notes to patient:  Withdraw management     hydrOXYzine 50 MG tablet  Commonly known as:  ATARAX  Take 0.5 tablets by mouth 3 times daily as needed for Anxiety  Notes to patient:  anxiety     lurasidone 60 MG Tabs tablet  Commonly known as:  LATUDA  Take 1 tablet by mouth Daily with supper  Notes to patient:  Depression           Where to Get Your Medications      These medications were sent to [facility], [address] [address]    Phone:  [phone]   · hydrOXYzine 50 MG tablet  · lurasidone 60 MG Tabs tablet     You can get these medications from any pharmacy    Bring a paper prescription for each of these medications  · buprenorphine-naloxone 8-2 MG Subl SL tablet         Follow Up Appointment: [address], [address] [place] [address]  Main Number: [phone]  Detox Unit: [phone]  Fax D/C paper work to medical records: [phone]    On 2018  @10:30 for MAT assessment     Tanna  Fax D/C paper work to medical records: [phone]            [facility] [address] [facility].   [address]  [address]  [phone]    Go on 2018  Med continuation with Zuleima Gutierres @ 10:45 am

## 2018-12-12 ENCOUNTER — HOSPITAL ENCOUNTER (OUTPATIENT)
Age: 28
Discharge: HOME | End: 2018-12-12
Payer: MEDICARE

## 2018-12-12 LAB
ABSOLUTE EOS #: 0.1 K/UL (ref 0–0.44)
ABSOLUTE IMMATURE GRANULOCYTE: <0.03 K/UL (ref 0–0.3)
ABSOLUTE LYMPH #: 2.32 K/UL (ref 1.1–3.7)
ABSOLUTE MONO #: 0.32 K/UL (ref 0.1–1.2)
ALBUMIN SERPL-MCNC: 5 G/DL (ref 3.5–5.2)
ALBUMIN/GLOBULIN RATIO: 1.4 (ref 1–2.5)
ALP BLD-CCNC: 97 U/L (ref 35–104)
ALT SERPL-CCNC: 43 U/L (ref 5–33)
ANION GAP SERPL CALCULATED.3IONS-SCNC: 16 MMOL/L (ref 9–17)
AST SERPL-CCNC: 24 U/L
BASOPHILS # BLD: 1 % (ref 0–2)
BASOPHILS ABSOLUTE: 0.04 K/UL (ref 0–0.2)
BILIRUB SERPL-MCNC: 0.47 MG/DL (ref 0.3–1.2)
BUN BLDV-MCNC: 11 MG/DL (ref 6–20)
BUN/CREAT BLD: ABNORMAL (ref 9–20)
CALCIUM SERPL-MCNC: 10.1 MG/DL (ref 8.6–10.4)
CHLORIDE BLD-SCNC: 100 MMOL/L (ref 98–107)
CHOLESTEROL/HDL RATIO: 3.5
CHOLESTEROL: 225 MG/DL
CO2: 27 MMOL/L (ref 20–31)
CREAT SERPL-MCNC: 0.75 MG/DL (ref 0.5–0.9)
DIFFERENTIAL TYPE: ABNORMAL
EOSINOPHILS RELATIVE PERCENT: 1 % (ref 1–4)
GFR AFRICAN AMERICAN: >60 ML/MIN
GFR NON-AFRICAN AMERICAN: >60 ML/MIN
GFR SERPL CREATININE-BSD FRML MDRD: ABNORMAL ML/MIN/{1.73_M2}
GFR SERPL CREATININE-BSD FRML MDRD: ABNORMAL ML/MIN/{1.73_M2}
GLUCOSE BLD-MCNC: 84 MG/DL (ref 70–99)
HAV IGM SER IA-ACNC: NONREACTIVE
HCG QUALITATIVE: NEGATIVE
HCT VFR BLD CALC: 46.8 % (ref 36.3–47.1)
HDLC SERPL-MCNC: 64 MG/DL
HEMOGLOBIN: 15.8 G/DL (ref 11.9–15.1)
HEPATITIS B CORE IGM ANTIBODY: NONREACTIVE
HEPATITIS B SURFACE ANTIGEN: NONREACTIVE
HEPATITIS C ANTIBODY: REACTIVE
IMMATURE GRANULOCYTES: 0 %
LDL CHOLESTEROL: 123 MG/DL (ref 0–130)
LYMPHOCYTES # BLD: 33 % (ref 24–43)
MCH RBC QN AUTO: 30.2 PG (ref 25.2–33.5)
MCHC RBC AUTO-ENTMCNC: 33.8 G/DL (ref 28.4–34.8)
MCV RBC AUTO: 89.3 FL (ref 82.6–102.9)
MONOCYTES # BLD: 5 % (ref 3–12)
NRBC AUTOMATED: 0 PER 100 WBC
PDW BLD-RTO: 12.8 % (ref 11.8–14.4)
PLATELET # BLD: 172 K/UL (ref 138–453)
PLATELET ESTIMATE: ABNORMAL
PMV BLD AUTO: 10.8 FL (ref 8.1–13.5)
POTASSIUM SERPL-SCNC: 4.1 MMOL/L (ref 3.7–5.3)
RBC # BLD: 5.24 M/UL (ref 3.95–5.11)
RBC # BLD: ABNORMAL 10*6/UL
SEG NEUTROPHILS: 60 % (ref 36–65)
SEGMENTED NEUTROPHILS ABSOLUTE COUNT: 4.28 K/UL (ref 1.5–8.1)
SODIUM BLD-SCNC: 143 MMOL/L (ref 135–144)
T3 FREE: 4.52 PG/ML (ref 2.02–4.43)
THYROXINE, FREE: 1.81 NG/DL (ref 0.93–1.7)
TOTAL PROTEIN: 8.5 G/DL (ref 6.4–8.3)
TRIGL SERPL-MCNC: 189 MG/DL
TSH SERPL DL<=0.05 MIU/L-ACNC: 1.01 MIU/L (ref 0.3–5)
VLDLC SERPL CALC-MCNC: ABNORMAL MG/DL (ref 1–30)
WBC # BLD: 7.1 K/UL (ref 3.5–11.3)
WBC # BLD: ABNORMAL 10*3/UL

## 2018-12-12 PROCEDURE — 80074 ACUTE HEPATITIS PANEL: CPT

## 2018-12-12 PROCEDURE — 80061 LIPID PANEL: CPT

## 2018-12-12 PROCEDURE — 87389 HIV-1 AG W/HIV-1&-2 AB AG IA: CPT

## 2018-12-12 PROCEDURE — 36415 COLL VENOUS BLD VENIPUNCTURE: CPT

## 2018-12-12 PROCEDURE — 80053 COMPREHEN METABOLIC PANEL: CPT

## 2018-12-12 PROCEDURE — 84703 CHORIONIC GONADOTROPIN ASSAY: CPT

## 2018-12-12 PROCEDURE — 84439 ASSAY OF FREE THYROXINE: CPT

## 2018-12-12 PROCEDURE — 84443 ASSAY THYROID STIM HORMONE: CPT

## 2018-12-12 PROCEDURE — 85025 COMPLETE CBC W/AUTO DIFF WBC: CPT

## 2018-12-12 PROCEDURE — 84481 FREE ASSAY (FT-3): CPT

## 2018-12-13 ENCOUNTER — APPOINTMENT (OUTPATIENT)
Dept: GENERAL RADIOLOGY | Age: 28
End: 2018-12-13
Payer: MEDICARE

## 2018-12-13 ENCOUNTER — HOSPITAL ENCOUNTER (EMERGENCY)
Age: 28
Discharge: HOME | End: 2018-12-13
Attending: EMERGENCY MEDICINE
Payer: MEDICARE

## 2018-12-13 VITALS
SYSTOLIC BLOOD PRESSURE: 109 MMHG | TEMPERATURE: 97.9 F | HEART RATE: 112 BPM | HEIGHT: 64 IN | WEIGHT: 126 LBS | OXYGEN SATURATION: 100 % | RESPIRATION RATE: 16 BRPM | DIASTOLIC BLOOD PRESSURE: 74 MMHG | BODY MASS INDEX: 21.51 KG/M2

## 2018-12-13 DIAGNOSIS — L73.9 FOLLICULITIS: Primary | ICD-10-CM

## 2018-12-13 LAB
ALBUMIN SERPL-MCNC: 4.8 G/DL (ref 3.5–5.2)
ALBUMIN/GLOBULIN RATIO: NORMAL (ref 1–2.5)
ALP BLD-CCNC: 88 U/L (ref 35–104)
ALT SERPL-CCNC: 31 U/L (ref 5–33)
AMPHETAMINE SCREEN URINE: NEGATIVE
ANION GAP SERPL CALCULATED.3IONS-SCNC: 13 MMOL/L (ref 9–17)
AST SERPL-CCNC: 17 U/L
BARBITURATE SCREEN URINE: NEGATIVE
BENZODIAZEPINE SCREEN, URINE: NEGATIVE
BILIRUB SERPL-MCNC: 0.38 MG/DL (ref 0.3–1.2)
BILIRUBIN URINE: NEGATIVE
BUN BLDV-MCNC: 20 MG/DL (ref 6–20)
BUN/CREAT BLD: NORMAL (ref 9–20)
BUPRENORPHINE URINE: ABNORMAL
CALCIUM SERPL-MCNC: 9.8 MG/DL (ref 8.6–10.4)
CANNABINOID SCREEN URINE: NEGATIVE
CHLORIDE BLD-SCNC: 101 MMOL/L (ref 98–107)
CO2: 26 MMOL/L (ref 20–31)
COCAINE METABOLITE, URINE: POSITIVE
COLOR: YELLOW
COMMENT UA: NORMAL
CREAT SERPL-MCNC: 0.8 MG/DL (ref 0.5–0.9)
ETHANOL PERCENT: <0.01 %
ETHANOL: <10 MG/DL
GFR AFRICAN AMERICAN: >60 ML/MIN
GFR NON-AFRICAN AMERICAN: >60 ML/MIN
GFR SERPL CREATININE-BSD FRML MDRD: NORMAL ML/MIN/{1.73_M2}
GFR SERPL CREATININE-BSD FRML MDRD: NORMAL ML/MIN/{1.73_M2}
GLUCOSE BLD-MCNC: 80 MG/DL (ref 70–99)
GLUCOSE URINE: NEGATIVE
HCG QUALITATIVE: NEGATIVE
HIV AG/AB: NONREACTIVE
KETONES, URINE: NEGATIVE
LEUKOCYTE ESTERASE, URINE: NEGATIVE
LIPASE: 42 U/L (ref 13–60)
MDMA URINE: ABNORMAL
METHADONE SCREEN, URINE: NEGATIVE
METHAMPHETAMINE, URINE: ABNORMAL
NITRITE, URINE: NEGATIVE
OPIATES, URINE: NEGATIVE
OXYCODONE SCREEN URINE: NEGATIVE
PH UA: 6.5 (ref 5–8)
PHENCYCLIDINE, URINE: NEGATIVE
POTASSIUM SERPL-SCNC: 4.6 MMOL/L (ref 3.7–5.3)
PROPOXYPHENE, URINE: ABNORMAL
PROTEIN UA: NEGATIVE
SODIUM BLD-SCNC: 140 MMOL/L (ref 135–144)
SPECIFIC GRAVITY UA: 1.02 (ref 1–1.03)
TEST INFORMATION: ABNORMAL
TOTAL PROTEIN: 7.9 G/DL (ref 6.4–8.3)
TRICYCLIC ANTIDEPRESSANTS, UR: ABNORMAL
TURBIDITY: CLEAR
URINE HGB: NEGATIVE
UROBILINOGEN, URINE: NORMAL

## 2018-12-13 PROCEDURE — 84703 CHORIONIC GONADOTROPIN ASSAY: CPT

## 2018-12-13 PROCEDURE — 6370000000 HC RX 637 (ALT 250 FOR IP): Performed by: EMERGENCY MEDICINE

## 2018-12-13 PROCEDURE — 80307 DRUG TEST PRSMV CHEM ANLYZR: CPT

## 2018-12-13 PROCEDURE — 74022 RADEX COMPL AQT ABD SERIES: CPT

## 2018-12-13 PROCEDURE — 99283 EMERGENCY DEPT VISIT LOW MDM: CPT

## 2018-12-13 PROCEDURE — 80053 COMPREHEN METABOLIC PANEL: CPT

## 2018-12-13 PROCEDURE — 81003 URINALYSIS AUTO W/O SCOPE: CPT

## 2018-12-13 PROCEDURE — 36415 COLL VENOUS BLD VENIPUNCTURE: CPT

## 2018-12-13 PROCEDURE — 83690 ASSAY OF LIPASE: CPT

## 2018-12-13 PROCEDURE — G0480 DRUG TEST DEF 1-7 CLASSES: HCPCS

## 2018-12-13 RX ORDER — ONDANSETRON 4 MG/1
4 TABLET, ORALLY DISINTEGRATING ORAL ONCE
Status: DISCONTINUED | OUTPATIENT
Start: 2018-12-13 | End: 2018-12-13 | Stop reason: HOSPADM

## 2018-12-13 RX ORDER — 0.9 % SODIUM CHLORIDE 0.9 %
1000 INTRAVENOUS SOLUTION INTRAVENOUS ONCE
Status: DISCONTINUED | OUTPATIENT
Start: 2018-12-13 | End: 2018-12-13 | Stop reason: HOSPADM

## 2018-12-13 RX ORDER — DIPHENHYDRAMINE HCL 25 MG
25 TABLET ORAL EVERY 6 HOURS PRN
Status: DISCONTINUED | OUTPATIENT
Start: 2018-12-13 | End: 2018-12-13 | Stop reason: HOSPADM

## 2018-12-13 RX ORDER — SULFAMETHOXAZOLE AND TRIMETHOPRIM 800; 160 MG/1; MG/1
1 TABLET ORAL 2 TIMES DAILY
Qty: 14 TABLET | Refills: 0 | Status: SHIPPED | OUTPATIENT
Start: 2018-12-13 | End: 2018-12-20

## 2018-12-13 RX ORDER — LORAZEPAM 2 MG/ML
1 INJECTION INTRAMUSCULAR ONCE
Status: DISCONTINUED | OUTPATIENT
Start: 2018-12-13 | End: 2018-12-13 | Stop reason: HOSPADM

## 2018-12-13 RX ADMIN — DIPHENHYDRAMINE HCL 25 MG: 25 TABLET ORAL at 13:34

## 2018-12-13 NOTE — ED NOTES
Spoke with Dr. Kandy Llamas about pt's IV. Pt requesting oral medications. Dr. Kandy Llamas verbal order for ODT zofran and water, hold ativan at this time.           Edgard Romero RN  12/13/18 [id number]

## 2019-02-13 ENCOUNTER — HOSPITAL ENCOUNTER (INPATIENT)
Age: 29
LOS: 7 days | Discharge: HOME | DRG: 753 | End: 2019-02-20
Attending: EMERGENCY MEDICINE | Admitting: PSYCHIATRY & NEUROLOGY
Payer: MEDICARE

## 2019-02-13 DIAGNOSIS — F11.23 OPIOID DEPENDENCE WITH WITHDRAWAL (HCC): ICD-10-CM

## 2019-02-13 DIAGNOSIS — Z20.2 STD EXPOSURE: ICD-10-CM

## 2019-02-13 DIAGNOSIS — F22 DELUSIONS OF PARASITOSIS (HCC): ICD-10-CM

## 2019-02-13 DIAGNOSIS — R45.851 SUICIDAL IDEATION: Primary | ICD-10-CM

## 2019-02-13 PROBLEM — F29 PSYCHOSIS (HCC): Status: ACTIVE | Noted: 2019-02-13

## 2019-02-13 PROBLEM — F29 PSYCHOSIS (HCC): Status: RESOLVED | Noted: 2019-02-13 | Resolved: 2019-02-13

## 2019-02-13 LAB
AMPHETAMINE SCREEN URINE: NEGATIVE
BARBITURATE SCREEN URINE: NEGATIVE
BENZODIAZEPINE SCREEN, URINE: NEGATIVE
BUPRENORPHINE URINE: ABNORMAL
CANNABINOID SCREEN URINE: NEGATIVE
COCAINE METABOLITE, URINE: POSITIVE
DIRECT EXAM: NORMAL
Lab: NORMAL
MDMA URINE: ABNORMAL
METHADONE SCREEN, URINE: NEGATIVE
METHAMPHETAMINE, URINE: ABNORMAL
OPIATES, URINE: NEGATIVE
OXYCODONE SCREEN URINE: NEGATIVE
PHENCYCLIDINE, URINE: NEGATIVE
PROPOXYPHENE, URINE: ABNORMAL
SPECIMEN DESCRIPTION: NORMAL
TEST INFORMATION: ABNORMAL
TRICYCLIC ANTIDEPRESSANTS, UR: ABNORMAL

## 2019-02-13 PROCEDURE — 96372 THER/PROPH/DIAG INJ SC/IM: CPT

## 2019-02-13 PROCEDURE — 6370000000 HC RX 637 (ALT 250 FOR IP): Performed by: PSYCHIATRY & NEUROLOGY

## 2019-02-13 PROCEDURE — 93005 ELECTROCARDIOGRAM TRACING: CPT

## 2019-02-13 PROCEDURE — 1240000000 HC EMOTIONAL WELLNESS R&B

## 2019-02-13 PROCEDURE — 80307 DRUG TEST PRSMV CHEM ANLYZR: CPT

## 2019-02-13 PROCEDURE — 87510 GARDNER VAG DNA DIR PROBE: CPT

## 2019-02-13 PROCEDURE — 6360000002 HC RX W HCPCS: Performed by: EMERGENCY MEDICINE

## 2019-02-13 PROCEDURE — 87491 CHLMYD TRACH DNA AMP PROBE: CPT

## 2019-02-13 PROCEDURE — 87591 N.GONORRHOEAE DNA AMP PROB: CPT

## 2019-02-13 PROCEDURE — 87480 CANDIDA DNA DIR PROBE: CPT

## 2019-02-13 PROCEDURE — 6370000000 HC RX 637 (ALT 250 FOR IP): Performed by: EMERGENCY MEDICINE

## 2019-02-13 PROCEDURE — 99285 EMERGENCY DEPT VISIT HI MDM: CPT

## 2019-02-13 PROCEDURE — 87660 TRICHOMONAS VAGIN DIR PROBE: CPT

## 2019-02-13 RX ORDER — DIPHENHYDRAMINE HCL 25 MG
25 TABLET ORAL EVERY 6 HOURS PRN
Status: DISCONTINUED | OUTPATIENT
Start: 2019-02-13 | End: 2019-02-20 | Stop reason: HOSPADM

## 2019-02-13 RX ORDER — MAGNESIUM HYDROXIDE/ALUMINUM HYDROXICE/SIMETHICONE 120; 1200; 1200 MG/30ML; MG/30ML; MG/30ML
30 SUSPENSION ORAL 3 TIMES DAILY PRN
Status: DISCONTINUED | OUTPATIENT
Start: 2019-02-13 | End: 2019-02-20 | Stop reason: HOSPADM

## 2019-02-13 RX ORDER — LOPERAMIDE HYDROCHLORIDE 2 MG/1
2 CAPSULE ORAL 2 TIMES DAILY PRN
Status: DISCONTINUED | OUTPATIENT
Start: 2019-02-13 | End: 2019-02-20 | Stop reason: HOSPADM

## 2019-02-13 RX ORDER — ACETAMINOPHEN 500 MG
500 TABLET ORAL EVERY 4 HOURS PRN
Status: DISCONTINUED | OUTPATIENT
Start: 2019-02-13 | End: 2019-02-20 | Stop reason: HOSPADM

## 2019-02-13 RX ORDER — IBUPROFEN 800 MG/1
800 TABLET ORAL 3 TIMES DAILY PRN
Status: DISCONTINUED | OUTPATIENT
Start: 2019-02-13 | End: 2019-02-20 | Stop reason: HOSPADM

## 2019-02-13 RX ORDER — AZITHROMYCIN 250 MG/1
1000 TABLET, FILM COATED ORAL ONCE
Status: COMPLETED | OUTPATIENT
Start: 2019-02-13 | End: 2019-02-13

## 2019-02-13 RX ORDER — CEFTRIAXONE 500 MG/1
250 INJECTION, POWDER, FOR SOLUTION INTRAMUSCULAR; INTRAVENOUS ONCE
Status: COMPLETED | OUTPATIENT
Start: 2019-02-13 | End: 2019-02-13

## 2019-02-13 RX ORDER — HYDROXYZINE HYDROCHLORIDE 25 MG/1
25 TABLET, FILM COATED ORAL 3 TIMES DAILY PRN
Status: DISCONTINUED | OUTPATIENT
Start: 2019-02-13 | End: 2019-02-20 | Stop reason: HOSPADM

## 2019-02-13 RX ORDER — TRAZODONE HYDROCHLORIDE 50 MG/1
50 TABLET ORAL NIGHTLY PRN
Status: DISCONTINUED | OUTPATIENT
Start: 2019-02-13 | End: 2019-02-20 | Stop reason: HOSPADM

## 2019-02-13 RX ADMIN — ACETAMINOPHEN 500 MG: 500 TABLET, FILM COATED ORAL at 17:45

## 2019-02-13 RX ADMIN — AZITHROMYCIN 1000 MG: 250 TABLET, FILM COATED ORAL at 05:47

## 2019-02-13 RX ADMIN — HYDROXYZINE HYDROCHLORIDE 25 MG: 25 TABLET, FILM COATED ORAL at 20:23

## 2019-02-13 RX ADMIN — IBUPROFEN 800 MG: 800 TABLET ORAL at 20:23

## 2019-02-13 RX ADMIN — TRAZODONE HYDROCHLORIDE 50 MG: 50 TABLET ORAL at 20:23

## 2019-02-13 RX ADMIN — VORTIOXETINE 10 MG: 10 TABLET, FILM COATED ORAL at 14:25

## 2019-02-13 RX ADMIN — BREXPIPRAZOLE 1 MG: 1 TABLET ORAL at 14:25

## 2019-02-13 RX ADMIN — CEFTRIAXONE SODIUM 250 MG: 500 INJECTION, POWDER, FOR SOLUTION INTRAMUSCULAR; INTRAVENOUS at 05:47

## 2019-02-13 RX ADMIN — IBUPROFEN 800 MG: 800 TABLET ORAL at 14:25

## 2019-02-13 RX ADMIN — HYDROXYZINE HYDROCHLORIDE 25 MG: 25 TABLET, FILM COATED ORAL at 14:25

## 2019-02-13 ASSESSMENT — ENCOUNTER SYMPTOMS
SHORTNESS OF BREATH: 0
SORE THROAT: 0
EYE REDNESS: 0
EYE PAIN: 0
VOMITING: 0
DIARRHEA: 0
CONSTIPATION: 0
NAUSEA: 0
BACK PAIN: 0
ABDOMINAL PAIN: 0
COUGH: 0
CHEST TIGHTNESS: 0

## 2019-02-13 ASSESSMENT — LIFESTYLE VARIABLES: HISTORY_ALCOHOL_USE: NO

## 2019-02-13 ASSESSMENT — PAIN SCALES - GENERAL
PAINLEVEL_OUTOF10: 2
PAINLEVEL_OUTOF10: 5
PAINLEVEL_OUTOF10: 6
PAINLEVEL_OUTOF10: 3
PAINLEVEL_OUTOF10: 5

## 2019-02-13 ASSESSMENT — SLEEP AND FATIGUE QUESTIONNAIRES
DIFFICULTY FALLING ASLEEP: YES
SLEEP PATTERN: DIFFICULTY FALLING ASLEEP;DISTURBED/INTERRUPTED SLEEP;RESTLESSNESS
DO YOU HAVE DIFFICULTY SLEEPING: YES
DO YOU USE A SLEEP AID: NO
DIFFICULTY ARISING: NO
RESTFUL SLEEP: NO
DIFFICULTY STAYING ASLEEP: YES

## 2019-02-13 ASSESSMENT — PAIN DESCRIPTION - LOCATION
LOCATION: BACK;ABDOMEN
LOCATION: ABDOMEN;BACK
LOCATION: BACK;ABDOMEN

## 2019-02-13 ASSESSMENT — PAIN DESCRIPTION - PAIN TYPE
TYPE: ACUTE PAIN

## 2019-02-13 ASSESSMENT — PATIENT HEALTH QUESTIONNAIRE - PHQ9: SUM OF ALL RESPONSES TO PHQ QUESTIONS 1-9: 27

## 2019-02-13 ASSESSMENT — PAIN DESCRIPTION - FREQUENCY
FREQUENCY: INTERMITTENT
FREQUENCY: INTERMITTENT

## 2019-02-14 PROBLEM — E43 SEVERE MALNUTRITION (HCC): Status: ACTIVE | Noted: 2019-02-14

## 2019-02-14 LAB
C TRACH DNA GENITAL QL NAA+PROBE: NEGATIVE
EKG ATRIAL RATE: 87 BPM
EKG P AXIS: 82 DEGREES
EKG P-R INTERVAL: 138 MS
EKG Q-T INTERVAL: 350 MS
EKG QRS DURATION: 74 MS
EKG QTC CALCULATION (BAZETT): 421 MS
EKG R AXIS: 80 DEGREES
EKG T AXIS: 79 DEGREES
EKG VENTRICULAR RATE: 87 BPM
N. GONORRHOEAE DNA: NEGATIVE
SPECIMEN DESCRIPTION: NORMAL

## 2019-02-14 PROCEDURE — 6370000000 HC RX 637 (ALT 250 FOR IP): Performed by: PSYCHIATRY & NEUROLOGY

## 2019-02-14 PROCEDURE — 1240000000 HC EMOTIONAL WELLNESS R&B

## 2019-02-14 PROCEDURE — 90792 PSYCH DIAG EVAL W/MED SRVCS: CPT | Performed by: PSYCHIATRY & NEUROLOGY

## 2019-02-14 PROCEDURE — 6360000002 HC RX W HCPCS: Performed by: PSYCHIATRY & NEUROLOGY

## 2019-02-14 RX ORDER — ONDANSETRON 4 MG/1
4 TABLET, FILM COATED ORAL EVERY 8 HOURS PRN
Status: DISCONTINUED | OUTPATIENT
Start: 2019-02-14 | End: 2019-02-20 | Stop reason: HOSPADM

## 2019-02-14 RX ORDER — BUPRENORPHINE AND NALOXONE 8; 2 MG/1; MG/1
1 FILM, SOLUBLE BUCCAL; SUBLINGUAL 2 TIMES DAILY
Status: DISCONTINUED | OUTPATIENT
Start: 2019-02-14 | End: 2019-02-14

## 2019-02-14 RX ORDER — BUPRENORPHINE AND NALOXONE 8; 2 MG/1; MG/1
1 FILM, SOLUBLE BUCCAL; SUBLINGUAL 2 TIMES DAILY
Status: DISCONTINUED | OUTPATIENT
Start: 2019-02-14 | End: 2019-02-20 | Stop reason: HOSPADM

## 2019-02-14 RX ADMIN — BUPRENORPHINE HYDROCHLORIDE, NALOXONE HYDROCHLORIDE 1 FILM: 8; 2 FILM, SOLUBLE BUCCAL; SUBLINGUAL at 10:38

## 2019-02-14 RX ADMIN — HYDROXYZINE HYDROCHLORIDE 25 MG: 25 TABLET, FILM COATED ORAL at 22:43

## 2019-02-14 RX ADMIN — BREXPIPRAZOLE 1 MG: 1 TABLET ORAL at 08:38

## 2019-02-14 RX ADMIN — BUPRENORPHINE HYDROCHLORIDE, NALOXONE HYDROCHLORIDE 1 FILM: 8; 2 FILM, SOLUBLE BUCCAL; SUBLINGUAL at 22:43

## 2019-02-14 RX ADMIN — TRAZODONE HYDROCHLORIDE 50 MG: 50 TABLET ORAL at 22:43

## 2019-02-14 RX ADMIN — VORTIOXETINE 10 MG: 10 TABLET, FILM COATED ORAL at 08:25

## 2019-02-14 ASSESSMENT — ENCOUNTER SYMPTOMS
EYE DISCHARGE: 0
COUGH: 0
WHEEZING: 0
DIARRHEA: 0
NAUSEA: 0
TROUBLE SWALLOWING: 0
BACK PAIN: 0
ABDOMINAL PAIN: 0
COLOR CHANGE: 0
SORE THROAT: 0
BLOOD IN STOOL: 0
CHEST TIGHTNESS: 0
CONSTIPATION: 0
SHORTNESS OF BREATH: 0
SINUS PAIN: 0
EYE REDNESS: 0
EYE PAIN: 0
VOMITING: 0

## 2019-02-14 ASSESSMENT — PAIN SCALES - GENERAL
PAINLEVEL_OUTOF10: 0

## 2019-02-14 ASSESSMENT — LIFESTYLE VARIABLES: HISTORY_ALCOHOL_USE: NO

## 2019-02-15 PROCEDURE — 99232 SBSQ HOSP IP/OBS MODERATE 35: CPT | Performed by: PSYCHIATRY & NEUROLOGY

## 2019-02-15 PROCEDURE — 6370000000 HC RX 637 (ALT 250 FOR IP): Performed by: PSYCHIATRY & NEUROLOGY

## 2019-02-15 PROCEDURE — 1240000000 HC EMOTIONAL WELLNESS R&B

## 2019-02-15 PROCEDURE — 6360000002 HC RX W HCPCS: Performed by: PSYCHIATRY & NEUROLOGY

## 2019-02-15 PROCEDURE — 6370000000 HC RX 637 (ALT 250 FOR IP): Performed by: NURSE PRACTITIONER

## 2019-02-15 RX ORDER — QUETIAPINE FUMARATE 50 MG/1
50 TABLET, FILM COATED ORAL 2 TIMES DAILY
Status: DISCONTINUED | OUTPATIENT
Start: 2019-02-15 | End: 2019-02-20 | Stop reason: HOSPADM

## 2019-02-15 RX ADMIN — TRAZODONE HYDROCHLORIDE 50 MG: 50 TABLET ORAL at 20:30

## 2019-02-15 RX ADMIN — HYDROXYZINE HYDROCHLORIDE 25 MG: 25 TABLET, FILM COATED ORAL at 20:30

## 2019-02-15 RX ADMIN — DIPHENHYDRAMINE HCL 25 MG: 25 TABLET ORAL at 20:30

## 2019-02-15 RX ADMIN — QUETIAPINE FUMARATE 50 MG: 50 TABLET ORAL at 20:30

## 2019-02-15 RX ADMIN — QUETIAPINE FUMARATE 50 MG: 50 TABLET ORAL at 13:30

## 2019-02-15 RX ADMIN — BUPRENORPHINE HYDROCHLORIDE, NALOXONE HYDROCHLORIDE 1 FILM: 8; 2 FILM, SOLUBLE BUCCAL; SUBLINGUAL at 20:30

## 2019-02-15 ASSESSMENT — PAIN SCALES - GENERAL: PAINLEVEL_OUTOF10: 0

## 2019-02-16 PROCEDURE — 1240000000 HC EMOTIONAL WELLNESS R&B

## 2019-02-16 PROCEDURE — 6370000000 HC RX 637 (ALT 250 FOR IP): Performed by: NURSE PRACTITIONER

## 2019-02-16 PROCEDURE — 99232 SBSQ HOSP IP/OBS MODERATE 35: CPT | Performed by: PSYCHIATRY & NEUROLOGY

## 2019-02-16 PROCEDURE — 6370000000 HC RX 637 (ALT 250 FOR IP): Performed by: PSYCHIATRY & NEUROLOGY

## 2019-02-16 PROCEDURE — 6360000002 HC RX W HCPCS: Performed by: PSYCHIATRY & NEUROLOGY

## 2019-02-16 RX ADMIN — TRAZODONE HYDROCHLORIDE 50 MG: 50 TABLET ORAL at 21:01

## 2019-02-16 RX ADMIN — QUETIAPINE FUMARATE 50 MG: 50 TABLET ORAL at 09:33

## 2019-02-16 RX ADMIN — DIPHENHYDRAMINE HCL 25 MG: 25 TABLET ORAL at 21:01

## 2019-02-16 RX ADMIN — HYDROXYZINE HYDROCHLORIDE 25 MG: 25 TABLET, FILM COATED ORAL at 17:39

## 2019-02-16 RX ADMIN — QUETIAPINE FUMARATE 50 MG: 50 TABLET ORAL at 21:01

## 2019-02-16 RX ADMIN — BUPRENORPHINE HYDROCHLORIDE, NALOXONE HYDROCHLORIDE 1 FILM: 8; 2 FILM, SOLUBLE BUCCAL; SUBLINGUAL at 21:01

## 2019-02-16 RX ADMIN — ONDANSETRON HYDROCHLORIDE 4 MG: 4 TABLET, FILM COATED ORAL at 17:36

## 2019-02-16 RX ADMIN — BUPRENORPHINE HYDROCHLORIDE, NALOXONE HYDROCHLORIDE 1 FILM: 8; 2 FILM, SOLUBLE BUCCAL; SUBLINGUAL at 09:31

## 2019-02-16 ASSESSMENT — PAIN SCALES - GENERAL
PAINLEVEL_OUTOF10: 0
PAINLEVEL_OUTOF10: 0

## 2019-02-17 PROCEDURE — 6370000000 HC RX 637 (ALT 250 FOR IP): Performed by: NURSE PRACTITIONER

## 2019-02-17 PROCEDURE — 1240000000 HC EMOTIONAL WELLNESS R&B

## 2019-02-17 PROCEDURE — 6360000002 HC RX W HCPCS: Performed by: PSYCHIATRY & NEUROLOGY

## 2019-02-17 PROCEDURE — 6370000000 HC RX 637 (ALT 250 FOR IP): Performed by: PSYCHIATRY & NEUROLOGY

## 2019-02-17 PROCEDURE — 99232 SBSQ HOSP IP/OBS MODERATE 35: CPT | Performed by: PSYCHIATRY & NEUROLOGY

## 2019-02-17 RX ADMIN — HYDROXYZINE HYDROCHLORIDE 25 MG: 25 TABLET, FILM COATED ORAL at 14:26

## 2019-02-17 RX ADMIN — HYDROXYZINE HYDROCHLORIDE 25 MG: 25 TABLET, FILM COATED ORAL at 22:37

## 2019-02-17 RX ADMIN — NICOTINE POLACRILEX 2 MG: 2 GUM, CHEWING BUCCAL at 14:26

## 2019-02-17 RX ADMIN — BUPRENORPHINE HYDROCHLORIDE, NALOXONE HYDROCHLORIDE 1 FILM: 8; 2 FILM, SOLUBLE BUCCAL; SUBLINGUAL at 20:50

## 2019-02-17 RX ADMIN — TRAZODONE HYDROCHLORIDE 50 MG: 50 TABLET ORAL at 22:36

## 2019-02-17 RX ADMIN — ONDANSETRON HYDROCHLORIDE 4 MG: 4 TABLET, FILM COATED ORAL at 14:26

## 2019-02-17 RX ADMIN — ONDANSETRON HYDROCHLORIDE 4 MG: 4 TABLET, FILM COATED ORAL at 22:36

## 2019-02-17 RX ADMIN — DIPHENHYDRAMINE HCL 25 MG: 25 TABLET ORAL at 22:37

## 2019-02-17 RX ADMIN — QUETIAPINE FUMARATE 50 MG: 50 TABLET ORAL at 08:34

## 2019-02-17 RX ADMIN — QUETIAPINE FUMARATE 50 MG: 50 TABLET ORAL at 22:36

## 2019-02-17 RX ADMIN — ONDANSETRON HYDROCHLORIDE 4 MG: 4 TABLET, FILM COATED ORAL at 04:32

## 2019-02-17 RX ADMIN — NICOTINE POLACRILEX 2 MG: 2 GUM, CHEWING BUCCAL at 18:46

## 2019-02-17 RX ADMIN — BUPRENORPHINE HYDROCHLORIDE, NALOXONE HYDROCHLORIDE 1 FILM: 8; 2 FILM, SOLUBLE BUCCAL; SUBLINGUAL at 08:34

## 2019-02-17 ASSESSMENT — PAIN SCALES - GENERAL
PAINLEVEL_OUTOF10: 0
PAINLEVEL_OUTOF10: 0

## 2019-02-18 PROCEDURE — 6370000000 HC RX 637 (ALT 250 FOR IP): Performed by: PSYCHIATRY & NEUROLOGY

## 2019-02-18 PROCEDURE — 1240000000 HC EMOTIONAL WELLNESS R&B

## 2019-02-18 PROCEDURE — 99232 SBSQ HOSP IP/OBS MODERATE 35: CPT | Performed by: PSYCHIATRY & NEUROLOGY

## 2019-02-18 PROCEDURE — 6370000000 HC RX 637 (ALT 250 FOR IP): Performed by: NURSE PRACTITIONER

## 2019-02-18 PROCEDURE — 6360000002 HC RX W HCPCS: Performed by: PSYCHIATRY & NEUROLOGY

## 2019-02-18 RX ADMIN — QUETIAPINE FUMARATE 50 MG: 50 TABLET ORAL at 08:48

## 2019-02-18 RX ADMIN — QUETIAPINE FUMARATE 50 MG: 50 TABLET ORAL at 21:47

## 2019-02-18 RX ADMIN — NICOTINE POLACRILEX 2 MG: 2 GUM, CHEWING BUCCAL at 19:45

## 2019-02-18 RX ADMIN — ONDANSETRON HYDROCHLORIDE 4 MG: 4 TABLET, FILM COATED ORAL at 08:48

## 2019-02-18 RX ADMIN — TRAZODONE HYDROCHLORIDE 50 MG: 50 TABLET ORAL at 21:47

## 2019-02-18 RX ADMIN — ONDANSETRON HYDROCHLORIDE 4 MG: 4 TABLET, FILM COATED ORAL at 19:45

## 2019-02-18 RX ADMIN — BUPRENORPHINE HYDROCHLORIDE, NALOXONE HYDROCHLORIDE 1 FILM: 8; 2 FILM, SOLUBLE BUCCAL; SUBLINGUAL at 08:48

## 2019-02-18 RX ADMIN — HYDROXYZINE HYDROCHLORIDE 25 MG: 25 TABLET, FILM COATED ORAL at 08:48

## 2019-02-18 RX ADMIN — DIPHENHYDRAMINE HCL 25 MG: 25 TABLET ORAL at 21:47

## 2019-02-18 RX ADMIN — BUPRENORPHINE HYDROCHLORIDE, NALOXONE HYDROCHLORIDE 1 FILM: 8; 2 FILM, SOLUBLE BUCCAL; SUBLINGUAL at 21:47

## 2019-02-18 RX ADMIN — NICOTINE POLACRILEX 2 MG: 2 GUM, CHEWING BUCCAL at 13:36

## 2019-02-18 RX ADMIN — HYDROXYZINE HYDROCHLORIDE 25 MG: 25 TABLET, FILM COATED ORAL at 19:45

## 2019-02-18 ASSESSMENT — PAIN SCALES - GENERAL
PAINLEVEL_OUTOF10: 0
PAINLEVEL_OUTOF10: 0

## 2019-02-19 PROCEDURE — 6360000002 HC RX W HCPCS: Performed by: PSYCHIATRY & NEUROLOGY

## 2019-02-19 PROCEDURE — 99232 SBSQ HOSP IP/OBS MODERATE 35: CPT | Performed by: REGISTERED NURSE

## 2019-02-19 PROCEDURE — 6370000000 HC RX 637 (ALT 250 FOR IP): Performed by: PSYCHIATRY & NEUROLOGY

## 2019-02-19 PROCEDURE — 6370000000 HC RX 637 (ALT 250 FOR IP): Performed by: NURSE PRACTITIONER

## 2019-02-19 PROCEDURE — 1240000000 HC EMOTIONAL WELLNESS R&B

## 2019-02-19 RX ADMIN — BUPRENORPHINE HYDROCHLORIDE, NALOXONE HYDROCHLORIDE 1 FILM: 8; 2 FILM, SOLUBLE BUCCAL; SUBLINGUAL at 21:25

## 2019-02-19 RX ADMIN — QUETIAPINE FUMARATE 50 MG: 50 TABLET ORAL at 09:02

## 2019-02-19 RX ADMIN — BUPRENORPHINE HYDROCHLORIDE, NALOXONE HYDROCHLORIDE 1 FILM: 8; 2 FILM, SOLUBLE BUCCAL; SUBLINGUAL at 09:02

## 2019-02-19 RX ADMIN — HYDROXYZINE HYDROCHLORIDE 25 MG: 25 TABLET, FILM COATED ORAL at 21:25

## 2019-02-19 RX ADMIN — HYDROXYZINE HYDROCHLORIDE 25 MG: 25 TABLET, FILM COATED ORAL at 09:30

## 2019-02-19 RX ADMIN — ONDANSETRON HYDROCHLORIDE 4 MG: 4 TABLET, FILM COATED ORAL at 09:30

## 2019-02-19 RX ADMIN — NICOTINE POLACRILEX 2 MG: 2 GUM, CHEWING BUCCAL at 16:14

## 2019-02-19 RX ADMIN — ONDANSETRON HYDROCHLORIDE 4 MG: 4 TABLET, FILM COATED ORAL at 18:18

## 2019-02-19 RX ADMIN — QUETIAPINE FUMARATE 50 MG: 50 TABLET ORAL at 21:25

## 2019-02-19 RX ADMIN — DIPHENHYDRAMINE HCL 25 MG: 25 TABLET ORAL at 21:25

## 2019-02-19 RX ADMIN — NICOTINE POLACRILEX 2 MG: 2 GUM, CHEWING BUCCAL at 17:15

## 2019-02-19 RX ADMIN — TRAZODONE HYDROCHLORIDE 50 MG: 50 TABLET ORAL at 21:25

## 2019-02-19 ASSESSMENT — PAIN SCALES - GENERAL
PAINLEVEL_OUTOF10: 0

## 2019-02-20 VITALS
HEART RATE: 87 BPM | TEMPERATURE: 97.9 F | SYSTOLIC BLOOD PRESSURE: 100 MMHG | WEIGHT: 115.6 LBS | DIASTOLIC BLOOD PRESSURE: 64 MMHG | HEIGHT: 64 IN | RESPIRATION RATE: 14 BRPM | OXYGEN SATURATION: 100 % | BODY MASS INDEX: 19.74 KG/M2

## 2019-02-20 PROCEDURE — 5130000000 HC BRIDGE APPOINTMENT: Performed by: COUNSELOR

## 2019-02-20 PROCEDURE — 6360000002 HC RX W HCPCS: Performed by: PSYCHIATRY & NEUROLOGY

## 2019-02-20 PROCEDURE — 6370000000 HC RX 637 (ALT 250 FOR IP): Performed by: NURSE PRACTITIONER

## 2019-02-20 PROCEDURE — 6370000000 HC RX 637 (ALT 250 FOR IP): Performed by: PSYCHIATRY & NEUROLOGY

## 2019-02-20 PROCEDURE — 99239 HOSP IP/OBS DSCHRG MGMT >30: CPT | Performed by: REGISTERED NURSE

## 2019-02-20 RX ORDER — BUPRENORPHINE AND NALOXONE 8; 2 MG/1; MG/1
1 FILM, SOLUBLE BUCCAL; SUBLINGUAL 2 TIMES DAILY
Qty: 8 FILM | Refills: 0 | Status: SHIPPED | OUTPATIENT
Start: 2019-02-20 | End: 2019-02-24

## 2019-02-20 RX ORDER — QUETIAPINE FUMARATE 50 MG/1
50 TABLET, FILM COATED ORAL 2 TIMES DAILY
Qty: 28 TABLET | Refills: 0 | Status: ON HOLD | OUTPATIENT
Start: 2019-02-20 | End: 2019-07-27 | Stop reason: HOSPADM

## 2019-02-20 RX ORDER — ONDANSETRON 4 MG/1
4 TABLET, FILM COATED ORAL EVERY 8 HOURS PRN
Qty: 4 TABLET | Refills: 0 | Status: SHIPPED | OUTPATIENT
Start: 2019-02-20 | End: 2019-07-20 | Stop reason: ALTCHOICE

## 2019-02-20 RX ORDER — HYDROXYZINE HYDROCHLORIDE 25 MG/1
25 TABLET, FILM COATED ORAL 3 TIMES DAILY PRN
Qty: 20 TABLET | Refills: 0 | Status: SHIPPED | OUTPATIENT
Start: 2019-02-20 | End: 2019-03-02

## 2019-02-20 RX ADMIN — HYDROXYZINE HYDROCHLORIDE 25 MG: 25 TABLET, FILM COATED ORAL at 09:08

## 2019-02-20 RX ADMIN — VORTIOXETINE 15 MG: 10 TABLET, FILM COATED ORAL at 09:03

## 2019-02-20 RX ADMIN — BUPRENORPHINE HYDROCHLORIDE, NALOXONE HYDROCHLORIDE 1 FILM: 8; 2 FILM, SOLUBLE BUCCAL; SUBLINGUAL at 09:02

## 2019-02-20 RX ADMIN — ONDANSETRON HYDROCHLORIDE 4 MG: 4 TABLET, FILM COATED ORAL at 09:08

## 2019-02-20 RX ADMIN — QUETIAPINE FUMARATE 50 MG: 50 TABLET ORAL at 09:03

## 2019-02-20 ASSESSMENT — PAIN SCALES - GENERAL
PAINLEVEL_OUTOF10: 0
PAINLEVEL_OUTOF10: 0

## 2019-07-07 ENCOUNTER — HOSPITAL ENCOUNTER (EMERGENCY)
Age: 29
Discharge: HOME | End: 2019-07-07
Attending: EMERGENCY MEDICINE
Payer: MEDICARE

## 2019-07-07 VITALS
SYSTOLIC BLOOD PRESSURE: 101 MMHG | TEMPERATURE: 97.6 F | OXYGEN SATURATION: 100 % | RESPIRATION RATE: 18 BRPM | DIASTOLIC BLOOD PRESSURE: 52 MMHG | HEART RATE: 86 BPM

## 2019-07-07 DIAGNOSIS — T50.901A ACCIDENTAL DRUG OVERDOSE, INITIAL ENCOUNTER: Primary | ICD-10-CM

## 2019-07-07 PROCEDURE — 6370000000 HC RX 637 (ALT 250 FOR IP): Performed by: STUDENT IN AN ORGANIZED HEALTH CARE EDUCATION/TRAINING PROGRAM

## 2019-07-07 PROCEDURE — 99283 EMERGENCY DEPT VISIT LOW MDM: CPT

## 2019-07-07 RX ORDER — ONDANSETRON 4 MG/1
4 TABLET, FILM COATED ORAL ONCE
Status: COMPLETED | OUTPATIENT
Start: 2019-07-07 | End: 2019-07-07

## 2019-07-07 RX ADMIN — ONDANSETRON HYDROCHLORIDE 4 MG: 4 TABLET, FILM COATED ORAL at 02:33

## 2019-07-07 ASSESSMENT — ENCOUNTER SYMPTOMS
SORE THROAT: 0
ABDOMINAL PAIN: 0
TROUBLE SWALLOWING: 0
PHOTOPHOBIA: 0
VOMITING: 0
NAUSEA: 0
SHORTNESS OF BREATH: 0
CHEST TIGHTNESS: 0
BACK PAIN: 0
COUGH: 0
WHEEZING: 0

## 2019-07-07 NOTE — ED NOTES
Pt to ED via squad with c/o drug overdose. Pt was found unresponsive at a house on [address] with one other female in the house. Pt was given 4mg Narcan through EJ and 2mg IN PTA. Pt ambulated to stretcher from wheelchair upon arrival to room. Squad states pt pulled out her EJ line just prior to arrival. Pt is stating she is cold but denies any use of drugs. Pt placed on monitor upon arrival, Dr. Arlin Crane at bedside to evaluate.       Ruchi Crenshaw RN  07/07/19 [id number]

## 2019-07-07 NOTE — ED PROVIDER NOTES
Lifestyle    Physical activity:     Days per week: Not on file     Minutes per session: Not on file    Stress: Not on file   Relationships    Social connections:     Talks on phone: Not on file     Gets together: Not on file     Attends Rastafari service: Not on file     Active member of club or organization: Not on file     Attends meetings of clubs or organizations: Not on file     Relationship status: Not on file    Intimate partner violence:     Fear of current or ex partner: Not on file     Emotionally abused: Not on file     Physically abused: Not on file     Forced sexual activity: Not on file   Other Topics Concern    Not on file   Social History Narrative    Not on file       No family history on file. Allergies:  Penicillins    Home Medications:  Prior to Admission medications    Medication Sig Start Date End Date Taking? Authorizing Provider   QUEtiapine (SEROQUEL) 50 MG tablet Take 1 tablet by mouth 2 times daily 2/20/19   Katie Page, APRN - CNS   VORTIoxetine (TRINTELLIX) 5 MG tablet Take 3 tablets by mouth daily 2/21/19   Leva Abel, APRN - CNS   ondansetron (ZOFRAN) 4 MG tablet Take 1 tablet by mouth every 8 hours as needed for Nausea or Vomiting 2/20/19   Leva Mons, APRN - CNS       REVIEW OF SYSTEMS    (2-9 systems for level 4, 10 or more for level 5)      Review of Systems   Constitutional: Negative for chills and fever. HENT: Negative for sore throat and trouble swallowing. Eyes: Negative for photophobia. Respiratory: Negative for cough, chest tightness, shortness of breath and wheezing. Cardiovascular: Negative for chest pain and palpitations. Gastrointestinal: Negative for abdominal pain, nausea and vomiting. Endocrine: Negative for polyuria. Genitourinary: Negative for dysuria and flank pain. Musculoskeletal: Negative for back pain and neck pain. Skin: Negative for rash and wound. Neurological: Negative for syncope, weakness, light-headedness and headaches. cardiologist.    EMERGENCY DEPARTMENT COURSE:  Patient alert and oriented on arrival.  No acute distress, nontoxic-appearing. Patient hypotensive. Given a total of 6 mg of Narcan prior to arrival.  No additional doses given in the ED. Orally hydrating, monitoring blood pressure. Pressure in the low 100s, high 90s. Patient remains alert and oriented. Pressure improved, ambulating, tolerating PO. Pt discharged. PROCEDURES:  None    CONSULTS:  None    CRITICAL CARE:  None    FINAL IMPRESSION      1. Accidental drug overdose, initial encounter          DISPOSITION / PLAN     DISPOSITION        PATIENT REFERRED TO:  No follow-up provider specified.     DISCHARGE MEDICATIONS:  New Prescriptions    No medications on file       Tasia Mahan MD  Emergency Medicine Resident    (Please note that portions of thisnote were completed with a voice recognition program.  Efforts were made to edit the dictations but occasionally words are mis-transcribed.)       Tasia Mahan MD  07/07/19 4418

## 2019-07-17 ENCOUNTER — APPOINTMENT (OUTPATIENT)
Dept: GENERAL RADIOLOGY | Age: 29
End: 2019-07-17
Payer: MEDICARE

## 2019-07-17 ENCOUNTER — HOSPITAL ENCOUNTER (OUTPATIENT)
Age: 29
Setting detail: OBSERVATION
Discharge: HOME | End: 2019-07-19
Attending: EMERGENCY MEDICINE | Admitting: EMERGENCY MEDICINE
Payer: MEDICARE

## 2019-07-17 DIAGNOSIS — L03.211 CELLULITIS OF FACE: Primary | ICD-10-CM

## 2019-07-17 LAB
-: ABNORMAL
AMORPHOUS: ABNORMAL
BACTERIA: ABNORMAL
BILIRUBIN URINE: NEGATIVE
CASTS UA: ABNORMAL /LPF (ref 0–8)
COLOR: ABNORMAL
CRYSTALS, UA: ABNORMAL /HPF
EPITHELIAL CELLS UA: ABNORMAL /HPF (ref 0–5)
GLUCOSE URINE: NEGATIVE
KETONES, URINE: ABNORMAL
LEUKOCYTE ESTERASE, URINE: ABNORMAL
MUCUS: ABNORMAL
NITRITE, URINE: NEGATIVE
OTHER OBSERVATIONS UA: ABNORMAL
PH UA: 5 (ref 5–8)
PROTEIN UA: ABNORMAL
RBC UA: ABNORMAL /HPF (ref 0–4)
RENAL EPITHELIAL, UA: ABNORMAL /HPF
SPECIFIC GRAVITY UA: 1.03 (ref 1–1.03)
TRICHOMONAS: ABNORMAL
TURBIDITY: ABNORMAL
URINE HGB: NEGATIVE
UROBILINOGEN, URINE: NORMAL
WBC UA: ABNORMAL /HPF (ref 0–5)
YEAST: ABNORMAL

## 2019-07-17 PROCEDURE — 85651 RBC SED RATE NONAUTOMATED: CPT

## 2019-07-17 PROCEDURE — 93005 ELECTROCARDIOGRAM TRACING: CPT

## 2019-07-17 PROCEDURE — 83605 ASSAY OF LACTIC ACID: CPT

## 2019-07-17 PROCEDURE — 71045 X-RAY EXAM CHEST 1 VIEW: CPT

## 2019-07-17 PROCEDURE — 86038 ANTINUCLEAR ANTIBODIES: CPT

## 2019-07-17 PROCEDURE — 86140 C-REACTIVE PROTEIN: CPT

## 2019-07-17 PROCEDURE — 81001 URINALYSIS AUTO W/SCOPE: CPT

## 2019-07-17 PROCEDURE — 80053 COMPREHEN METABOLIC PANEL: CPT

## 2019-07-17 PROCEDURE — 85610 PROTHROMBIN TIME: CPT

## 2019-07-17 PROCEDURE — 87086 URINE CULTURE/COLONY COUNT: CPT

## 2019-07-17 PROCEDURE — 85025 COMPLETE CBC W/AUTO DIFF WBC: CPT

## 2019-07-17 PROCEDURE — 84484 ASSAY OF TROPONIN QUANT: CPT

## 2019-07-17 PROCEDURE — 6370000000 HC RX 637 (ALT 250 FOR IP): Performed by: STUDENT IN AN ORGANIZED HEALTH CARE EDUCATION/TRAINING PROGRAM

## 2019-07-17 PROCEDURE — 99285 EMERGENCY DEPT VISIT HI MDM: CPT

## 2019-07-17 PROCEDURE — 87040 BLOOD CULTURE FOR BACTERIA: CPT

## 2019-07-17 PROCEDURE — 2580000003 HC RX 258: Performed by: STUDENT IN AN ORGANIZED HEALTH CARE EDUCATION/TRAINING PROGRAM

## 2019-07-17 PROCEDURE — 87389 HIV-1 AG W/HIV-1&-2 AB AG IA: CPT

## 2019-07-17 PROCEDURE — 85730 THROMBOPLASTIN TIME PARTIAL: CPT

## 2019-07-17 RX ORDER — 0.9 % SODIUM CHLORIDE 0.9 %
30 INTRAVENOUS SOLUTION INTRAVENOUS ONCE
Status: COMPLETED | OUTPATIENT
Start: 2019-07-17 | End: 2019-07-18

## 2019-07-17 RX ORDER — ACETAMINOPHEN 325 MG/1
650 TABLET ORAL ONCE
Status: COMPLETED | OUTPATIENT
Start: 2019-07-17 | End: 2019-07-17

## 2019-07-17 RX ADMIN — ACETAMINOPHEN 650 MG: 325 TABLET ORAL at 21:54

## 2019-07-17 RX ADMIN — SODIUM CHLORIDE 1000 ML: 9 INJECTION, SOLUTION INTRAVENOUS at 22:31

## 2019-07-17 ASSESSMENT — PAIN SCALES - GENERAL
PAINLEVEL_OUTOF10: 3
PAINLEVEL_OUTOF10: 5
PAINLEVEL_OUTOF10: 7

## 2019-07-17 ASSESSMENT — PAIN DESCRIPTION - PAIN TYPE: TYPE: ACUTE PAIN

## 2019-07-17 ASSESSMENT — PAIN DESCRIPTION - LOCATION
LOCATION: JAW
LOCATION: GENERALIZED

## 2019-07-18 ENCOUNTER — APPOINTMENT (OUTPATIENT)
Dept: CT IMAGING | Age: 29
End: 2019-07-18
Payer: MEDICARE

## 2019-07-18 PROBLEM — L03.90 CELLULITIS: Status: ACTIVE | Noted: 2019-07-18

## 2019-07-18 LAB
ABSOLUTE EOS #: 0.1 K/UL (ref 0–0.44)
ABSOLUTE IMMATURE GRANULOCYTE: 0.05 K/UL (ref 0–0.3)
ABSOLUTE LYMPH #: 1.43 K/UL (ref 1.1–3.7)
ABSOLUTE MONO #: 0.85 K/UL (ref 0.1–1.2)
ALBUMIN SERPL-MCNC: 4 G/DL (ref 3.5–5.2)
ALBUMIN/GLOBULIN RATIO: 1.5 (ref 1–2.5)
ALP BLD-CCNC: 54 U/L (ref 35–104)
ALT SERPL-CCNC: 16 U/L (ref 5–33)
ANION GAP SERPL CALCULATED.3IONS-SCNC: 14 MMOL/L (ref 9–17)
ANTI-NUCLEAR ANTIBODY (ANA): ABNORMAL
AST SERPL-CCNC: 35 U/L
BASOPHILS # BLD: 0 % (ref 0–2)
BASOPHILS ABSOLUTE: 0.04 K/UL (ref 0–0.2)
BILIRUB SERPL-MCNC: 0.75 MG/DL (ref 0.3–1.2)
BUN BLDV-MCNC: 12 MG/DL (ref 6–20)
BUN/CREAT BLD: ABNORMAL (ref 9–20)
C-REACTIVE PROTEIN: 23.4 MG/L (ref 0–5)
CALCIUM SERPL-MCNC: 8.5 MG/DL (ref 8.6–10.4)
CHLORIDE BLD-SCNC: 97 MMOL/L (ref 98–107)
CO2: 23 MMOL/L (ref 20–31)
CREAT SERPL-MCNC: 0.82 MG/DL (ref 0.5–0.9)
CULTURE: NORMAL
DIFFERENTIAL TYPE: ABNORMAL
EOSINOPHILS RELATIVE PERCENT: 1 % (ref 1–4)
GFR AFRICAN AMERICAN: >60 ML/MIN
GFR NON-AFRICAN AMERICAN: >60 ML/MIN
GFR SERPL CREATININE-BSD FRML MDRD: ABNORMAL ML/MIN/{1.73_M2}
GFR SERPL CREATININE-BSD FRML MDRD: ABNORMAL ML/MIN/{1.73_M2}
GLUCOSE BLD-MCNC: 94 MG/DL (ref 70–99)
HCT VFR BLD CALC: 34.9 % (ref 36.3–47.1)
HEMOGLOBIN: 12.3 G/DL (ref 11.9–15.1)
HIV AG/AB: NONREACTIVE
IMMATURE GRANULOCYTES: 1 %
INR BLD: 1
LACTIC ACID, SEPSIS WHOLE BLOOD: 1.1 MMOL/L (ref 0.5–1.9)
LACTIC ACID, SEPSIS: NORMAL MMOL/L (ref 0.5–1.9)
LYMPHOCYTES # BLD: 14 % (ref 24–43)
Lab: NORMAL
MCH RBC QN AUTO: 31.5 PG (ref 25.2–33.5)
MCHC RBC AUTO-ENTMCNC: 35.2 G/DL (ref 28.4–34.8)
MCV RBC AUTO: 89.3 FL (ref 82.6–102.9)
MONOCYTES # BLD: 8 % (ref 3–12)
NRBC AUTOMATED: 0 PER 100 WBC
PARTIAL THROMBOPLASTIN TIME: 24.4 SEC (ref 20.5–30.5)
PDW BLD-RTO: 11.9 % (ref 11.8–14.4)
PLATELET # BLD: 163 K/UL (ref 138–453)
PLATELET ESTIMATE: ABNORMAL
PMV BLD AUTO: 10.5 FL (ref 8.1–13.5)
POTASSIUM SERPL-SCNC: 3.9 MMOL/L (ref 3.7–5.3)
PROTHROMBIN TIME: 10.8 SEC (ref 9–12)
RBC # BLD: 3.91 M/UL (ref 3.95–5.11)
RBC # BLD: ABNORMAL 10*6/UL
SEDIMENTATION RATE, ERYTHROCYTE: 14 MM (ref 0–20)
SEG NEUTROPHILS: 76 % (ref 36–65)
SEGMENTED NEUTROPHILS ABSOLUTE COUNT: 8.04 K/UL (ref 1.5–8.1)
SODIUM BLD-SCNC: 134 MMOL/L (ref 135–144)
SPECIMEN DESCRIPTION: NORMAL
TOTAL CK: 566 U/L (ref 26–192)
TOTAL PROTEIN: 6.7 G/DL (ref 6.4–8.3)
TROPONIN INTERP: NORMAL
TROPONIN T: NORMAL NG/ML
TROPONIN, HIGH SENSITIVITY: <6 NG/L (ref 0–14)
WBC # BLD: 10.5 K/UL (ref 3.5–11.3)
WBC # BLD: ABNORMAL 10*3/UL

## 2019-07-18 PROCEDURE — 2580000003 HC RX 258: Performed by: STUDENT IN AN ORGANIZED HEALTH CARE EDUCATION/TRAINING PROGRAM

## 2019-07-18 PROCEDURE — 2500000003 HC RX 250 WO HCPCS: Performed by: STUDENT IN AN ORGANIZED HEALTH CARE EDUCATION/TRAINING PROGRAM

## 2019-07-18 PROCEDURE — 96376 TX/PRO/DX INJ SAME DRUG ADON: CPT

## 2019-07-18 PROCEDURE — 82550 ASSAY OF CK (CPK): CPT

## 2019-07-18 PROCEDURE — G0378 HOSPITAL OBSERVATION PER HR: HCPCS

## 2019-07-18 PROCEDURE — 6360000004 HC RX CONTRAST MEDICATION: Performed by: STUDENT IN AN ORGANIZED HEALTH CARE EDUCATION/TRAINING PROGRAM

## 2019-07-18 PROCEDURE — 96366 THER/PROPH/DIAG IV INF ADDON: CPT

## 2019-07-18 PROCEDURE — 96367 TX/PROPH/DG ADDL SEQ IV INF: CPT

## 2019-07-18 PROCEDURE — 6370000000 HC RX 637 (ALT 250 FOR IP): Performed by: STUDENT IN AN ORGANIZED HEALTH CARE EDUCATION/TRAINING PROGRAM

## 2019-07-18 PROCEDURE — 6370000000 HC RX 637 (ALT 250 FOR IP): Performed by: NURSE PRACTITIONER

## 2019-07-18 PROCEDURE — 36415 COLL VENOUS BLD VENIPUNCTURE: CPT

## 2019-07-18 PROCEDURE — 2580000003 HC RX 258: Performed by: EMERGENCY MEDICINE

## 2019-07-18 PROCEDURE — 6360000002 HC RX W HCPCS: Performed by: STUDENT IN AN ORGANIZED HEALTH CARE EDUCATION/TRAINING PROGRAM

## 2019-07-18 PROCEDURE — 70487 CT MAXILLOFACIAL W/DYE: CPT

## 2019-07-18 PROCEDURE — 6370000000 HC RX 637 (ALT 250 FOR IP): Performed by: INTERNAL MEDICINE

## 2019-07-18 PROCEDURE — 99219 PR INITIAL OBSERVATION CARE/DAY 50 MINUTES: CPT | Performed by: INTERNAL MEDICINE

## 2019-07-18 PROCEDURE — 96365 THER/PROPH/DIAG IV INF INIT: CPT

## 2019-07-18 RX ORDER — QUETIAPINE FUMARATE 25 MG/1
50 TABLET, FILM COATED ORAL 2 TIMES DAILY
Status: DISCONTINUED | OUTPATIENT
Start: 2019-07-18 | End: 2019-07-19 | Stop reason: HOSPADM

## 2019-07-18 RX ORDER — ACYCLOVIR 200 MG/1
200 CAPSULE ORAL
Status: DISCONTINUED | OUTPATIENT
Start: 2019-07-18 | End: 2019-07-19 | Stop reason: HOSPADM

## 2019-07-18 RX ORDER — ACETAMINOPHEN 325 MG/1
650 TABLET ORAL EVERY 4 HOURS PRN
Status: DISCONTINUED | OUTPATIENT
Start: 2019-07-18 | End: 2019-07-19 | Stop reason: HOSPADM

## 2019-07-18 RX ORDER — ONDANSETRON 2 MG/ML
4 INJECTION INTRAMUSCULAR; INTRAVENOUS EVERY 8 HOURS PRN
Status: DISCONTINUED | OUTPATIENT
Start: 2019-07-18 | End: 2019-07-19 | Stop reason: HOSPADM

## 2019-07-18 RX ORDER — SODIUM CHLORIDE 0.9 % (FLUSH) 0.9 %
10 SYRINGE (ML) INJECTION PRN
Status: DISCONTINUED | OUTPATIENT
Start: 2019-07-18 | End: 2019-07-19 | Stop reason: HOSPADM

## 2019-07-18 RX ORDER — METRONIDAZOLE 500 MG/1
500 TABLET ORAL EVERY 8 HOURS SCHEDULED
Status: DISCONTINUED | OUTPATIENT
Start: 2019-07-18 | End: 2019-07-18

## 2019-07-18 RX ORDER — SODIUM CHLORIDE 0.9 % (FLUSH) 0.9 %
10 SYRINGE (ML) INJECTION EVERY 12 HOURS SCHEDULED
Status: DISCONTINUED | OUTPATIENT
Start: 2019-07-18 | End: 2019-07-19 | Stop reason: HOSPADM

## 2019-07-18 RX ADMIN — Medication 1250 MG: at 12:41

## 2019-07-18 RX ADMIN — ACYCLOVIR 200 MG: 200 CAPSULE ORAL at 22:17

## 2019-07-18 RX ADMIN — IOHEXOL 75 ML: 350 INJECTION, SOLUTION INTRAVENOUS at 01:27

## 2019-07-18 RX ADMIN — Medication 1250 MG: at 00:35

## 2019-07-18 RX ADMIN — AZTREONAM 1 G: 1 INJECTION, POWDER, LYOPHILIZED, FOR SOLUTION INTRAMUSCULAR; INTRAVENOUS at 02:59

## 2019-07-18 RX ADMIN — QUETIAPINE FUMARATE 50 MG: 25 TABLET ORAL at 22:17

## 2019-07-18 RX ADMIN — AZTREONAM 1 G: 1 INJECTION, POWDER, LYOPHILIZED, FOR SOLUTION INTRAMUSCULAR; INTRAVENOUS at 15:29

## 2019-07-18 RX ADMIN — SODIUM CHLORIDE: 9 INJECTION, SOLUTION INTRAVENOUS at 12:42

## 2019-07-18 RX ADMIN — METRONIDAZOLE 500 MG: 500 TABLET, FILM COATED ORAL at 15:41

## 2019-07-18 ASSESSMENT — ENCOUNTER SYMPTOMS
VOMITING: 0
SORE THROAT: 1
NAUSEA: 0
SHORTNESS OF BREATH: 0
EYE DISCHARGE: 0
COLOR CHANGE: 0
EYE PAIN: 0
ALLERGIC/IMMUNOLOGIC NEGATIVE: 1
DIARRHEA: 0
CHOKING: 0

## 2019-07-18 ASSESSMENT — PAIN SCALES - GENERAL
PAINLEVEL_OUTOF10: 0
PAINLEVEL_OUTOF10: 0

## 2019-07-18 NOTE — ED NOTES
Report given to RN on floor. Awaiting bed to be placed. Pt resting in bed in NAD w/ rr even and unlabored. Will continue to monitor.      Marisa Marie RN  07/18/19 7944

## 2019-07-18 NOTE — CONSULTS
Pharmacy Note  Vancomycin Consult    Nandini Blackman is a 34 y.o. female started on Vancomycin for cellulitis of face; consult received from Dr. Becca Jones to manage therapy. Also receiving the following antibiotics: Aztreonam.    Patient Active Problem List   Diagnosis    Methadone maintenance    Hepatitis C    Asthma    Thrombocytopenia    H/O 29 wk indicated  delivery    H/O CS x 2    HRP (high risk pregnancy)    RLTCS 17 M APG 8/9 Wt 7#12    Lost custody of children    Opioid dependence with withdrawal ([facility] [address]    Polysubstance dependence ([facility] [address]    Bipolar I disorder, most recent episode depressed ([facility] [address]    Suicidal ideation    Severe malnutrition ([facility] [address]    Cellulitis       Allergies:  Penicillins     Temp max: 98.8 F    Recent Labs     19  0058   BUN 12       Recent Labs     19  0058   CREATININE 0.82       Recent Labs     19  0058   WBC 10.5       No intake or output data in the 24 hours ending 19 0517    Culture Date      Source                       Results  19   blood x2   in process  19    urine   in process      Ht Readings from Last 1 Encounters:   19 5' 1\" (1.549 m)        Wt Readings from Last 1 Encounters:   19 170 lb (77.1 kg)         Body mass index is 32.12 kg/m². Estimated Creatinine Clearance: 95 mL/min (based on SCr of 0.82 mg/dL). Goal Trough Level: 15-20 mcg/mL    Assessment/Plan:  Will initiate vancomycin 1250 mg IV every 12 hours. Timing of trough level will be determined based on culture results, renal function, and clinical response. Thank you for the consult. Will continue to follow. Charu Carrion.  Miguelito Call  2019  5:22 AM

## 2019-07-18 NOTE — ED NOTES
Writer introduced self to pt, updated pt on poc  Pt agreeable to poc at this time  Pt in NAD, identifies no needs or complaints at this time  Will continue to monitor     Anat Rodriguez RN  07/17/19 4284

## 2019-07-18 NOTE — ED NOTES
Pt in NAD at this time, call light within reach, resp even and non labored  Awaiting obs admission, will continue to monitor     Mary Kay Rob RN  07/18/19 6976

## 2019-07-18 NOTE — ED NOTES
Pt returned from restroom at this time, cardiac monitoring resumed  Pt in NAD, resp even and non labored at this time  Pt in NAD, call light within reach, will continue to monitor pt     Sol PARMINDER Pettit  07/18/19 0106

## 2019-07-18 NOTE — ED NOTES
Pt resting comfortably on stretcher, eyes closed, resp even and non labored  Pt in NAD at this time, awaiting obs admission  Will continue to monitor pt     Earl Crouch RN  07/18/19 3911

## 2019-07-18 NOTE — ED PROVIDER NOTES
Dr Shazia Mo sign out, od, percocet, septic work up, ct facial pending, (facial infective process)  pt needing admission       Didi Marin,   07/17/19 2312    abx started, pt admitted,      Didi Marin,   07/18/19 3296 [facility],   07/18/19 2802
purulent drainage especially at the corner of the mouth, oropharynx no obvious abnormalities internally. Cardiac exam tachycardic no murmurs rubs gallops, pulmonary clear bilaterally abdomen is soft nontender nondistended, no rash on exposed surfaces of the arms and legs.     Impression: Accidental overdose, concern for facial infection or rheumatologic manifestation with superimposed cellulitis    Plan: Labs, CT face with contrast, antibiotics    EKG Interpretation  EKG Interpretation    Interpreted by emergency department physician    Rhythm: sinus tachycardia  Rate: 104  Axis: normal  Ectopy: none  Conduction: normal  ST Segments: normal  T Waves: normal  Q Waves: none    EKG  Impression: sinus tachycardia    Armani Silva MD      Interpreted by ciro Reynolds MD  Attending Emergency Physician         Armani Silva MD  07/17/19 7621
7/17/2019  EXAMINATION: ONE XRAY VIEW OF THE CHEST 7/17/2019 9:17 pm COMPARISON: Acute abdominal series dated 12/13/2018 HISTORY: ORDERING SYSTEM PROVIDED HISTORY: sepsis TECHNOLOGIST PROVIDED HISTORY: sepsis Reason for Exam: upright FINDINGS: Cardiomediastinal silhouette and pulmonary vasculature are within normal limits. No focal airspace consolidation, pneumothorax, or pleural effusion. No free air beneath the diaphragm. No acute osseous abnormality. No acute intrathoracic process. RECENT VITALS:     Temp: 98.8 °F (37.1 °C),  Pulse: 88, Resp: 16, BP: 100/69, SpO2: 100 %    This patient is a 34 y.o. Female with chief complaint of skin condition. Patient is admitted for IV antibiotics. Patient also presented as a drug overdose, and has required no additional Narcan. OUTSTANDING TASKS / RECOMMENDATIONS:    1. Waiting for transfer to bed     FINAL IMPRESSION:     1. Cellulitis of face        DISPOSITION:         DISPOSITION:  []  Discharge   []  Transfer -    [x]  Admission -  Observation unit   []  Against Medical Advice   []  Eloped   FOLLOW-UP: No follow-up provider specified.    DISCHARGE MEDICATIONS: New Prescriptions    No medications on file           Deisy Galindo DO  Emergency Medicine Resident  [facility]      Deisy Galindo [place]  Resident  07/18/19 0476
lb (77.1 kg)   LMP 06/28/2019 (Approximate)   SpO2 98%   BMI 32.12 kg/m²     CONSTITUTIONAL: Vital signs reviewed, Alert and oriented X 3. HEAD: Atraumatic, Normocephalic. EYES: Eyes are normal to inspection, Pupils equal, round and reactive to light. NECK: Normal ROM, No jugular venous distention, No meningeal signs. RESPIRATORY CHEST: No respiratory distress. ABDOMEN: Abdomen is nontender, No distension. No pulsatile masses palpated. BACK:  No midline bony tenderness to palpation. UPPER EXTREMITY: Inspection normal, No cyanosis. LOWER EXTREMITY: Pulses are 2+ and equal bilaterally with cap refill < 2 seconds. NEURO: GCS is 15.  5/5 strength in bilateral lower extremities with sensation to light touch intact. SKIN: Skin is warm, Skin is dry. Cellulitis appreciated to face with swelling and numerous open sores with purulent drainage. PSYCHIATRIC: Oriented X 3, Normal affect.      Diagnostic Results     LABS:  Results for orders placed or performed during the hospital encounter of 07/17/19   CBC Auto Differential   Result Value Ref Range    WBC 10.5 3.5 - 11.3 k/uL    RBC 3.91 (L) 3.95 - 5.11 m/uL    Hemoglobin 12.3 11.9 - 15.1 g/dL    Hematocrit 34.9 (L) 36.3 - 47.1 %    MCV 89.3 82.6 - 102.9 fL    MCH 31.5 25.2 - 33.5 pg    MCHC 35.2 (H) 28.4 - 34.8 g/dL    RDW 11.9 11.8 - 14.4 %    Platelets 744 272 - 853 k/uL    MPV 10.5 8.1 - 13.5 fL    NRBC Automated 0.0 0.0 per 100 WBC    Differential Type NOT REPORTED     Seg Neutrophils 76 (H) 36 - 65 %    Lymphocytes 14 (L) 24 - 43 %    Monocytes 8 3 - 12 %    Eosinophils % 1 1 - 4 %    Basophils 0 0 - 2 %    Immature Granulocytes 1 (H) 0 %    Segs Absolute 8.04 1.50 - 8.10 k/uL    Absolute Lymph # 1.43 1.10 - 3.70 k/uL    Absolute Mono # 0.85 0.10 - 1.20 k/uL    Absolute Eos # 0.10 0.00 - 0.44 k/uL    Basophils # 0.04 0.00 - 0.20 k/uL    Absolute Immature Granulocyte 0.05 0.00 - 0.30 k/uL    WBC Morphology NOT REPORTED     RBC Morphology NOT

## 2019-07-18 NOTE — H&P
smoked. She has never used smokeless tobacco. She reports that she has current or past drug history. Drug: Cocaine. She reports that she does not drink alcohol. I have reviewed and agree with all Social.  There are no concerns for substance abuse/use. PHYSICAL EXAM     INITIAL VITALS:  height is 5' 1\" (1.549 m) and weight is 181 lb 8 oz (82.3 kg). Her oral temperature is 98.6 °F (37 °C). Her blood pressure is 107/68 and her pulse is 98. Her respiration is 16 and oxygen saturation is 96%.       CONSTITUTIONAL: AOx4, no apparent distress, appears stated age    HEAD: normocephalic, atraumatic, edematous left cheek with numerous open sores and some with scabbing, warm, erythematous, tender to palpation   EYES: PERRLA, EOMI    ENT: moist mucous membranes, uvula midline, non-swollen tongue, no obstruction of the oropharynx   NECK: supple, symmetric   BACK: symmetric   LUNGS: clear to auscultation bilaterally   CARDIOVASCULAR: regular rate and rhythm, no murmurs, rubs or gallops   ABDOMEN: soft, non-tender, non-distended with normal active bowel sounds   NEUROLOGIC:  MAEx4, no focal sensory or motor deficits   MUSCULOSKELETAL: no clubbing, cyanosis or edema   SKIN: no rash or wounds       DIFFERENTIAL DIAGNOSIS/MDM:   DX: Cellulitis, impetigo, angioedema, unspecified drug reaction, sepsis    DIAGNOSTIC RESULTS     EKG: All EKG's are interpreted by the Observation Physician who either signs or Co-signs this chart in the absence of a cardiologist.    EKG Interpretation    EKG taken in the ED on 7/17/2019    Interpreted by observation physician    Rhythm: sinus tachycardia  Rate: 100-110  Axis: normal  Ectopy: none  Conduction: normal  ST Segments: no acute change and normal  T Waves: inversion in  III  Q Waves: V3, and aVF    Clinical Impression: non-specific EKG, change from prior EKG on 02/13/2019    Donald Stevens DO        RADIOLOGY:   I directly visualized the following  images and reviewed the radiologist

## 2019-07-19 VITALS
OXYGEN SATURATION: 98 % | HEIGHT: 61 IN | SYSTOLIC BLOOD PRESSURE: 108 MMHG | DIASTOLIC BLOOD PRESSURE: 66 MMHG | HEART RATE: 80 BPM | TEMPERATURE: 98.6 F | BODY MASS INDEX: 34.27 KG/M2 | RESPIRATION RATE: 16 BRPM | WEIGHT: 181.5 LBS

## 2019-07-19 LAB
ABSOLUTE EOS #: 0.43 K/UL (ref 0–0.44)
ABSOLUTE IMMATURE GRANULOCYTE: <0.03 K/UL (ref 0–0.3)
ABSOLUTE LYMPH #: 1.84 K/UL (ref 1.1–3.7)
ABSOLUTE MONO #: 0.54 K/UL (ref 0.1–1.2)
ALBUMIN SERPL-MCNC: 3.1 G/DL (ref 3.5–5.2)
ALBUMIN/GLOBULIN RATIO: 1.6 (ref 1–2.5)
ALP BLD-CCNC: 42 U/L (ref 35–104)
ALT SERPL-CCNC: 10 U/L (ref 5–33)
ANION GAP SERPL CALCULATED.3IONS-SCNC: 7 MMOL/L (ref 9–17)
AST SERPL-CCNC: 14 U/L
BASOPHILS # BLD: 1 % (ref 0–2)
BASOPHILS ABSOLUTE: 0.03 K/UL (ref 0–0.2)
BILIRUB SERPL-MCNC: 0.22 MG/DL (ref 0.3–1.2)
BUN BLDV-MCNC: 4 MG/DL (ref 6–20)
BUN/CREAT BLD: ABNORMAL (ref 9–20)
CALCIUM SERPL-MCNC: 8.1 MG/DL (ref 8.6–10.4)
CHLORIDE BLD-SCNC: 109 MMOL/L (ref 98–107)
CO2: 27 MMOL/L (ref 20–31)
CREAT SERPL-MCNC: 0.62 MG/DL (ref 0.5–0.9)
DIFFERENTIAL TYPE: ABNORMAL
EKG ATRIAL RATE: 104 BPM
EKG P AXIS: 70 DEGREES
EKG P-R INTERVAL: 150 MS
EKG Q-T INTERVAL: 348 MS
EKG QRS DURATION: 78 MS
EKG QTC CALCULATION (BAZETT): 457 MS
EKG R AXIS: 37 DEGREES
EKG T AXIS: 30 DEGREES
EKG VENTRICULAR RATE: 104 BPM
EOSINOPHILS RELATIVE PERCENT: 8 % (ref 1–4)
GFR AFRICAN AMERICAN: >60 ML/MIN
GFR NON-AFRICAN AMERICAN: >60 ML/MIN
GFR SERPL CREATININE-BSD FRML MDRD: ABNORMAL ML/MIN/{1.73_M2}
GFR SERPL CREATININE-BSD FRML MDRD: ABNORMAL ML/MIN/{1.73_M2}
GLUCOSE BLD-MCNC: 94 MG/DL (ref 70–99)
HCT VFR BLD CALC: 30.1 % (ref 36.3–47.1)
HEMOGLOBIN: 10.6 G/DL (ref 11.9–15.1)
IMMATURE GRANULOCYTES: 0 %
LYMPHOCYTES # BLD: 33 % (ref 24–43)
MCH RBC QN AUTO: 31.5 PG (ref 25.2–33.5)
MCHC RBC AUTO-ENTMCNC: 35.2 G/DL (ref 28.4–34.8)
MCV RBC AUTO: 89.6 FL (ref 82.6–102.9)
MONOCYTES # BLD: 10 % (ref 3–12)
NRBC AUTOMATED: 0 PER 100 WBC
PDW BLD-RTO: 12.2 % (ref 11.8–14.4)
PLATELET # BLD: 127 K/UL (ref 138–453)
PLATELET ESTIMATE: ABNORMAL
PMV BLD AUTO: 10.3 FL (ref 8.1–13.5)
POTASSIUM SERPL-SCNC: 3.4 MMOL/L (ref 3.7–5.3)
RBC # BLD: 3.36 M/UL (ref 3.95–5.11)
RBC # BLD: ABNORMAL 10*6/UL
SEG NEUTROPHILS: 49 % (ref 36–65)
SEGMENTED NEUTROPHILS ABSOLUTE COUNT: 2.74 K/UL (ref 1.5–8.1)
SODIUM BLD-SCNC: 143 MMOL/L (ref 135–144)
TOTAL PROTEIN: 5.1 G/DL (ref 6.4–8.3)
WBC # BLD: 5.6 K/UL (ref 3.5–11.3)
WBC # BLD: ABNORMAL 10*3/UL

## 2019-07-19 PROCEDURE — 6370000000 HC RX 637 (ALT 250 FOR IP): Performed by: STUDENT IN AN ORGANIZED HEALTH CARE EDUCATION/TRAINING PROGRAM

## 2019-07-19 PROCEDURE — 6360000002 HC RX W HCPCS: Performed by: STUDENT IN AN ORGANIZED HEALTH CARE EDUCATION/TRAINING PROGRAM

## 2019-07-19 PROCEDURE — G0378 HOSPITAL OBSERVATION PER HR: HCPCS

## 2019-07-19 PROCEDURE — 80053 COMPREHEN METABOLIC PANEL: CPT

## 2019-07-19 PROCEDURE — 2580000003 HC RX 258: Performed by: EMERGENCY MEDICINE

## 2019-07-19 PROCEDURE — 2580000003 HC RX 258: Performed by: STUDENT IN AN ORGANIZED HEALTH CARE EDUCATION/TRAINING PROGRAM

## 2019-07-19 PROCEDURE — 6370000000 HC RX 637 (ALT 250 FOR IP): Performed by: INTERNAL MEDICINE

## 2019-07-19 PROCEDURE — 76937 US GUIDE VASCULAR ACCESS: CPT

## 2019-07-19 PROCEDURE — 85025 COMPLETE CBC W/AUTO DIFF WBC: CPT

## 2019-07-19 PROCEDURE — 99232 SBSQ HOSP IP/OBS MODERATE 35: CPT | Performed by: INTERNAL MEDICINE

## 2019-07-19 RX ORDER — DIPHENHYDRAMINE HCL 25 MG
25 TABLET ORAL ONCE
Status: COMPLETED | OUTPATIENT
Start: 2019-07-19 | End: 2019-07-19

## 2019-07-19 RX ORDER — SULFAMETHOXAZOLE AND TRIMETHOPRIM 800; 160 MG/1; MG/1
1 TABLET ORAL 2 TIMES DAILY
Qty: 14 TABLET | Refills: 0 | Status: ON HOLD | OUTPATIENT
Start: 2019-07-19 | End: 2019-07-27 | Stop reason: HOSPADM

## 2019-07-19 RX ORDER — VALACYCLOVIR HYDROCHLORIDE 1 G/1
1000 TABLET, FILM COATED ORAL 2 TIMES DAILY
Qty: 20 TABLET | Refills: 0 | Status: ON HOLD | OUTPATIENT
Start: 2019-07-19 | End: 2019-07-27 | Stop reason: HOSPADM

## 2019-07-19 RX ORDER — DIPHENHYDRAMINE HCL 25 MG
25 TABLET ORAL EVERY 6 HOURS PRN
Status: DISCONTINUED | OUTPATIENT
Start: 2019-07-19 | End: 2019-07-19 | Stop reason: HOSPADM

## 2019-07-19 RX ADMIN — Medication 1250 MG: at 14:08

## 2019-07-19 RX ADMIN — ACYCLOVIR 200 MG: 200 CAPSULE ORAL at 18:38

## 2019-07-19 RX ADMIN — ACYCLOVIR 200 MG: 200 CAPSULE ORAL at 14:09

## 2019-07-19 RX ADMIN — ACYCLOVIR 200 MG: 200 CAPSULE ORAL at 03:38

## 2019-07-19 RX ADMIN — DIPHENHYDRAMINE HCL 25 MG: 25 TABLET ORAL at 18:38

## 2019-07-19 RX ADMIN — Medication 1250 MG: at 00:42

## 2019-07-19 RX ADMIN — QUETIAPINE FUMARATE 50 MG: 25 TABLET ORAL at 08:44

## 2019-07-19 RX ADMIN — ACYCLOVIR 200 MG: 200 CAPSULE ORAL at 16:49

## 2019-07-19 RX ADMIN — SODIUM CHLORIDE: 9 INJECTION, SOLUTION INTRAVENOUS at 00:32

## 2019-07-19 RX ADMIN — ACYCLOVIR 200 MG: 200 CAPSULE ORAL at 06:31

## 2019-07-19 ASSESSMENT — PAIN SCALES - GENERAL: PAINLEVEL_OUTOF10: 0

## 2019-07-19 NOTE — PROGRESS NOTES
[facility] - Daily Progress Note  Today's Date and Time: 7/19/2019, 9:22 AM    Impression :   Perioral skin lesions/ulcerations seem to be suggestive of an underlying viral process with secondary bacterial infection. The patient does have left-sided facial swelling and viral infections do not typically cause soft tissue swelling   Polysubstance abuse  Bipolar disorder  Hepatitis C virus infection    Recommendations:   Continue intravenous antimicrobial therapy with vancomycin for gram-positive coverage for the facial cellulitis. The patient does not need aztreonam/gram-negative coverage nor does she need anaerobic coverage. I will start her on oral acyclovir therapy for what appears to be underlying herpetic lesions with oral mucosa involvement  I will follow her clinical progress and adjust therapy accordingly    Interval History:   Leigha Cordoba is a 66-year-old female who has a history of drug abuse, hepatitis C virus infection, bipolar disorder, depression among other medical problems. She came into the emergency room after taking 3 Percocets to address the facial pain and swelling which had been worsening over a few days at home. The patient reported purulent drainage, fevers. The patient was admitted started on IV antimicrobial therapy for facial cellulitis. At the time of my evaluation she was not very conversant and was really trying to avoid answering questions. She was very vague on the beginnings of the skin lesions.     The patient has mostly perioral lesions with also some mucosal involvement especially at the angles of the mouth bilaterally.   The skin lesions seem to suggest viral etiology but the patient seems to clearly have some left-sided facial soft tissue swelling and secondary bacterial infection due to the honey crusting evident as depicted in the images below (Physical exam, from 7/18/19)     Current Evaluation:    Patient  Seen and examined at bedside. She was not very conversant today. Complained of perioral pain and difficulty opening her mouth due to pain. She has not had a bowel movent in \"a couple of days\" and does not remember when her last bowel movement occurred. No trouble urinating. Denies chest pain, SOB, abdominal pain, fevers/chills. Physical Examination :     Patient Vitals for the past 8 hrs:   BP Temp Pulse Resp SpO2   19 0845 108/66 98.6 °F (37 °C) 80 16 98 %     Temp (24hrs), Av °F (37.2 °C), Min:98.6 °F (37 °C), Max:99.9 °F (37.7 °C)    Bilateral buccal swelling noted. L < R, spreading up to temporal region on L. Blisters noted to lips, bilateral cheeks. Unable to open her mouth completely due to pain from blisters on the corners of her mouth and lips. Open sores noted to bilateral buccal mucosa. Denies any sores down the back of her throat, difficulty swallowing, respiratory compromise. General Appearance: Awake, alert. Eyes: Sclera anicteric; conjunctivae pink. No hemorhages, no embolic phenomena. ENT:Oropharynx clear, without erythema, exudate, or thrush. No tenderness of sinuses. No nasal drainage. Neck: Supple, without lymphadenopathy. No JVD. Pulmonary/Chest: Clear to auscultation, without wheezes, rales, or rhonchi. No areas of consolidation. Good air movement bilaterally. Cardiovascular:Regular rate and rhythm without murmurs, rubs, or gallops. S1 and S2 normal.  Abdomen: Soft, non tender. Bowel sounds normal.  All four Extremities:No cyanosis, clubbing, edema, or effusions.         Pictures from 19:        Medical Decision Making:   I have independently reviewed/ordered the following labs:    CBC with Differential:   Recent Labs     19   WBC 10.5   HGB 12.3   HCT 34.9*      LYMPHOPCT 14*   MONOPCT 8     BMP:   Recent Labs     19   *   K 3.9   CL 97*   CO2 23   BUN 12   CREATININE 0.82     Hepatic Function Panel:   Recent Labs     19   PROT 6.7

## 2019-07-19 NOTE — DISCHARGE SUMMARY
CDU Discharge Summary        Patient:  Nadya Zhao  YOB: 1990    MRN: [id number]   Acct: [de-identified]    Primary Care Physician: No primary care provider on file. Admit date:  7/17/2019  9:10 PM  Discharge date: 7/19/2019  6:40 PM     Discharge Diagnoses:     Acute left cheek cellulitis, accompanied by herpes superinfection with impetigo secondary to skin infection  Improved with IV and PO acyclovir, PO benadryl, IV aztreonam, PO flagyl, IV vancomycin, and IVF    Follow-up:  Call today/tomorrow for a follow up appointment with No primary care provider on file. , or return to the Emergency Room with worsening symptoms    Stressed to patient the importance of following up with primary care doctor for further workup/management of symptoms. Pt verbalizes understanding and agrees with plan. Discharge Medications:  Changes to medications          [facility] Medication Instructions [id number]    Printed on:07/21/19 9856   Medication Information                      QUEtiapine (SEROQUEL) 50 MG tablet  Take 1 tablet by mouth 2 times daily             sulfamethoxazole-trimethoprim (BACTRIM DS;SEPTRA DS) 800-160 MG per tablet  Take 1 tablet by mouth 2 times daily for 7 days             valACYclovir (VALTREX) 1 g tablet  Take 1 tablet by mouth 2 times daily for 7 days                 Diet:  No diet orders on file , Advance as tolerated     Activity:  As tolerated    Consultants: PHARMACY TO DOSE VANCOMYCIN  IP CONSULT TO INFECTIOUS DISEASES  IP CONSULT TO IV TEAM    Procedures:  Not indicated     Diagnostic Test:   Results for orders placed or performed during the hospital encounter of 07/17/19   Urine Culture   Result Value Ref Range    Specimen Description . CLEAN CATCH URINE     Special Requests NOT REPORTED     Culture NO SIGNIFICANT GROWTH    Culture Blood #1   Result Value Ref Range    Specimen Description . BLOOD     Special Requests NO LOCATION GIVEN     Culture NO GROWTH 4 DAYS Hematocrit 30.1 (L) 36.3 - 47.1 %    MCV 89.6 82.6 - 102.9 fL    MCH 31.5 25.2 - 33.5 pg    MCHC 35.2 (H) 28.4 - 34.8 g/dL    RDW 12.2 11.8 - 14.4 %    Platelets 617 (L) 289 - 453 k/uL    MPV 10.3 8.1 - 13.5 fL    NRBC Automated 0.0 0.0 per 100 WBC    Differential Type NOT REPORTED     WBC Morphology NOT REPORTED     RBC Morphology NOT REPORTED     Platelet Estimate NOT REPORTED     Seg Neutrophils 49 36 - 65 %    Lymphocytes 33 24 - 43 %    Monocytes 10 3 - 12 %    Eosinophils % 8 (H) 1 - 4 %    Basophils 1 0 - 2 %    Immature Granulocytes 0 0 %    Segs Absolute 2.74 1.50 - 8.10 k/uL    Absolute Lymph # 1.84 1.10 - 3.70 k/uL    Absolute Mono # 0.54 0.10 - 1.20 k/uL    Absolute Eos # 0.43 0.00 - 0.44 k/uL    Basophils # 0.03 0.00 - 0.20 k/uL    Absolute Immature Granulocyte <0.03 0.00 - 0.30 k/uL   COMPREHENSIVE METABOLIC PANEL   Result Value Ref Range    Glucose 94 70 - 99 mg/dL    BUN 4 (L) 6 - 20 mg/dL    CREATININE 0.62 0.50 - 0.90 mg/dL    Bun/Cre Ratio NOT REPORTED 9 - 20    Calcium 8.1 (L) 8.6 - 10.4 mg/dL    Sodium 143 135 - 144 mmol/L    Potassium 3.4 (L) 3.7 - 5.3 mmol/L    Chloride 109 (H) 98 - 107 mmol/L    CO2 27 20 - 31 mmol/L    Anion Gap 7 (L) 9 - 17 mmol/L    Alkaline Phosphatase 42 35 - 104 U/L    ALT 10 5 - 33 U/L    AST 14 <32 U/L    Total Bilirubin 0.22 (L) 0.3 - 1.2 mg/dL    Total Protein 5.1 (L) 6.4 - 8.3 g/dL    Alb 3.1 (L) 3.5 - 5.2 g/dL    Albumin/Globulin Ratio 1.6 1.0 - 2.5    GFR Non-African American >60 >60 mL/min    GFR African American >60 >60 mL/min    GFR Comment          GFR Staging NOT REPORTED    EKG 12 Lead   Result Value Ref Range    Ventricular Rate 104 BPM    Atrial Rate 104 BPM    P-R Interval 150 ms    QRS Duration 78 ms    Q-T Interval 348 ms    QTc Calculation (Bazett) 457 ms    P Axis 70 degrees    R Axis 37 degrees    T Axis 30 degrees     Ct Facial Bones W Contrast    Result Date: 7/18/2019  EXAMINATION: CT OF THE FACE WITH CONTRAST 7/18/2019 TECHNIQUE: CT of the face

## 2019-07-20 ENCOUNTER — HOSPITAL ENCOUNTER (INPATIENT)
Age: 29
LOS: 9 days | Discharge: HOME | DRG: 753 | End: 2019-07-29
Attending: EMERGENCY MEDICINE | Admitting: PSYCHIATRY & NEUROLOGY
Payer: MEDICARE

## 2019-07-20 DIAGNOSIS — R45.851 SUICIDAL IDEATION: Primary | ICD-10-CM

## 2019-07-20 DIAGNOSIS — F32.A DEPRESSION, UNSPECIFIED DEPRESSION TYPE: ICD-10-CM

## 2019-07-20 PROBLEM — F31.4 BIPOLAR 1 DISORDER, DEPRESSED, SEVERE (HCC): Status: ACTIVE | Noted: 2019-07-20

## 2019-07-20 PROBLEM — F31.9 BIPOLAR DISORDER (HCC): Status: ACTIVE | Noted: 2019-07-20

## 2019-07-20 PROCEDURE — 6370000000 HC RX 637 (ALT 250 FOR IP): Performed by: NURSE PRACTITIONER

## 2019-07-20 PROCEDURE — 99285 EMERGENCY DEPT VISIT HI MDM: CPT

## 2019-07-20 PROCEDURE — 1240000000 HC EMOTIONAL WELLNESS R&B

## 2019-07-20 RX ORDER — TRAZODONE HYDROCHLORIDE 50 MG/1
50 TABLET ORAL NIGHTLY PRN
Status: DISCONTINUED | OUTPATIENT
Start: 2019-07-20 | End: 2019-07-29 | Stop reason: HOSPADM

## 2019-07-20 RX ORDER — QUETIAPINE FUMARATE 50 MG/1
50 TABLET, FILM COATED ORAL 2 TIMES DAILY
Status: DISCONTINUED | OUTPATIENT
Start: 2019-07-20 | End: 2019-07-23

## 2019-07-20 RX ORDER — MAGNESIUM HYDROXIDE/ALUMINUM HYDROXICE/SIMETHICONE 120; 1200; 1200 MG/30ML; MG/30ML; MG/30ML
30 SUSPENSION ORAL EVERY 6 HOURS PRN
Status: DISCONTINUED | OUTPATIENT
Start: 2019-07-20 | End: 2019-07-29 | Stop reason: HOSPADM

## 2019-07-20 RX ORDER — ACETAMINOPHEN 325 MG/1
650 TABLET ORAL EVERY 4 HOURS PRN
Status: DISCONTINUED | OUTPATIENT
Start: 2019-07-20 | End: 2019-07-29 | Stop reason: HOSPADM

## 2019-07-20 RX ORDER — BENZTROPINE MESYLATE 1 MG/ML
2 INJECTION INTRAMUSCULAR; INTRAVENOUS 2 TIMES DAILY PRN
Status: DISCONTINUED | OUTPATIENT
Start: 2019-07-20 | End: 2019-07-29 | Stop reason: HOSPADM

## 2019-07-20 RX ORDER — NICOTINE 21 MG/24HR
1 PATCH, TRANSDERMAL 24 HOURS TRANSDERMAL DAILY
Status: DISCONTINUED | OUTPATIENT
Start: 2019-07-20 | End: 2019-07-22

## 2019-07-20 RX ADMIN — TRAZODONE HYDROCHLORIDE 50 MG: 50 TABLET ORAL at 21:38

## 2019-07-20 RX ADMIN — QUETIAPINE FUMARATE 50 MG: 50 TABLET ORAL at 21:38

## 2019-07-20 RX ADMIN — ACETAMINOPHEN 650 MG: 325 TABLET, FILM COATED ORAL at 21:38

## 2019-07-20 ASSESSMENT — PAIN SCALES - GENERAL
PAINLEVEL_OUTOF10: 3
PAINLEVEL_OUTOF10: 2
PAINLEVEL_OUTOF10: 7

## 2019-07-20 ASSESSMENT — PAIN DESCRIPTION - PAIN TYPE
TYPE: ACUTE PAIN

## 2019-07-20 ASSESSMENT — SLEEP AND FATIGUE QUESTIONNAIRES
DIFFICULTY ARISING: NO
RESTFUL SLEEP: NO
AVERAGE NUMBER OF SLEEP HOURS: 5
DO YOU USE A SLEEP AID: NO
DIFFICULTY STAYING ASLEEP: YES
DO YOU HAVE DIFFICULTY SLEEPING: YES
SLEEP PATTERN: DIFFICULTY FALLING ASLEEP;EARLY AWAKENING
DIFFICULTY FALLING ASLEEP: YES

## 2019-07-20 ASSESSMENT — LIFESTYLE VARIABLES: HISTORY_ALCOHOL_USE: NO

## 2019-07-20 ASSESSMENT — PAIN DESCRIPTION - LOCATION
LOCATION: FACE
LOCATION: MOUTH
LOCATION: MOUTH

## 2019-07-20 ASSESSMENT — PAIN DESCRIPTION - DESCRIPTORS: DESCRIPTORS: ACHING;DISCOMFORT;SORE

## 2019-07-20 ASSESSMENT — PATIENT HEALTH QUESTIONNAIRE - PHQ9: SUM OF ALL RESPONSES TO PHQ QUESTIONS 1-9: 18

## 2019-07-20 NOTE — ED PROVIDER NOTES
Exam   Constitutional: She is oriented to person, place, and time. She appears well-developed and well-nourished. No distress. HENT:   Head: Normocephalic and atraumatic. There is multiple ulcerated lesions have scabbed over with crusting along the face, the worse along the labial border of the left mouth. No intraoral involvement. No conjunctival involvement. Eyes: Pupils are equal, round, and reactive to light. EOM are normal.   Neck: Normal range of motion. Neck supple. Cardiovascular: Normal rate and regular rhythm. Exam reveals no gallop and no friction rub. No murmur heard. Pulmonary/Chest: Effort normal and breath sounds normal. No respiratory distress. She has no wheezes. She has no rales. Abdominal: Soft. She exhibits no distension. There is no tenderness. Musculoskeletal: Normal range of motion. She exhibits no deformity. Neurological: She is alert and oriented to person, place, and time. Skin: Skin is warm and dry. She is not diaphoretic. See HEENT exam for skin findings. Psychiatric: Her speech is normal. Thought content is not paranoid. Cognition and memory are normal. She exhibits a depressed mood. She expresses suicidal ideation. She expresses no homicidal ideation. She expresses suicidal plans. She expresses no homicidal plans. Nursing note and vitals reviewed. DIFFERENTIAL DIAGNOSIS/IMPRESSION     DDX: Suicidal ideations, herpetic infection with bacterial superinfection. Impression: 34 y.o. female who presents with suicidal ideations with plan to overdose on pills. Patient denies any homicidal ideations or hallucinations. Voluntarily seeking psychiatric help. Chin for facial cellulitis, sialitis is improved, but he she does still have the herpetic lesions with bacterial superinfection. Patient is well-appearing, no airway involvement. This can be continued on the oral antibiotics and antivirals prescribed by ID tired to discharge.   She does not need any further work-up for this. Therefore patient is medically cleared at this time. oes not appear to be clinically intoxicated. Patient is medically cleared at this time. Will speak to social work to facilitate possible psychiatric admission. DIAGNOSTIC RESULTS     EKG: All EKG's are interpreted by the Emergency Department Physician who either signs or Co-signs this chart in the absence of a cardiologist.    Not clinically indicated at this time. LABS: Labswere reviewed by me and abnormal results are displayed above     Labs Reviewed - No data to display    RADIOLOGY: All plain film, CT, MRI, and formal ultrasound images (except ED bedside ultrasound) are read by the radiologist, see reports below, unless otherwise noted in ED Course, MDM or here. No results found. BEDSIDE ULTRASOUND:  Not clinically indicated at this time. ED COURSE      ED Medication Orders (From admission, onward)    None          EMERGENCYDEPARTMENT COURSE:    Patient accepted for admission at this time. PROCEDURES:  None     CONSULTS:  None    CRITICAL CARE:  0 mins not including procedure time    FINAL IMPRESSION      1. Suicidal ideation    2. Depression, unspecified depression type          DISPOSITION / PLAN     DISPOSITION Decision To Admit 07/20/2019 01:26:11 PM      PATIENT REFERRED TO:  No follow-up provider specified.     DISCHARGE MEDICATIONS:  New Prescriptions    No medications on file       hPyllis Jiang MD  Emergency Medicine Attending      (Please note that portions of this note were completed with a voice recognition program.  Efforts were made to edit the dictations but occasionallywords are mis-transcribed.)          Phyllis Jiang MD  07/21/19 1348

## 2019-07-21 PROCEDURE — 6370000000 HC RX 637 (ALT 250 FOR IP): Performed by: NURSE PRACTITIONER

## 2019-07-21 PROCEDURE — 90792 PSYCH DIAG EVAL W/MED SRVCS: CPT | Performed by: NURSE PRACTITIONER

## 2019-07-21 PROCEDURE — 99254 IP/OBS CNSLTJ NEW/EST MOD 60: CPT | Performed by: INTERNAL MEDICINE

## 2019-07-21 PROCEDURE — 1240000000 HC EMOTIONAL WELLNESS R&B

## 2019-07-21 RX ORDER — CLONIDINE HYDROCHLORIDE 0.1 MG/1
0.1 TABLET ORAL NIGHTLY
Status: DISCONTINUED | OUTPATIENT
Start: 2019-07-21 | End: 2019-07-29 | Stop reason: HOSPADM

## 2019-07-21 RX ORDER — SULFAMETHOXAZOLE AND TRIMETHOPRIM 800; 160 MG/1; MG/1
1 TABLET ORAL 2 TIMES DAILY
Status: DISCONTINUED | OUTPATIENT
Start: 2019-07-21 | End: 2019-07-29 | Stop reason: HOSPADM

## 2019-07-21 RX ORDER — VALACYCLOVIR HYDROCHLORIDE 500 MG/1
1000 TABLET, FILM COATED ORAL 2 TIMES DAILY
Status: DISCONTINUED | OUTPATIENT
Start: 2019-07-21 | End: 2019-07-29 | Stop reason: HOSPADM

## 2019-07-21 RX ORDER — OXCARBAZEPINE 300 MG/1
300 TABLET, FILM COATED ORAL 2 TIMES DAILY
Status: DISCONTINUED | OUTPATIENT
Start: 2019-07-21 | End: 2019-07-29 | Stop reason: HOSPADM

## 2019-07-21 RX ADMIN — QUETIAPINE FUMARATE 50 MG: 50 TABLET ORAL at 21:19

## 2019-07-21 RX ADMIN — VALACYCLOVIR 1000 MG: 500 TABLET, FILM COATED ORAL at 21:19

## 2019-07-21 RX ADMIN — SULFAMETHOXAZOLE AND TRIMETHOPRIM 1 TABLET: 800; 160 TABLET ORAL at 21:19

## 2019-07-21 RX ADMIN — CLONIDINE HYDROCHLORIDE 0.1 MG: 0.1 TABLET ORAL at 21:18

## 2019-07-21 RX ADMIN — SULFAMETHOXAZOLE AND TRIMETHOPRIM 1 TABLET: 800; 160 TABLET ORAL at 13:20

## 2019-07-21 RX ADMIN — QUETIAPINE FUMARATE 50 MG: 50 TABLET ORAL at 08:43

## 2019-07-21 RX ADMIN — OXCARBAZEPINE 300 MG: 300 TABLET, FILM COATED ORAL at 21:18

## 2019-07-21 RX ADMIN — OXCARBAZEPINE 300 MG: 300 TABLET, FILM COATED ORAL at 13:21

## 2019-07-21 RX ADMIN — TRAZODONE HYDROCHLORIDE 50 MG: 50 TABLET ORAL at 21:19

## 2019-07-21 ASSESSMENT — ENCOUNTER SYMPTOMS
COUGH: 0
NAUSEA: 0
SHORTNESS OF BREATH: 0
VOMITING: 0
ABDOMINAL PAIN: 0
DIARRHEA: 0

## 2019-07-21 ASSESSMENT — LIFESTYLE VARIABLES: HISTORY_ALCOHOL_USE: NO

## 2019-07-21 NOTE — CARE COORDINATION
BHI Biopsychosocial Assessment    Current Level of Psychosocial Functioning     Independent   Dependent X   Minimal Assist     Comments:  PT reports that prior to admission she was living with her mother and reports that she is unaure of she wants to return there or not following discharge. PT reports no current income. PT presents on admission with suicidal ideation with a plan to overdose on Heroin. Psychosocial High Risk Factors (check all that apply)    Unable to obtain meds   Chronic illness/pain    Substance abuse X Cocaine, Heroin   Lack of Family Support X  Financial stress X  Isolation X  Inadequate Community ResourcesX  Suicide attempt(s)  Not taking medications X  Victim of crime   Developmental Delay  Unable to manage personal needs  X  Age 72 or older   Homeless  No transportation X  Readmission within 30 days  Unemployment  Traumatic Event    Psychiatric Advanced Directives: none reported     Family to Involve in Treatment: lack if family support     Sexual Orientation:  KAITLYNN    Patient Strengths: insurance, adequate housing     Patient Barriers: not linked with mental health agency or taking Psychiatric medications, substance abuse issues. Opiate Education Provided: PT was provided with Opiate addiction and relapse information. PT reports a history of Heroin and Cocaine use/abuse. CMHC/mental health history: PT is not currently linked with a [facility] George Denson, she is requesting to be relinked with the [facility] on admission,. Plan of Care   medication management, group/individual therapies, family meetings, psycho -education, treatment team meetings to assist with stabilization, referral to community resources. Initial Discharge Plan:  PT reports a plan to explore housing options following discharge and to follow up with Outpatient treatment at the [facility].  will provide PT with area resource information.        Clinical Summary:  Mel Lopez is a 34 y.o. female who was admitted for

## 2019-07-21 NOTE — BH NOTE
patient refused to attend relaxation group at 4pm after encouragement from staff.   1:1 talk time provided as alternative to group session

## 2019-07-21 NOTE — CONSULTS
[facility]    Consult Note. Date:   2019  Patient name:  Nandini Blackman  Date of admission:  2019  1:07 PM  MRN:   [id number]  YOB: 1990    Pt seen at the request of Gillian Short MD    CHIEF COMPLAINT:     History Obtained From:  Patient and chart review. HISTORY OF PRESENT ILLNESS:      The patient is a 34 y.o.  female who is admitted to the hospital for  Lesions   has hep c    Past Medical History:   has a past medical history of Abnormal Pap smear, Anemia, Anxiety, Asthma, Bipolar 1 disorder ([facility] [address] Depression, Drug abuse ([facility] [address] Hepatitis C, Kidney infection, Neurologic disorder, Postpartum depression, Seizures ([facility] [address] and Ulceration. Past Surgical History:   has a past surgical history that includes LEEP (); pr  delivery only (10-21-14); Abdomen surgery; and  section (N/A, 2017). Home Medications:    Prior to Admission medications    Medication Sig Start Date End Date Taking? Authorizing Provider   sulfamethoxazole-trimethoprim (BACTRIM DS;SEPTRA DS) 800-160 MG per tablet Take 1 tablet by mouth 2 times daily for 7 days 19 Yes Shane White MD   valACYclovir (VALTREX) 1 g tablet Take 1 tablet by mouth 2 times daily for 7 days 19 Yes Shane White MD   QUEtiapine (SEROQUEL) 50 MG tablet Take 1 tablet by mouth 2 times daily 19  Yes TOMY Faith - CNS       Allergies:  Penicillins    Social History:   reports that she has never smoked. She has never used smokeless tobacco. She reports that she has current or past drug history. Drug: Cocaine. She reports that she does not drink alcohol. Family History: family history is not on file. REVIEW OF SYSTEMS:    · Constitutional: Negative for weight loss  · Eyes: Negative for visual changes, diplopia, scleral icterus.   · ENT: Negative for Headaches, hearing *     BMP:    Recent Labs     07/19/19  1129      K 3.4*   *   CO2 27   BUN 4*   CREATININE 0.62   GLUCOSE 94     S. Calcium:  Recent Labs     07/19/19  1129   CALCIUM 8.1*     S. Ionized Calcium:No results for input(s): IONCA in the last 72 hours. S. Magnesium:No results for input(s): MG in the last 72 hours. S. Phosphorus:No results for input(s): PHOS in the last 72 hours. S. Glucose:No results for input(s): POCGLU in the last 72 hours. Glycosylated hemoglobin A1C: No results for input(s): LABA1C in the last 72 hours. INR: No results for input(s): INR in the last 72 hours. Hepatic functions:   Recent Labs     07/19/19  1129   ALKPHOS 42   ALT 10   AST 14   PROT 5.1*   BILITOT 0.22*   LABALBU 3.1*     Pancreatic functions:No results for input(s): LACTA, AMYLASE in the last 72 hours. S. Lactic Acid: No results for input(s): LACTA in the last 72 hours. Cardiac enzymes:No results for input(s): CKTOTAL, CKMB, CKMBINDEX, TROPONINI in the last 72 hours. BNP:No results for input(s): BNP in the last 72 hours. Lipid profile: No results for input(s): CHOL, TRIG, HDL, LDLCALC in the last 72 hours. Invalid input(s): LDL  Blood Gases: No results found for: PH, PCO2, PO2, HCO3, O2SAT  Thyroid functions:   Lab Results   Component Value Date    TSH 1.01 12/12/2018        Imaging/Diagonstics:    Ct Facial Bones W Contrast    Result Date: 7/18/2019  EXAMINATION: CT OF THE FACE WITH CONTRAST 7/18/2019 TECHNIQUE: CT of the face was performed with the administration of intravenous contrast. Multiplanar reformatted images are provided for review. Dose modulation, iterative reconstruction, and/or weight based adjustment of the mA/kV was utilized to reduce the radiation dose to as low as reasonably achievable. COMPARISON: None.  HISTORY: ORDERING SYSTEM PROVIDED HISTORY: swelling and infectious process TECHNOLOGIST PROVIDED HISTORY: Reason for Exam: swelling and infectious process Acuity: Unknown Type of

## 2019-07-22 PROCEDURE — 6370000000 HC RX 637 (ALT 250 FOR IP): Performed by: NURSE PRACTITIONER

## 2019-07-22 PROCEDURE — 99232 SBSQ HOSP IP/OBS MODERATE 35: CPT | Performed by: PSYCHIATRY & NEUROLOGY

## 2019-07-22 PROCEDURE — 1240000000 HC EMOTIONAL WELLNESS R&B

## 2019-07-22 RX ADMIN — QUETIAPINE FUMARATE 50 MG: 50 TABLET ORAL at 20:43

## 2019-07-22 RX ADMIN — SULFAMETHOXAZOLE AND TRIMETHOPRIM 1 TABLET: 800; 160 TABLET ORAL at 08:26

## 2019-07-22 RX ADMIN — VALACYCLOVIR 1000 MG: 500 TABLET, FILM COATED ORAL at 08:26

## 2019-07-22 RX ADMIN — SULFAMETHOXAZOLE AND TRIMETHOPRIM 1 TABLET: 800; 160 TABLET ORAL at 20:43

## 2019-07-22 RX ADMIN — TRAZODONE HYDROCHLORIDE 50 MG: 50 TABLET ORAL at 20:43

## 2019-07-22 RX ADMIN — OXCARBAZEPINE 300 MG: 300 TABLET, FILM COATED ORAL at 20:43

## 2019-07-22 RX ADMIN — VALACYCLOVIR 1000 MG: 500 TABLET, FILM COATED ORAL at 20:42

## 2019-07-22 RX ADMIN — OXCARBAZEPINE 300 MG: 300 TABLET, FILM COATED ORAL at 08:26

## 2019-07-22 RX ADMIN — CLONIDINE HYDROCHLORIDE 0.1 MG: 0.1 TABLET ORAL at 20:43

## 2019-07-22 RX ADMIN — QUETIAPINE FUMARATE 50 MG: 50 TABLET ORAL at 08:26

## 2019-07-22 RX ADMIN — ACETAMINOPHEN 650 MG: 325 TABLET, FILM COATED ORAL at 20:43

## 2019-07-22 ASSESSMENT — PAIN DESCRIPTION - PAIN TYPE: TYPE: ACUTE PAIN

## 2019-07-22 ASSESSMENT — PAIN DESCRIPTION - LOCATION: LOCATION: MOUTH

## 2019-07-22 ASSESSMENT — PAIN SCALES - GENERAL
PAINLEVEL_OUTOF10: 3
PAINLEVEL_OUTOF10: 2

## 2019-07-22 NOTE — H&P
not on file. The patient denies any pertinent family history. I have reviewed and agree with the family history entered. I have reviewed the Family History and it is not significant to the case    SOCIAL HISTORY      reports that she has never smoked. She has never used smokeless tobacco. She reports that she has current or past drug history. Drug: Cocaine. She reports that she does not drink alcohol. I have reviewed and agree with all Social.  There are no concerns for substance abuse/use. PHYSICAL EXAM     INITIAL VITALS:  height is 5' 4\" (1.626 m) and weight is 170 lb (77.1 kg). Her oral temperature is 97.8 °F (36.6 °C). Her blood pressure is 110/60 and her pulse is 69. Her respiration is 14 and oxygen saturation is 97%.       CONSTITUTIONAL: AOx4, no apparent distress, appears stated age    HEAD: normocephalic, atraumatic, edematous left cheek with numerous open sores and some with scabbing, warm, erythematous, tender to palpation   EYES: PERRLA, EOMI    ENT: moist mucous membranes, uvula midline, non-swollen tongue, no obstruction of the oropharynx   NECK: supple, symmetric   BACK: symmetric   LUNGS: clear to auscultation bilaterally   CARDIOVASCULAR: regular rate and rhythm, no murmurs, rubs or gallops   ABDOMEN: soft, non-tender, non-distended with normal active bowel sounds   NEUROLOGIC:  MAEx4, no focal sensory or motor deficits   MUSCULOSKELETAL: no clubbing, cyanosis or edema   SKIN: no rash or wounds       DIFFERENTIAL DIAGNOSIS/MDM:   DX: Cellulitis, impetigo, angioedema, unspecified drug reaction, sepsis    DIAGNOSTIC RESULTS     EKG: All EKG's are interpreted by the Observation Physician who either signs or Co-signs this chart in the absence of a cardiologist.    EKG Interpretation    EKG taken in the ED on 7/17/2019    Interpreted by observation physician    Rhythm: sinus tachycardia  Rate: 100-110  Axis: normal  Ectopy: none  Conduction: normal  ST Segments: no acute change and normal  T transcription that are not intended.

## 2019-07-22 NOTE — PLAN OF CARE
Problem: Pain:  Goal: Pain level will decrease  Description  Pain level will decrease  7/21/2019 2130 by Leonardo Lemus  Outcome: Ongoing   Relates of no pain at this time. Problem: Altered Mood, Depressive Behavior:  Goal: Able to verbalize and/or display a decrease in depressive symptoms  Description  Able to verbalize and/or display a decrease in depressive symptoms  7/21/2019 2130 by Leonardo Lemus  Outcome: Ongoing   Coop. With vitals taken. Cont to seclude self to room resting on bed. Head under blankets. Refuses offer of 1;1 , refuses offer of all unit groups et unit activities. Cont. With flat affect. Quiet and guarded. Out briefly for nourishment, again retreats to room to rest on bed. Accepting of all medications provided. Problem: Suicide risk  Goal: Provide patient with safe environment  Description  Provide patient with safe environment  7/21/2019 2130 by Leonardo Lemus  Outcome: Ongoing   Relates of feeling safe at this time. Able to contract for safety.

## 2019-07-22 NOTE — PROGRESS NOTES
Department of Psychiatry  Attending Physician Progress Note    Chief Complaint: depression and suicidal ideations    SUBJECTIVE:     The patient was feeling depressed. She has been reclusive and staying in bed with poor energy and feeling hopeless. She said \"I lost everything\". She continues to endorse suicidal ideations. She denied any visual or auditory hallucinations. No withdrawal symptoms. Sleep improved. There was no major side effects. There is no safe alternative other than the hospital treatment at this time. OBJECTIVE    Physical  VITALS:    /60 Comment: recheck  Pulse 69   Temp 97.8 °F (36.6 °C) (Oral)   Resp 14   Ht 5' 4\" (1.626 m)   Wt 170 lb (77.1 kg)   LMP 06/28/2019 (Approximate)   SpO2 97%   BMI 29.18 kg/m²     Mental Status Examination:    Level of consciousness:  Within normal limits  Appearance: Street clothes, seated, with good grooming  Behavior/Motor: No abnormalities noted  Attitude toward examiner:  Cooperative, attentive, good eye contact  Speech:  spontaneous, normal rate, normal volume and well articulated  Mood:  Depressed   Affect:  Mood-congruent, constricted in range. Thought processes:  linear, goal-directed and coherent  Thought content:  denies homicidal ideation  Suicidal Ideation:  Positive for suicidal ideation  Delusions:  no evidence of delusions  Perceptual Disturbance:  No visual or auditory hallucinations. Cognition:  Intact  Memory: grossly intact.   Insight & Judgement: partial       Medications  Current Facility-Administered Medications: sulfamethoxazole-trimethoprim (BACTRIM DS;SEPTRA DS) 800-160 MG per tablet 1 tablet, 1 tablet, Oral, BID  valACYclovir (VALTREX) tablet 1,000 mg, 1,000 mg, Oral, BID  OXcarbazepine (TRILEPTAL) tablet 300 mg, 300 mg, Oral, BID  cloNIDine (CATAPRES) tablet 0.1 mg, 0.1 mg, Oral, Nightly  QUEtiapine (SEROQUEL) tablet 50 mg, 50 mg, Oral, BID  acetaminophen (TYLENOL) tablet 650 mg, 650 mg, Oral, Q4H PRN  traZODone (DESYREL) tablet 50 mg, 50 mg, Oral, Nightly PRN  benztropine mesylate (COGENTIN) injection 2 mg, 2 mg, Intramuscular, BID PRN  magnesium hydroxide (MILK OF MAGNESIA) 400 MG/5ML suspension 30 mL, 30 mL, Oral, Daily PRN  aluminum & magnesium hydroxide-simethicone (MAALOX) 200-200-20 MG/5ML suspension 30 mL, 30 mL, Oral, Q6H PRN    No results found for this or any previous visit (from the past 72 hour(s)). ASSESSMENT     Active Problems:    Bipolar disorder ([facility] [address]    Bipolar 1 disorder, depressed, severe ([address]  Resolved Problems:    * No resolved hospital problems. *      PLAN    · Continue oxcarbazepine and Seroquel for mood stabilization. · Continue clonidine for insomnia  · Continue unit milieu and group psychotherapy.     Electronically Signed by Jl Silvestre MD , 7/22/2019 2:57 PM

## 2019-07-22 NOTE — PLAN OF CARE
Preoccupations  Hallucinations: None  Delusions: No  Memory:Normal: Yes  Insight and Judgment: No  Insight and Judgment: Poor Insight  Present Suicidal Ideation: Yes  Present Homicidal Ideation: No    Daily Assessment Last Entry:   Daily Sleep (WDL): Within Defined Limits         Patient Currently in Pain: No  Daily Nutrition (WDL): Exceptions to WDL  Appetite Change: Decreased  Barriers to Nutrition: Other (Comment)(sore on her mouth)  Level of Assistance: Independent/Self    Patient Monitoring:  Frequency of Checks: 4 times per hour, close    Psychiatric Symptoms:   Depression Symptoms  Depression Symptoms: Feelings of hopelessess, Loss of interest, Isolative, Change in energy level  Anxiety Symptoms  Anxiety Symptoms: No problems reported or observed. Mandi Symptoms  Mandi Symptoms: No problems reported or observed. Psychosis Symptoms  Delusion Type: No problems reported or observed. Suicide Risk CSSR-S:  1) Within the past month, have you wished you were dead or wished you could go to sleep and not wake up? : Yes  2) Have you actually had any thoughts of killing yourself? : Yes  3) Have you been thinking about how you might kill yourself? : Yes  5) Have you started to work out or worked out the details of how to kill yourself? Do you intend to carry out this plan? : Yes  6) Have you ever done anything, started to do anything, or prepared to do anything to end your life?: Yes  Change in Result: No Change in Plan of care: No      EDUCATION:   EDUCATION:   Learner Progress Toward Treatment Goals: Reviewed results and recommendations of this team, Reviewed group plan and strategies, Reviewed signs, symptoms and risk of self harm and violent behavior, Reviewed goals and plan of care    Method:small group, individual verbal education    Outcome: Patient refused to participate in treatment team meeting. Patient needs reinforcement to obtain goals.     PATIENT GOALS:  Short term: Patient refused to participate in

## 2019-07-23 LAB
CULTURE: NORMAL
CULTURE: NORMAL
Lab: NORMAL
Lab: NORMAL
SPECIMEN DESCRIPTION: NORMAL
SPECIMEN DESCRIPTION: NORMAL

## 2019-07-23 PROCEDURE — 6370000000 HC RX 637 (ALT 250 FOR IP): Performed by: NURSE PRACTITIONER

## 2019-07-23 PROCEDURE — 6370000000 HC RX 637 (ALT 250 FOR IP): Performed by: PSYCHIATRY & NEUROLOGY

## 2019-07-23 PROCEDURE — 1240000000 HC EMOTIONAL WELLNESS R&B

## 2019-07-23 PROCEDURE — 90833 PSYTX W PT W E/M 30 MIN: CPT | Performed by: PSYCHIATRY & NEUROLOGY

## 2019-07-23 PROCEDURE — 99232 SBSQ HOSP IP/OBS MODERATE 35: CPT | Performed by: PSYCHIATRY & NEUROLOGY

## 2019-07-23 RX ORDER — QUETIAPINE FUMARATE 100 MG/1
100 TABLET, FILM COATED ORAL 2 TIMES DAILY
Status: DISCONTINUED | OUTPATIENT
Start: 2019-07-23 | End: 2019-07-29 | Stop reason: HOSPADM

## 2019-07-23 RX ADMIN — OXCARBAZEPINE 300 MG: 300 TABLET, FILM COATED ORAL at 21:15

## 2019-07-23 RX ADMIN — OXCARBAZEPINE 300 MG: 300 TABLET, FILM COATED ORAL at 08:15

## 2019-07-23 RX ADMIN — QUETIAPINE FUMARATE 100 MG: 100 TABLET ORAL at 21:14

## 2019-07-23 RX ADMIN — SULFAMETHOXAZOLE AND TRIMETHOPRIM 1 TABLET: 800; 160 TABLET ORAL at 21:14

## 2019-07-23 RX ADMIN — TRAZODONE HYDROCHLORIDE 50 MG: 50 TABLET ORAL at 21:15

## 2019-07-23 RX ADMIN — CLONIDINE HYDROCHLORIDE 0.1 MG: 0.1 TABLET ORAL at 21:14

## 2019-07-23 RX ADMIN — SULFAMETHOXAZOLE AND TRIMETHOPRIM 1 TABLET: 800; 160 TABLET ORAL at 08:15

## 2019-07-23 RX ADMIN — VALACYCLOVIR 1000 MG: 500 TABLET, FILM COATED ORAL at 08:14

## 2019-07-23 RX ADMIN — VALACYCLOVIR 1000 MG: 500 TABLET, FILM COATED ORAL at 21:14

## 2019-07-23 RX ADMIN — QUETIAPINE FUMARATE 50 MG: 50 TABLET ORAL at 08:15

## 2019-07-23 NOTE — GROUP NOTE
Group Therapy Note    Date: July 23    Group Start Time: 1100  Group End Time: 3490  Group Topic: Psychotherapy    Via Karen Washington, MSW, LSW        Group Therapy Note    Attendees: 6/17    Patient declined to attend 11:00 am psychotherapy group even when encouraged by clinician.  1:1 refused

## 2019-07-24 PROCEDURE — 99232 SBSQ HOSP IP/OBS MODERATE 35: CPT | Performed by: PSYCHIATRY & NEUROLOGY

## 2019-07-24 PROCEDURE — 6370000000 HC RX 637 (ALT 250 FOR IP): Performed by: NURSE PRACTITIONER

## 2019-07-24 PROCEDURE — 1240000000 HC EMOTIONAL WELLNESS R&B

## 2019-07-24 PROCEDURE — 6370000000 HC RX 637 (ALT 250 FOR IP): Performed by: PSYCHIATRY & NEUROLOGY

## 2019-07-24 RX ADMIN — SULFAMETHOXAZOLE AND TRIMETHOPRIM 1 TABLET: 800; 160 TABLET ORAL at 08:21

## 2019-07-24 RX ADMIN — QUETIAPINE FUMARATE 100 MG: 100 TABLET ORAL at 08:21

## 2019-07-24 RX ADMIN — VALACYCLOVIR 1000 MG: 500 TABLET, FILM COATED ORAL at 21:01

## 2019-07-24 RX ADMIN — OXCARBAZEPINE 300 MG: 300 TABLET, FILM COATED ORAL at 21:01

## 2019-07-24 RX ADMIN — VALACYCLOVIR 1000 MG: 500 TABLET, FILM COATED ORAL at 12:14

## 2019-07-24 RX ADMIN — TRAZODONE HYDROCHLORIDE 50 MG: 50 TABLET ORAL at 21:01

## 2019-07-24 RX ADMIN — SULFAMETHOXAZOLE AND TRIMETHOPRIM 1 TABLET: 800; 160 TABLET ORAL at 21:01

## 2019-07-24 RX ADMIN — OXCARBAZEPINE 300 MG: 300 TABLET, FILM COATED ORAL at 08:21

## 2019-07-24 RX ADMIN — ACETAMINOPHEN 650 MG: 325 TABLET, FILM COATED ORAL at 21:01

## 2019-07-24 RX ADMIN — CLONIDINE HYDROCHLORIDE 0.1 MG: 0.1 TABLET ORAL at 21:01

## 2019-07-24 RX ADMIN — QUETIAPINE FUMARATE 100 MG: 100 TABLET ORAL at 21:01

## 2019-07-24 ASSESSMENT — PAIN DESCRIPTION - LOCATION: LOCATION: MOUTH

## 2019-07-24 ASSESSMENT — PAIN SCALES - GENERAL
PAINLEVEL_OUTOF10: 0
PAINLEVEL_OUTOF10: 3

## 2019-07-24 ASSESSMENT — PAIN DESCRIPTION - PAIN TYPE: TYPE: ACUTE PAIN

## 2019-07-24 NOTE — PROGRESS NOTES
Department of Psychiatry  Attending Physician Progress Note    Chief Complaint: depression and suicidal ideations    SUBJECTIVE:     The patient was feeling depressed. She has been reclusive and staying in bed with poor energy and feeling hopeless. She reported no change in her mood. She asked for a medicine for anxiety. She has not been able to take care of her 5 kids and is allowed to talk to the oldest 1 of her kids only. Her aunt takes care of 4 of them. She continues to endorse suicidal ideations. She denied any visual or auditory hallucinations. No withdrawal symptoms. There was no major side effects. There is no safe alternative other than the hospital treatment at this time. OBJECTIVE    Physical  VITALS:    BP (!) 114/58   Pulse 86   Temp 98.1 °F (36.7 °C) (Oral)   Resp 14   Ht 5' 4\" (1.626 m)   Wt 170 lb (77.1 kg)   LMP 06/28/2019 (Approximate)   SpO2 97%   BMI 29.18 kg/m²     Mental Status Examination:    Level of consciousness:  Within normal limits  Appearance: Street clothes, seated, with good grooming  Behavior/Motor: No abnormalities noted  Attitude toward examiner:  Cooperative, attentive, good eye contact  Speech:  spontaneous, normal rate, normal volume and well articulated  Mood:  Depressed   Affect:  Mood-congruent, constricted in range. Thought processes:  linear, goal-directed and coherent  Thought content:  denies homicidal ideation  Suicidal Ideation:  Positive for suicidal ideation  Delusions:  no evidence of delusions  Perceptual Disturbance:  No visual or auditory hallucinations. Cognition:  Intact  Memory: grossly intact.   Insight & Judgement: partial       Medications  Current Facility-Administered Medications: QUEtiapine (SEROQUEL) tablet 100 mg, 100 mg, Oral, BID  sulfamethoxazole-trimethoprim (BACTRIM DS;SEPTRA DS) 800-160 MG per tablet 1 tablet, 1 tablet, Oral, BID  valACYclovir (VALTREX) tablet 1,000 mg, 1,000 mg, Oral, BID  OXcarbazepine (TRILEPTAL) tablet 300 mg, 300 mg, Oral, BID  cloNIDine (CATAPRES) tablet 0.1 mg, 0.1 mg, Oral, Nightly  acetaminophen (TYLENOL) tablet 650 mg, 650 mg, Oral, Q4H PRN  traZODone (DESYREL) tablet 50 mg, 50 mg, Oral, Nightly PRN  benztropine mesylate (COGENTIN) injection 2 mg, 2 mg, Intramuscular, BID PRN  magnesium hydroxide (MILK OF MAGNESIA) 400 MG/5ML suspension 30 mL, 30 mL, Oral, Daily PRN  aluminum & magnesium hydroxide-simethicone (MAALOX) 200-200-20 MG/5ML suspension 30 mL, 30 mL, Oral, Q6H PRN    No results found for this or any previous visit (from the past 72 hour(s)). ASSESSMENT     Active Problems:    Bipolar disorder ([address]    Bipolar 1 disorder, depressed, severe ([address]  Resolved Problems:    * No resolved hospital problems. *      PLAN    · Continue oxcarbazepine,  · Increased Seroquel for mood stabilization. · Continue clonidine for insomnia  · About 17 minutes of supportive psychotherapy and psychoeducation were provided to the patient during the session including empathetic listening and validation of feelings. · Continue unit milieu and group psychotherapy.     Electronically Signed by Patrice Ghosh MD , 7/23/2019 10:30 PM

## 2019-07-24 NOTE — GROUP NOTE
Group Therapy Note    Date: July 24    Group Start Time: 0900  Group End Time: 1576  Group Topic: [address] [facility]    Patient did not attend [employer] and Goal Setting Group after encouragement from staff. 1:1 talk time offered but patient refused.       Signature:  Jeri Downey

## 2019-07-24 NOTE — PLAN OF CARE
Problem: Altered Mood, Depressive Behavior:  Goal: Able to verbalize and/or display a decrease in depressive symptoms  Description  Able to verbalize and/or display a decrease in depressive symptoms  7/23/2019 2129 by Rolanda Medina LPN  Note:   Denies suicidal and homicidal ideations at this time. Reports depression at a 5/10. Isolative to self and aloof of peers. Evasive and poor eye contact during 1:1 talk time. Problem: Altered Mood, Depressive Behavior:  Goal: Ability to disclose and discuss suicidal ideas will improve  Description  Ability to disclose and discuss suicidal ideas will improve  7/23/2019 2129 by Rolanda Medina LPN  Note:   Denies suicidal and homicidal ideations at this time. Problem: Pain:  Goal: Pain level will decrease  Description  Pain level will decrease  Note:   Denies pain at this time.

## 2019-07-24 NOTE — GROUP NOTE
Group Therapy Note    Date: July 24    Group Start Time: 1100  Group End Time: 2263  Group Topic: Cognitive Skills    [facility]    [place], [place]    Patient refused to attend Recreational Therapy Group at 1100 after encouragement from staff. 1:1 talk time offered.     Signature:  Eric Woods

## 2019-07-24 NOTE — GROUP NOTE
Group Therapy Note    Date: July 24    Group Start Time: 1430  Group End Time: 5405  Group Topic: Recovery    MITCH Velazco, CTRS    Patient did not attend Coping Skills and Recovery Group after encouragement from staff. 1:1 talk time offered but patient refused.       Signature:  Leonardo Phillips

## 2019-07-24 NOTE — CARE COORDINATION
This writer assessed pt's need/desire for assistance pursuing Recovery Housing or Inpatient treatment to meet her substance abuse recovery needs. Pt accepted services and stated, \"yeah, but not right now\". This writer acknowledged pt's request and will attempt assessment at later time.

## 2019-07-25 PROCEDURE — 6370000000 HC RX 637 (ALT 250 FOR IP): Performed by: NURSE PRACTITIONER

## 2019-07-25 PROCEDURE — 6370000000 HC RX 637 (ALT 250 FOR IP): Performed by: PSYCHIATRY & NEUROLOGY

## 2019-07-25 PROCEDURE — 99232 SBSQ HOSP IP/OBS MODERATE 35: CPT | Performed by: PSYCHIATRY & NEUROLOGY

## 2019-07-25 PROCEDURE — 1240000000 HC EMOTIONAL WELLNESS R&B

## 2019-07-25 RX ADMIN — SULFAMETHOXAZOLE AND TRIMETHOPRIM 1 TABLET: 800; 160 TABLET ORAL at 21:24

## 2019-07-25 RX ADMIN — QUETIAPINE FUMARATE 100 MG: 100 TABLET ORAL at 08:16

## 2019-07-25 RX ADMIN — QUETIAPINE FUMARATE 100 MG: 100 TABLET ORAL at 21:24

## 2019-07-25 RX ADMIN — OXCARBAZEPINE 300 MG: 300 TABLET, FILM COATED ORAL at 08:16

## 2019-07-25 RX ADMIN — VALACYCLOVIR 1000 MG: 500 TABLET, FILM COATED ORAL at 10:06

## 2019-07-25 RX ADMIN — CLONIDINE HYDROCHLORIDE 0.1 MG: 0.1 TABLET ORAL at 21:24

## 2019-07-25 RX ADMIN — VALACYCLOVIR 1000 MG: 500 TABLET, FILM COATED ORAL at 21:23

## 2019-07-25 RX ADMIN — TRAZODONE HYDROCHLORIDE 50 MG: 50 TABLET ORAL at 21:24

## 2019-07-25 RX ADMIN — OXCARBAZEPINE 300 MG: 300 TABLET, FILM COATED ORAL at 21:24

## 2019-07-25 RX ADMIN — SULFAMETHOXAZOLE AND TRIMETHOPRIM 1 TABLET: 800; 160 TABLET ORAL at 08:17

## 2019-07-25 NOTE — GROUP NOTE
Group Therapy Note    Date: July 25    Group Start Time: 0845  Group End Time: 6914  Group Topic: [address], [address]    Patient refused to attend [employer] at 0693 after encouragement from staff. 1:1 talk time offered.     Signature:  Claudette Keels

## 2019-07-25 NOTE — CARE COORDINATION
[facility] [address]  [address] [facility], [address]  Fax: [phone]  SYEDA on file  This writer spoke with Clotilde jorge, who stated;  -female bed availabilty at this time.  -will contact unit SW after completed application is received to schedule interview with Lloyd Olivia, at [facility]. ThiS writer faxed application and pt records to [facility], placed in chart.

## 2019-07-25 NOTE — PROGRESS NOTES
OBS/CDU   RESIDENT NOTE FOR INPATIENT STATUS      INPATIENT       The Patient Now Meets Inpatient Criteria as of 07/17/19 @ 2110 .     For the Following reasons:    [x]   Severity of Signs/ Symptoms indicate admission need   [x]   Medical Condition Would be Threatened in lower level of care   []   Diagnostic studies procedures results justify inpatient care   [x]   Adverse event likely if not inpatient admission   []   Pre-existing conditions warrants inpatient care     The patient requires inpatient care for further evaluation of their Cellulitis [L03.90]  Cellulitis [L03.90]

## 2019-07-25 NOTE — GROUP NOTE
Group Therapy Note    Date: July 25    Group Start Time: 1000  Group End Time: 8626  Group Topic: Psychotherapy    [facility] MSW, LSW        Group Therapy Note    Attendees: 8/18       Pt declined to attend psychotherapy at 1000 am despite encouragement. Pt offered 1:1 and refused.

## 2019-07-25 NOTE — GROUP NOTE
Group Therapy Note    Date: July 25    Group Start Time: 1100  Group End Time: 1833  Group Topic: Cognitive Skills    [facility] Valentin, [address]    Patient refused to attend Recreational Therapy Group at 1100 after encouragement from staff. 1:1 talk time offered.     Signature:  Real Nerissa

## 2019-07-25 NOTE — GROUP NOTE
Group Therapy Note    Date: July 25    Group Start Time: 1300  Group End Time: 1330  Group Topic: Community Resources MARIA R and Recovery     CHANO Chaudhary    Patient's Goal:  Demonstrate increased interpersonal interaction, discuss community resources    Notes:  Pt attended group and [facility] Status After Intervention:  Improved    Participation Level:  Active Listener and Interactive    Participation Quality: Appropriate and Attentive    Speech:  normal    Thought Process/Content: Logical    Affective Functioning: Congruent    Level of consciousness:  Alert, Oriented x4 and Attentive    Response to Learning: Able to verbalize current knowledge/experience, Able to verbalize/acknowledge new learning, Able to retain information, Capable of insight and Progressing to goal    Endings: None Reported    Modes of Intervention: Education, Support and Exploration    Discipline Responsible: Psychoeducational Specialist    Signature:  Mele Stevenson, [address]

## 2019-07-25 NOTE — CARE COORDINATION
PO: CHNATELLE GATICA     Writer received return call from PO and was informed pt has received new charge, probation violation d/t OD on 7/6/19. PO requested writer to inform pt of status, writer agreed. PO stated pt is able to enter inpatient if she chooses to but needs to keep PO informed. Writer and pt discussed information. Pt states, \"I didn't know I'd get a new charge for that\". Pt signed SYEDA for SOILA, writer contacted eMlia Lyn @[phone] and was informed pt's D[employer] officer is Emy Matute, no # for contact provided. Informed Emy Matute is out of town for training. DARVIN Wild states a DART officer will visit pt either today or tomorrow morning. Pt agreeable.     Writer will inform staff

## 2019-07-26 PROCEDURE — 1240000000 HC EMOTIONAL WELLNESS R&B

## 2019-07-26 PROCEDURE — 6370000000 HC RX 637 (ALT 250 FOR IP): Performed by: NURSE PRACTITIONER

## 2019-07-26 PROCEDURE — 99232 SBSQ HOSP IP/OBS MODERATE 35: CPT | Performed by: PSYCHIATRY & NEUROLOGY

## 2019-07-26 PROCEDURE — 6370000000 HC RX 637 (ALT 250 FOR IP): Performed by: PSYCHIATRY & NEUROLOGY

## 2019-07-26 RX ADMIN — OXCARBAZEPINE 300 MG: 300 TABLET, FILM COATED ORAL at 08:14

## 2019-07-26 RX ADMIN — VALACYCLOVIR 1000 MG: 500 TABLET, FILM COATED ORAL at 10:14

## 2019-07-26 RX ADMIN — TRAZODONE HYDROCHLORIDE 50 MG: 50 TABLET ORAL at 21:41

## 2019-07-26 RX ADMIN — SULFAMETHOXAZOLE AND TRIMETHOPRIM 1 TABLET: 800; 160 TABLET ORAL at 08:15

## 2019-07-26 RX ADMIN — CLONIDINE HYDROCHLORIDE 0.1 MG: 0.1 TABLET ORAL at 21:41

## 2019-07-26 RX ADMIN — QUETIAPINE FUMARATE 100 MG: 100 TABLET ORAL at 21:41

## 2019-07-26 RX ADMIN — VALACYCLOVIR 1000 MG: 500 TABLET, FILM COATED ORAL at 21:43

## 2019-07-26 RX ADMIN — SULFAMETHOXAZOLE AND TRIMETHOPRIM 1 TABLET: 800; 160 TABLET ORAL at 21:41

## 2019-07-26 RX ADMIN — OXCARBAZEPINE 300 MG: 300 TABLET, FILM COATED ORAL at 21:41

## 2019-07-26 RX ADMIN — QUETIAPINE FUMARATE 100 MG: 100 TABLET ORAL at 08:15

## 2019-07-26 NOTE — GROUP NOTE
Group Therapy Note    Date: July 26    Group Start Time: 1330  Group End Time: 2954  Group Topic: Healthy Living/Wellness    MITCH Javier, KAITYS        Group Therapy Note    Attendees: 6       Patient's Goal:  To increase interpersonal skills     Notes:  Patient attended group and participated fully     Status After Intervention:  Improved    Participation Level:  Active Listener and Interactive    Participation Quality: Appropriate, Attentive and Sharing      Speech:  normal      Thought Process/Content: Logical      Affective Functioning: Congruent      Mood: euthymic      Level of consciousness:  Alert and Oriented x4      Response to Learning: Progressing to goal      Endings: None Reported    Modes of Intervention: Education, Socialization, Activity and Movement      Discipline Responsible: Psychoeducational Specialist      Signature:  Wilmer Yo

## 2019-07-26 NOTE — PROGRESS NOTES
Department of Psychiatry  Attending Physician Progress Note    Chief Complaint: depression and suicidal ideations    SUBJECTIVE:     The patient reported feeling less depressed and anxious but continues to show signs of severe depression including psychomotor retardation, isolation and hopelessness about the future. There is no suicidal ideations today. She would like to be discharged to stay with an [place] [address] friend she goes to an inpatient recovery program on Monday. There was no major side effects. Complained of mild dry mouth  There is no safe alternative other than the hospital treatment at this time. OBJECTIVE    Physical  VITALS:    BP (!) 106/43   Pulse 71   Temp 98.2 °F (36.8 °C) (Oral)   Resp 14   Ht 5' 4\" (1.626 m)   Wt 170 lb (77.1 kg)   LMP 06/28/2019 (Approximate)   SpO2 97%   BMI 29.18 kg/m²     Mental Status Examination:    Level of consciousness:  Within normal limits  Appearance: Street clothes, seated, with good grooming  Behavior/Motor: No abnormalities noted  Attitude toward examiner:  Cooperative, attentive, good eye contact  Speech:  spontaneous, normal rate, normal volume and well articulated  Mood:  Depressed   Affect:  Mood-congruent, constricted in range. Thought processes:  linear, goal-directed and coherent  Thought content:  denies homicidal ideation  Suicidal Ideation:  Less frequent suicidal ideation  Delusions:  no evidence of delusions  Perceptual Disturbance:  No visual or auditory hallucinations. Cognition:  Intact  Memory: grossly intact.   Insight & Judgement: partial       Medications  Current Facility-Administered Medications: QUEtiapine (SEROQUEL) tablet 100 mg, 100 mg, Oral, BID  sulfamethoxazole-trimethoprim (BACTRIM DS;SEPTRA DS) 800-160 MG per tablet 1 tablet, 1 tablet, Oral, BID  valACYclovir (VALTREX) tablet 1,000 mg, 1,000 mg, Oral, BID  OXcarbazepine (TRILEPTAL) tablet 300 mg, 300 mg, Oral, BID  cloNIDine (CATAPRES) tablet 0.1 mg, 0.1 mg, Oral,

## 2019-07-26 NOTE — PLAN OF CARE
Problem: Altered Mood, Depressive Behavior:  Goal: Able to verbalize and/or display a decrease in depressive symptoms  Description  Able to verbalize and/or display a decrease in depressive symptoms  7/26/2019 0903 by Lu Avilez RN  Outcome: Ongoing     Problem: Altered Mood, Depressive Behavior:  Goal: Ability to disclose and discuss suicidal ideas will improve  Description  Ability to disclose and discuss suicidal ideas will improve  7/26/2019 0903 by Lu Avilez RN  Outcome: Ongoing     Problem: Suicide risk  Goal: Provide patient with safe environment  Description  Provide patient with safe environment  7/26/2019 0903 by Lu Avilez RN  Outcome: Ongoing     Patient is calm, controlled and medication compliant. Patient denies suicidal ideations but reports some feelings of depression and anxiety. Patient appears flat and is isolative to self/room but attends select groups. Patient reports eating and sleeping adequately with safety checks Q15min and at irregular intervals; patient safety is maintained.

## 2019-07-26 NOTE — GROUP NOTE
Group Therapy Note    Date: July 26    Group Start Time: 1430  Group End Time: 1520  Group Topic: Cognitive Skills    [facility] CHANO Del Valle    Patient's Goal:  Improve cognitive skills, demonstrate increased interpersonal interaction     Notes:  Pt attend group and participated in activity. Pt engaged in group discussion with staff and peers. Status After Intervention:  Improved    Participation Level:  Active Listener and Interactive    Participation Quality: Appropriate, Attentive and Sharing    Speech:  normal    Thought Process/Content: Logical    Affective Functioning: Congruent    Level of consciousness:  Alert, Oriented x4 and Attentive    Response to Learning: Able to verbalize current knowledge/experience, Able to verbalize/acknowledge new learning, Able to retain information, Capable of insight and Progressing to goal    Endings: None Reported    Modes of Intervention: Education, Socialization, Exploration, Problem-solving, Activity and Limit-setting    Discipline Responsible: Psychoeducational Specialist    Signature:  Stephane Schwarz [place]

## 2019-07-27 PROBLEM — F31.4 BIPOLAR 1 DISORDER, DEPRESSED, SEVERE (HCC): Status: RESOLVED | Noted: 2019-07-20 | Resolved: 2019-07-27

## 2019-07-27 PROCEDURE — 1240000000 HC EMOTIONAL WELLNESS R&B

## 2019-07-27 PROCEDURE — 99232 SBSQ HOSP IP/OBS MODERATE 35: CPT | Performed by: NURSE PRACTITIONER

## 2019-07-27 PROCEDURE — 90833 PSYTX W PT W E/M 30 MIN: CPT | Performed by: NURSE PRACTITIONER

## 2019-07-27 PROCEDURE — 6370000000 HC RX 637 (ALT 250 FOR IP): Performed by: NURSE PRACTITIONER

## 2019-07-27 PROCEDURE — 6370000000 HC RX 637 (ALT 250 FOR IP): Performed by: PSYCHIATRY & NEUROLOGY

## 2019-07-27 RX ORDER — CLONIDINE HYDROCHLORIDE 0.1 MG/1
0.1 TABLET ORAL NIGHTLY
Qty: 30 TABLET | Refills: 0 | Status: SHIPPED | OUTPATIENT
Start: 2019-07-27 | End: 2019-10-23

## 2019-07-27 RX ORDER — TRAZODONE HYDROCHLORIDE 50 MG/1
50 TABLET ORAL NIGHTLY PRN
Qty: 30 TABLET | Refills: 0 | Status: SHIPPED | OUTPATIENT
Start: 2019-07-27 | End: 2019-10-23

## 2019-07-27 RX ORDER — QUETIAPINE FUMARATE 100 MG/1
100 TABLET, FILM COATED ORAL 2 TIMES DAILY
Qty: 60 TABLET | Refills: 0 | Status: ON HOLD | OUTPATIENT
Start: 2019-07-27 | End: 2020-02-21 | Stop reason: HOSPADM

## 2019-07-27 RX ORDER — OXCARBAZEPINE 300 MG/1
300 TABLET, FILM COATED ORAL 2 TIMES DAILY
Qty: 60 TABLET | Refills: 0 | Status: SHIPPED | OUTPATIENT
Start: 2019-07-27 | End: 2019-10-23

## 2019-07-27 RX ADMIN — TRAZODONE HYDROCHLORIDE 50 MG: 50 TABLET ORAL at 22:01

## 2019-07-27 RX ADMIN — VALACYCLOVIR 1000 MG: 500 TABLET, FILM COATED ORAL at 22:00

## 2019-07-27 RX ADMIN — SULFAMETHOXAZOLE AND TRIMETHOPRIM 1 TABLET: 800; 160 TABLET ORAL at 08:24

## 2019-07-27 RX ADMIN — QUETIAPINE FUMARATE 100 MG: 100 TABLET ORAL at 08:24

## 2019-07-27 RX ADMIN — CLONIDINE HYDROCHLORIDE 0.1 MG: 0.1 TABLET ORAL at 22:00

## 2019-07-27 RX ADMIN — SULFAMETHOXAZOLE AND TRIMETHOPRIM 1 TABLET: 800; 160 TABLET ORAL at 22:01

## 2019-07-27 RX ADMIN — VALACYCLOVIR 1000 MG: 500 TABLET, FILM COATED ORAL at 08:24

## 2019-07-27 RX ADMIN — OXCARBAZEPINE 300 MG: 300 TABLET, FILM COATED ORAL at 22:01

## 2019-07-27 RX ADMIN — OXCARBAZEPINE 300 MG: 300 TABLET, FILM COATED ORAL at 08:24

## 2019-07-27 RX ADMIN — QUETIAPINE FUMARATE 100 MG: 100 TABLET ORAL at 22:01

## 2019-07-27 NOTE — PROGRESS NOTES
(SEROQUEL) tablet 100 mg, 100 mg, Oral, BID  sulfamethoxazole-trimethoprim (BACTRIM DS;SEPTRA DS) 800-160 MG per tablet 1 tablet, 1 tablet, Oral, BID  valACYclovir (VALTREX) tablet 1,000 mg, 1,000 mg, Oral, BID  OXcarbazepine (TRILEPTAL) tablet 300 mg, 300 mg, Oral, BID  cloNIDine (CATAPRES) tablet 0.1 mg, 0.1 mg, Oral, Nightly  acetaminophen (TYLENOL) tablet 650 mg, 650 mg, Oral, Q4H PRN  traZODone (DESYREL) tablet 50 mg, 50 mg, Oral, Nightly PRN  benztropine mesylate (COGENTIN) injection 2 mg, 2 mg, Intramuscular, BID PRN  magnesium hydroxide (MILK OF MAGNESIA) 400 MG/5ML suspension 30 mL, 30 mL, Oral, Daily PRN  aluminum & magnesium hydroxide-simethicone (MAALOX) 200-200-20 MG/5ML suspension 30 mL, 30 mL, Oral, Q6H PRN    No results found for this or any previous visit (from the past 72 hour(s)). ASSESSMENT     Principal Problem:    Bipolar 1 disorder, depressed, severe ([address]  Active Problems:    Bipolar disorder ([address]  Resolved Problems:    * No resolved hospital problems. *      PLAN  · Continue inpatient psychiatric treatment  · Supportive therapy with medication management. Reviewed risks and benefits as well as potential side effects with patient. · Review medications for efficacy and side effects. · Therapeutic support and empathetic care provided greater than 20 minutes. · Engage in therapeutic activities and groups. · Follow up at [facility] after symptoms stabilized. · The patient is improving but continues to show signs of depression that is related to her substance use. · Continue oxcarbazepine,  · Increased Seroquel for mood stabilization to 100 mg twice a day. · Continue clonidine and trazodone for insomnia. .  · Discharge planning for 7/29/19 to the [address] [place] [address].     Electronically Signed by TOMY Tran CNP , 7/27/2019 11:54 AM

## 2019-07-27 NOTE — GROUP NOTE
Group Therapy Note    Date: July 26    Group Start Time: 2030  Group End Time: 2045  Group Topic: Wrap-Up    [facility] JHONATAN Vogt RN        Group Therapy Note    Attendees: 13           Signature:  Janette Lawler RN

## 2019-07-27 NOTE — BH NOTE
Wellness Group Note     Date: July 27     Group Start Time: 4495  Group End Time: 2804  Group Topic: Coping Skills/Discharge Planning      MITCH Flores RN      Patient refused to attend Wellness Group at [phone] after encouragement from staff.  1:1 talk time offered.     Signature: Lily Flores RN

## 2019-07-27 NOTE — PLAN OF CARE
Problem: Altered Mood, Depressive Behavior:  Goal: Able to verbalize and/or display a decrease in depressive symptoms  Description  Able to verbalize and/or display a decrease in depressive symptoms  Outcome: Ongoing  Patient is alert, observed in room. Patient is flat on approach. Patient is currently denying suicidal thoughts, denies homicidal thought, admits to little anxiety and depression due to drug use. Reassurance and support given. Coping skills explored and discussed. Patient reports adequate sleep and appetite. Patient takes all medications without concern, denies any side effects. Patient spends most of day in room sleeping, does not attend unit programming, encouraged patient to attend unit programming, flat, unmotivated. When patient is out in day room, patient does socialize with select peers. Patient hygiene is poor, encouraged to shower and take care of self. Patient reports she has an appointment with [address] [place] [address] on Monday 7/29, discharge order is in for that day. No further concerns voiced. Will continue to monitor.

## 2019-07-28 PROCEDURE — 90833 PSYTX W PT W E/M 30 MIN: CPT | Performed by: NURSE PRACTITIONER

## 2019-07-28 PROCEDURE — 6370000000 HC RX 637 (ALT 250 FOR IP): Performed by: NURSE PRACTITIONER

## 2019-07-28 PROCEDURE — 6370000000 HC RX 637 (ALT 250 FOR IP): Performed by: PSYCHIATRY & NEUROLOGY

## 2019-07-28 PROCEDURE — 99232 SBSQ HOSP IP/OBS MODERATE 35: CPT | Performed by: NURSE PRACTITIONER

## 2019-07-28 PROCEDURE — 1240000000 HC EMOTIONAL WELLNESS R&B

## 2019-07-28 RX ADMIN — CLONIDINE HYDROCHLORIDE 0.1 MG: 0.1 TABLET ORAL at 22:27

## 2019-07-28 RX ADMIN — OXCARBAZEPINE 300 MG: 300 TABLET, FILM COATED ORAL at 22:27

## 2019-07-28 RX ADMIN — QUETIAPINE FUMARATE 100 MG: 100 TABLET ORAL at 22:27

## 2019-07-28 RX ADMIN — SULFAMETHOXAZOLE AND TRIMETHOPRIM 1 TABLET: 800; 160 TABLET ORAL at 08:52

## 2019-07-28 RX ADMIN — VALACYCLOVIR 1000 MG: 500 TABLET, FILM COATED ORAL at 22:26

## 2019-07-28 RX ADMIN — VALACYCLOVIR 1000 MG: 500 TABLET, FILM COATED ORAL at 08:52

## 2019-07-28 RX ADMIN — OXCARBAZEPINE 300 MG: 300 TABLET, FILM COATED ORAL at 08:52

## 2019-07-28 RX ADMIN — SULFAMETHOXAZOLE AND TRIMETHOPRIM 1 TABLET: 800; 160 TABLET ORAL at 22:27

## 2019-07-28 RX ADMIN — TRAZODONE HYDROCHLORIDE 50 MG: 50 TABLET ORAL at 22:27

## 2019-07-28 RX ADMIN — QUETIAPINE FUMARATE 100 MG: 100 TABLET ORAL at 08:51

## 2019-07-28 NOTE — GROUP NOTE
Group Therapy Note    Date: July 28    Group Start Time: 1000  Group End Time: 1100  Group Topic: Psychoeducation    JUNE Ding, SUKHJINDER        Group Therapy Note    patient refused to attend psychoeducational group at 10:00am after encouragement from staff.   1:1 offered, but refused       Signature:  JUNE Casillas, SUKHJINDER

## 2019-07-28 NOTE — PLAN OF CARE
[id number] [facility] Interdisciplinary Treatment Plan Note     Review Date & Time: 7/28/19 0925    Admission Type:   Admission Type: Voluntary    Reason for admission:  Reason for Admission: suicidal thoughts, depression, off meds    Estimated Length of Stay:  8-14 days  Estimated Discharge Date Update:   to be determined by physician    PATIENT STRENGTHS:  Patient Strengths:Strengths: Communication, Social Skills  Patient Strengths and Limitations:Limitations: Difficult relationships / poor social skills, Tendency to isolate self, Inappropriate/potentially harmful leisure interests, Difficulty problem solving/relies on others to help solve problems  Addictive Behavior:Addictive Behavior  In the past 3 months, have you felt or has someone told you that you have a problem with:  : None  Do you have a history of Chemical Use?: Yes  Do you have a history of Alcohol Use?: No  Do you have a history of Street Drug Abuse?: Yes  Histroy of Prescripton Drug Abuse?: No  Medical Problems:   Past Medical History:   Diagnosis Date    Abnormal Pap smear     Anemia     Anxiety     Asthma     Bipolar 1 disorder ([facility]     Depression     Drug abuse ([facility]     gets opiates from [address] per day    Hepatitis C     Kidney infection     Neurologic disorder     Postpartum depression     Seizures ([facility]     only when detoxing    Ulceration     to right foot       Risk:  Fall RiskTotal: 53  Guy Scale Guy Scale Score: 22  BVC Total: 0    Change in scores:  No. Changes to plan of Care:  No    Status EXAM:   Status and Exam  Normal: No  Facial Expression: Flat, Sad  Affect: Congruent  Level of Consciousness: Alert  Mood:Normal: No  Mood: Depressed, Anxious  Motor Activity:Normal: No  Motor Activity: Decreased  Interview Behavior: Cooperative, Evasive  Preception: [place] to Person, [place] to Time, [place] to Place, [place] to Situation  Attention:Normal: No  Attention: Distractible  Thought Processes: Blocking  Thought Content:Normal: No  Thought Content: Preoccupations, Poverty of Content  Hallucinations: None  Delusions: No  Memory:Normal: Yes  Insight and Judgment: No  Insight and Judgment: Poor Judgment, Unmotivated, Poor Insight  Present Suicidal Ideation: No  Present Homicidal Ideation: No    Daily Assessment Last Entry:   Daily Sleep (WDL): Within Defined Limits         Patient Currently in Pain: Denies  Daily Nutrition (WDL): Within Defined Limits  Appetite Change: Normal for patient  Barriers to Nutrition: None  Level of Assistance: Independent/Self    Patient Monitoring:  Frequency of Checks: 4 times per hour, close    Psychiatric Symptoms:   Depression Symptoms  Depression Symptoms: Feelings of helplessness, Feelings of hopelessess, Loss of interest, Impaired concentration  Anxiety Symptoms  Anxiety Symptoms: Generalized  Mandi Symptoms  Mandi Symptoms: No problems reported or observed. Psychosis Symptoms  Delusion Type: No problems reported or observed. Suicide Risk CSSR-S:  1) Within the past month, have you wished you were dead or wished you could go to sleep and not wake up? : Yes  2) Have you actually had any thoughts of killing yourself? : Yes  3) Have you been thinking about how you might kill yourself? : Yes  5) Have you started to work out or worked out the details of how to kill yourself? Do you intend to carry out this plan? : Yes  6) Have you ever done anything, started to do anything, or prepared to do anything to end your life?: Yes  Change in result:  no  Change in plan of care:  no    EDUCATION:   Learner Progress Toward Treatment Goals:   Pt refused treatment team meeting, Reviewed results and recommendations of this team, reviewed group plan and strategies, reviewed signs, symptoms and risk of self harm and violent behavior, reviewed goals and plan of care. Method:  individual education, small group, verbal education.     Outcome:  Pt refused treatment team meeting,  pt needs reinforcement to develop and obtain goals. PATIENT GOALS:  Short term:  Pt refused treatment team meeting  Long term:  Pt refused treatment team meeting    PLAN/TREATMENT RECOMMENDATIONS UPDATE:   Continue with group therapies, education of coping skills   Continue to monitor patient on unit. Medications provided/medication compliance by patient. Continue for plans to obtain long term goals after discharge.     SHORT-TERM GOALS UPDATE:  Time frame for Short-Term Goals:  8-14days     LONG-TERM GOALS UPDATE:  Time frame for Long-Term Goals:  6 months    Members Present in Team Meeting:     Duane Roses

## 2019-07-28 NOTE — GROUP NOTE
Group Therapy Note    Date: July 28    Group Start Time: 1430  Group End Time: 1510  Group Topic: Cognitive Skills    CHANO Anderson    Patient's Goal:  Improve cognitive skills, demonstrate increased interpersonal interaction     Notes:  Pt attended group, participated in activity and interacted with staff and peers. Status After Intervention:  Improved    Participation Level:  Active Listener and Interactive    Participation Quality: Appropriate, Attentive and Sharing    Speech:  normal    Thought Process/Content: Logical    Affective Functioning: Congruent    Level of consciousness:  Alert, Oriented x4 and Attentive    Response to Learning: Able to verbalize current knowledge/experience, Able to verbalize/acknowledge new learning, Able to retain information, Capable of insight and Progressing to goal    Endings: None Reported    Modes of Intervention: Education, Socialization, Exploration, Problem-solving, Activity and Limit-setting    Discipline Responsible: Psychoeducational Specialist    Signature:  Dany Christy, [address]

## 2019-07-29 VITALS
SYSTOLIC BLOOD PRESSURE: 98 MMHG | HEART RATE: 69 BPM | WEIGHT: 170 LBS | RESPIRATION RATE: 14 BRPM | BODY MASS INDEX: 29.02 KG/M2 | OXYGEN SATURATION: 97 % | HEIGHT: 64 IN | TEMPERATURE: 97.1 F | DIASTOLIC BLOOD PRESSURE: 51 MMHG

## 2019-07-29 PROCEDURE — 6370000000 HC RX 637 (ALT 250 FOR IP): Performed by: PSYCHIATRY & NEUROLOGY

## 2019-07-29 PROCEDURE — 99238 HOSP IP/OBS DSCHRG MGMT 30/<: CPT | Performed by: NURSE PRACTITIONER

## 2019-07-29 PROCEDURE — 6370000000 HC RX 637 (ALT 250 FOR IP): Performed by: NURSE PRACTITIONER

## 2019-07-29 PROCEDURE — 5130000000 HC BRIDGE APPOINTMENT

## 2019-07-29 RX ADMIN — QUETIAPINE FUMARATE 100 MG: 100 TABLET ORAL at 09:17

## 2019-07-29 RX ADMIN — OXCARBAZEPINE 300 MG: 300 TABLET, FILM COATED ORAL at 09:17

## 2019-07-29 RX ADMIN — VALACYCLOVIR 1000 MG: 500 TABLET, FILM COATED ORAL at 09:17

## 2019-07-29 RX ADMIN — SULFAMETHOXAZOLE AND TRIMETHOPRIM 1 TABLET: 800; 160 TABLET ORAL at 09:17

## 2019-07-29 NOTE — PLAN OF CARE
Problem: Altered Mood, Depressive Behavior:  Goal: Able to verbalize and/or display a decrease in depressive symptoms  Description  Able to verbalize and/or display a decrease in depressive symptoms  Outcome: Ongoing     Problem: Suicide risk  Goal: Provide patient with safe environment  Description  Provide patient with safe environment  Outcome: Ongoing    Patient denies suicidal ideations. Patient denies depression and anxiety. Patient denies hallucinations. Patient reports she is being discharged tomorrow and is hopeful about going to the [facility]. Patient is social with peers in day room throughout the evening. Patient is compliant with her medications. Patient attends group. Patient remains free from harm on the unit. Patient safety maintained q15 minute checks.

## 2019-07-29 NOTE — CARE COORDINATION
Name: Sam Carrillo    : 1990    Discharge Date: 2019    Primary Auth/Cert #: [id number]    Discharge Medications:      Medication List      START taking these medications    cloNIDine 0.1 MG tablet  Commonly known as:  CATAPRES  Take 1 tablet by mouth nightly  Notes to patient:  Blood pressure control     OXcarbazepine 300 MG tablet  Commonly known as:  TRILEPTAL  Take 1 tablet by mouth 2 times daily  Notes to patient:  Clear thoughts     traZODone 50 MG tablet  Commonly known as:  DESYREL  Take 1 tablet by mouth nightly as needed for Sleep  Notes to patient:  Difficulty sleeping        CHANGE how you take these medications    QUEtiapine 100 MG tablet  Commonly known as:  SEROQUEL  Take 1 tablet by mouth 2 times daily  What changed:    · medication strength  · how much to take  Notes to patient:  Clear thoughts         STOP taking these medications    ondansetron 4 MG tablet  Commonly known as:  ZOFRAN     sulfamethoxazole-trimethoprim 800-160 MG per tablet  Commonly known as:  BACTRIM DS;SEPTRA DS     valACYclovir 1 g tablet  Commonly known as:  VALTREX     VORTIoxetine 5 MG tablet  Commonly known as:  TRINTELLIX           Where to Get Your Medications      These medications were sent to [facility], [place] [address]    Phone:  [phone]   · cloNIDine 0.1 MG tablet  · OXcarbazepine 300 MG tablet  · QUEtiapine 100 MG tablet  · traZODone 50 MG tablet         Follow Up Appointment: [address] [place] [address]  P: [phone]  Go on 2019  Arrive to house by 10 am for admission.  Must have medication in [address] [employer]  [phone]    Go on 2019  Intake 9-12pm

## 2019-07-29 NOTE — PROGRESS NOTES
Group Therapy Note    Date: July 29    Group Start Time: 9387  Group End Time: 5837  Group Topic: Community Meeting    [facility], [facility]    Patient unable to attend Community Meeting Group at 2079 due to meeting with staff to complete paperwork before being discharged from unit.     Signature:  Rosas Eubanks

## 2019-07-29 NOTE — DISCHARGE SUMMARY
[address] PRACTITIONER  Patient ID:  Mel Lopez  [id number]  63 y.o.  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    Hepatitis C B19.20    Asthma J45.909    Thrombocytopenia D69.6    H/O 29 wk indicated  delivery O60.10X0    H/O CS x 2 Z98.891    HRP (high risk pregnancy) O09.90    RLTCS 17 M APG 8/9 Wt 7#12 Z98.891    Lost custody of children Z76.3    Opioid dependence with withdrawal ([facility] F11.23    Polysubstance dependence ([facility] F19.20    Bipolar I disorder, most recent episode depressed ([facility] F31.30    Suicidal ideation R45.851    Severe malnutrition ([address] E43    Cellulitis L03.90    Herpes simplex labialis B00.1    Bipolar disorder ([facility] F31.9        Admission Condition: poor    Discharged Condition: stable. Admission Hx: Mel Lopez is a 34 y.o. female who was admitted from the [facility] on a voluntary basis. She was admitted for suicidal thoughts and reports that she overdosed last week and 3 weeks ago intentionally on heroin. She has been feeling more depressed and is currently homeless. Today patient is verbalizing readiness for discharge. She remains flat, poorly reactive with minimal interaction with writer. She denies all including SI, HI, hallucinations, anxiety and depression. She will be going to the [address] [place] [address] at discharge. She has been medication compliant and has not been attending groups. Patient reports improved sleep and good appetite. Provider discussed importance of follow-up and medication compliance. Chart and medications reviewed.  Therapeutic

## 2019-08-09 ENCOUNTER — HOSPITAL ENCOUNTER (EMERGENCY)
Age: 29
Discharge: HOME | End: 2019-08-09
Attending: EMERGENCY MEDICINE
Payer: MEDICARE

## 2019-08-09 VITALS
RESPIRATION RATE: 12 BRPM | TEMPERATURE: 98.9 F | DIASTOLIC BLOOD PRESSURE: 71 MMHG | SYSTOLIC BLOOD PRESSURE: 99 MMHG | OXYGEN SATURATION: 94 % | HEART RATE: 81 BPM

## 2019-08-09 DIAGNOSIS — T50.901A ACCIDENTAL DRUG OVERDOSE, INITIAL ENCOUNTER: Primary | ICD-10-CM

## 2019-08-09 PROCEDURE — 96374 THER/PROPH/DIAG INJ IV PUSH: CPT

## 2019-08-09 PROCEDURE — 6370000000 HC RX 637 (ALT 250 FOR IP)

## 2019-08-09 PROCEDURE — 99284 EMERGENCY DEPT VISIT MOD MDM: CPT

## 2019-08-09 PROCEDURE — 6360000002 HC RX W HCPCS: Performed by: STUDENT IN AN ORGANIZED HEALTH CARE EDUCATION/TRAINING PROGRAM

## 2019-08-09 PROCEDURE — 96372 THER/PROPH/DIAG INJ SC/IM: CPT

## 2019-08-09 RX ORDER — NALOXONE HYDROCHLORIDE 0.4 MG/ML
0.4 INJECTION, SOLUTION INTRAMUSCULAR; INTRAVENOUS; SUBCUTANEOUS ONCE
Status: COMPLETED | OUTPATIENT
Start: 2019-08-09 | End: 2019-08-09

## 2019-08-09 RX ORDER — AMMONIA INHALANTS 0.04 G/.3ML
INHALANT RESPIRATORY (INHALATION)
Status: COMPLETED
Start: 2019-08-09 | End: 2019-08-09

## 2019-08-09 RX ADMIN — NALOXONE HYDROCHLORIDE 0.4 MG: 0.4 INJECTION, SOLUTION INTRAMUSCULAR; INTRAVENOUS; SUBCUTANEOUS at 02:52

## 2019-08-09 RX ADMIN — NALOXONE HYDROCHLORIDE 0.4 MG: 0.4 INJECTION, SOLUTION INTRAMUSCULAR; INTRAVENOUS; SUBCUTANEOUS at 02:14

## 2019-08-09 RX ADMIN — AMMONIA INHALANTS: 0.04 INHALANT RESPIRATORY (INHALATION) at 06:53

## 2019-08-09 ASSESSMENT — ENCOUNTER SYMPTOMS
SORE THROAT: 0
TROUBLE SWALLOWING: 0
ABDOMINAL PAIN: 0
WHEEZING: 0
SHORTNESS OF BREATH: 0
VOMITING: 0
COUGH: 0
BACK PAIN: 0
PHOTOPHOBIA: 0
NAUSEA: 0
CHEST TIGHTNESS: 0

## 2019-08-09 ASSESSMENT — PAIN DESCRIPTION - LOCATION: LOCATION: BACK

## 2019-08-09 ASSESSMENT — PAIN SCALES - GENERAL: PAINLEVEL_OUTOF10: 10

## 2019-08-09 ASSESSMENT — PAIN DESCRIPTION - PAIN TYPE: TYPE: ACUTE PAIN

## 2019-08-09 NOTE — ED NOTES
Patient arouses when you say her name and says that she is trying stay awake but drifts right back to sleep      Marjorie Yoon, PARMINDER  08/09/19 7698

## 2019-08-09 NOTE — ED NOTES
Patient brought in by EMS, Patient found unresponsive and apneic at a hotel room. Bystander performed CPR and police administered 6 mg narcan. Patient states that she doesn't remember the entire day. Patient uncooperative at answering questions . Patient A&Ox 4 now.        Pari Sanchez RN  08/09/19 0690

## 2019-08-09 NOTE — ED TRIAGE NOTES
Pt was brought in by [facility] ambulance pt was found by a bystander who started cpr, pt was givne 6mg of narcan intra nasal by police. Pt was responsive and combative. Pt arrived alert and oriented. Pt connected to cardiac monitor.   at bedside

## 2019-08-09 NOTE — ED NOTES
Narcan given . Patient yelled at nurse then drifted back to sleep.      Bonnie Macedo RN  08/09/19 1703

## 2019-10-23 ENCOUNTER — OFFICE VISIT (OUTPATIENT)
Dept: OBGYN CLINIC | Age: 29
End: 2019-10-23
Payer: MEDICARE

## 2019-10-23 ENCOUNTER — HOSPITAL ENCOUNTER (OUTPATIENT)
Age: 29
Setting detail: SPECIMEN
Discharge: HOME | End: 2019-10-23
Payer: MEDICARE

## 2019-10-23 VITALS
RESPIRATION RATE: 18 BRPM | BODY MASS INDEX: 31.14 KG/M2 | DIASTOLIC BLOOD PRESSURE: 74 MMHG | SYSTOLIC BLOOD PRESSURE: 112 MMHG | WEIGHT: 181.4 LBS | HEART RATE: 92 BPM

## 2019-10-23 DIAGNOSIS — N76.0 ACUTE VAGINITIS: ICD-10-CM

## 2019-10-23 DIAGNOSIS — Z30.013 ENCOUNTER FOR PRESCRIPTION FOR DEPO-PROVERA: ICD-10-CM

## 2019-10-23 DIAGNOSIS — Z01.419 WELL WOMAN EXAM: Primary | ICD-10-CM

## 2019-10-23 PROCEDURE — 99395 PREV VISIT EST AGE 18-39: CPT | Performed by: SPECIALIST

## 2019-10-23 PROCEDURE — G8484 FLU IMMUNIZE NO ADMIN: HCPCS | Performed by: SPECIALIST

## 2019-10-23 RX ORDER — METRONIDAZOLE 500 MG/1
500 TABLET ORAL 2 TIMES DAILY
Qty: 14 TABLET | Refills: 0 | Status: SHIPPED | OUTPATIENT
Start: 2019-10-23 | End: 2019-10-30

## 2019-10-23 RX ORDER — MEDROXYPROGESTERONE ACETATE 150 MG/ML
150 INJECTION, SUSPENSION INTRAMUSCULAR
Qty: 1 EACH | Refills: 2 | Status: SHIPPED | OUTPATIENT
Start: 2019-10-23 | End: 2020-09-17

## 2019-10-23 RX ORDER — FLUCONAZOLE 100 MG/1
100 TABLET ORAL DAILY
Qty: 7 TABLET | Refills: 0 | Status: SHIPPED | OUTPATIENT
Start: 2019-10-23 | End: 2019-10-30

## 2019-10-23 RX ORDER — GABAPENTIN 100 MG/1
CAPSULE ORAL
Refills: 0 | Status: ON HOLD | COMMUNITY
Start: 2019-10-21 | End: 2020-02-21 | Stop reason: HOSPADM

## 2019-10-23 ASSESSMENT — ENCOUNTER SYMPTOMS
ABDOMINAL DISTENTION: 0
APNEA: 0
COUGH: 0
DIARRHEA: 0
VOMITING: 0
ABDOMINAL PAIN: 0
NAUSEA: 0
CONSTIPATION: 0
EYE PAIN: 0

## 2019-10-25 ENCOUNTER — NURSE ONLY (OUTPATIENT)
Dept: OBGYN CLINIC | Age: 29
End: 2019-10-25
Payer: MEDICARE

## 2019-10-25 DIAGNOSIS — Z30.42 ENCOUNTER FOR DEPO-PROVERA CONTRACEPTION: Primary | ICD-10-CM

## 2019-10-25 LAB
CONTROL: NORMAL
PREGNANCY TEST URINE, POC: NORMAL

## 2019-10-25 PROCEDURE — 81025 URINE PREGNANCY TEST: CPT | Performed by: SPECIALIST

## 2019-10-25 PROCEDURE — 96372 THER/PROPH/DIAG INJ SC/IM: CPT | Performed by: SPECIALIST

## 2019-10-25 RX ORDER — MEDROXYPROGESTERONE ACETATE 150 MG/ML
150 INJECTION, SUSPENSION INTRAMUSCULAR ONCE
Status: COMPLETED | OUTPATIENT
Start: 2019-10-25 | End: 2019-10-25

## 2019-10-25 RX ADMIN — MEDROXYPROGESTERONE ACETATE 150 MG: 150 INJECTION, SUSPENSION INTRAMUSCULAR at 13:20

## 2019-10-28 LAB — CYTOLOGY REPORT: NORMAL

## 2019-11-06 ENCOUNTER — NURSE ONLY (OUTPATIENT)
Dept: OBGYN CLINIC | Age: 29
End: 2019-11-06
Payer: MEDICARE

## 2019-11-06 DIAGNOSIS — Z30.42 ENCOUNTER FOR DEPO-PROVERA CONTRACEPTION: ICD-10-CM

## 2019-11-06 DIAGNOSIS — N92.6 IRREGULAR BLEEDING: Primary | ICD-10-CM

## 2019-11-06 LAB
CONTROL: NORMAL
PREGNANCY TEST URINE, POC: NEGATIVE

## 2019-11-06 PROCEDURE — 81025 URINE PREGNANCY TEST: CPT | Performed by: SPECIALIST

## 2019-12-04 ENCOUNTER — APPOINTMENT (OUTPATIENT)
Dept: CT IMAGING | Age: 29
End: 2019-12-04
Payer: MEDICARE

## 2019-12-04 ENCOUNTER — HOSPITAL ENCOUNTER (EMERGENCY)
Age: 29
Discharge: HOME | End: 2019-12-04
Attending: EMERGENCY MEDICINE
Payer: MEDICARE

## 2019-12-04 VITALS
OXYGEN SATURATION: 98 % | TEMPERATURE: 98.9 F | SYSTOLIC BLOOD PRESSURE: 133 MMHG | HEART RATE: 81 BPM | HEIGHT: 64 IN | WEIGHT: 198 LBS | RESPIRATION RATE: 16 BRPM | BODY MASS INDEX: 33.8 KG/M2 | DIASTOLIC BLOOD PRESSURE: 82 MMHG

## 2019-12-04 DIAGNOSIS — L73.9 FOLLICULITIS: Primary | ICD-10-CM

## 2019-12-04 DIAGNOSIS — S09.90XA CLOSED HEAD INJURY, INITIAL ENCOUNTER: ICD-10-CM

## 2019-12-04 LAB — HCG(URINE) PREGNANCY TEST: NEGATIVE

## 2019-12-04 PROCEDURE — 70486 CT MAXILLOFACIAL W/O DYE: CPT

## 2019-12-04 PROCEDURE — 81025 URINE PREGNANCY TEST: CPT

## 2019-12-04 PROCEDURE — 70450 CT HEAD/BRAIN W/O DYE: CPT

## 2019-12-04 PROCEDURE — 99282 EMERGENCY DEPT VISIT SF MDM: CPT

## 2019-12-04 RX ORDER — DOXYCYCLINE HYCLATE 100 MG
100 TABLET ORAL 2 TIMES DAILY
Qty: 20 TABLET | Refills: 0 | Status: SHIPPED | OUTPATIENT
Start: 2019-12-04 | End: 2019-12-14

## 2019-12-04 ASSESSMENT — PAIN SCALES - GENERAL: PAINLEVEL_OUTOF10: 8

## 2020-02-15 ENCOUNTER — HOSPITAL ENCOUNTER (INPATIENT)
Age: 30
LOS: 6 days | Discharge: HOME | DRG: 753 | End: 2020-02-21
Attending: EMERGENCY MEDICINE | Admitting: PSYCHIATRY & NEUROLOGY
Payer: MEDICARE

## 2020-02-15 PROBLEM — F33.9 MAJOR DEPRESSIVE DISORDER, RECURRENT (HCC): Status: ACTIVE | Noted: 2020-02-15

## 2020-02-15 PROCEDURE — 6370000000 HC RX 637 (ALT 250 FOR IP): Performed by: PSYCHIATRY & NEUROLOGY

## 2020-02-15 PROCEDURE — 99285 EMERGENCY DEPT VISIT HI MDM: CPT

## 2020-02-15 PROCEDURE — 6370000000 HC RX 637 (ALT 250 FOR IP): Performed by: EMERGENCY MEDICINE

## 2020-02-15 PROCEDURE — 90792 PSYCH DIAG EVAL W/MED SRVCS: CPT | Performed by: PSYCHIATRY & NEUROLOGY

## 2020-02-15 PROCEDURE — 1240000000 HC EMOTIONAL WELLNESS R&B

## 2020-02-15 RX ORDER — TRAZODONE HYDROCHLORIDE 50 MG/1
50 TABLET ORAL NIGHTLY PRN
Status: DISCONTINUED | OUTPATIENT
Start: 2020-02-15 | End: 2020-02-21 | Stop reason: HOSPADM

## 2020-02-15 RX ORDER — IBUPROFEN 400 MG/1
400 TABLET ORAL EVERY 6 HOURS PRN
Status: DISCONTINUED | OUTPATIENT
Start: 2020-02-15 | End: 2020-02-16

## 2020-02-15 RX ORDER — GABAPENTIN 300 MG/1
300 CAPSULE ORAL 3 TIMES DAILY
Status: DISCONTINUED | OUTPATIENT
Start: 2020-02-15 | End: 2020-02-21 | Stop reason: HOSPADM

## 2020-02-15 RX ORDER — PROMETHAZINE HYDROCHLORIDE 12.5 MG/1
12.5 TABLET ORAL EVERY 6 HOURS PRN
Status: DISCONTINUED | OUTPATIENT
Start: 2020-02-15 | End: 2020-02-21 | Stop reason: HOSPADM

## 2020-02-15 RX ORDER — ACETAMINOPHEN 325 MG/1
650 TABLET ORAL EVERY 4 HOURS PRN
Status: DISCONTINUED | OUTPATIENT
Start: 2020-02-15 | End: 2020-02-21 | Stop reason: HOSPADM

## 2020-02-15 RX ORDER — DICYCLOMINE HCL 20 MG
20 TABLET ORAL
Status: DISCONTINUED | OUTPATIENT
Start: 2020-02-15 | End: 2020-02-16

## 2020-02-15 RX ORDER — LOPERAMIDE HYDROCHLORIDE 2 MG/1
2 CAPSULE ORAL 4 TIMES DAILY PRN
Status: DISCONTINUED | OUTPATIENT
Start: 2020-02-15 | End: 2020-02-21 | Stop reason: HOSPADM

## 2020-02-15 RX ORDER — ONDANSETRON 4 MG/1
4 TABLET, ORALLY DISINTEGRATING ORAL ONCE
Status: COMPLETED | OUTPATIENT
Start: 2020-02-15 | End: 2020-02-15

## 2020-02-15 RX ORDER — HYDROXYZINE HYDROCHLORIDE 25 MG/1
25 TABLET, FILM COATED ORAL 3 TIMES DAILY PRN
Status: DISCONTINUED | OUTPATIENT
Start: 2020-02-15 | End: 2020-02-21 | Stop reason: HOSPADM

## 2020-02-15 RX ORDER — QUETIAPINE FUMARATE 100 MG/1
100 TABLET, FILM COATED ORAL DAILY
Status: DISCONTINUED | OUTPATIENT
Start: 2020-02-16 | End: 2020-02-16

## 2020-02-15 RX ORDER — QUETIAPINE FUMARATE 300 MG/1
300 TABLET, FILM COATED ORAL NIGHTLY
Status: DISCONTINUED | OUTPATIENT
Start: 2020-02-15 | End: 2020-02-21 | Stop reason: HOSPADM

## 2020-02-15 RX ADMIN — ONDANSETRON 4 MG: 4 TABLET, ORALLY DISINTEGRATING ORAL at 01:50

## 2020-02-15 RX ADMIN — GABAPENTIN 300 MG: 300 CAPSULE ORAL at 23:34

## 2020-02-15 RX ADMIN — QUETIAPINE FUMARATE 300 MG: 300 TABLET ORAL at 23:34

## 2020-02-15 RX ADMIN — HYDROXYZINE HYDROCHLORIDE 25 MG: 25 TABLET, FILM COATED ORAL at 23:34

## 2020-02-15 RX ADMIN — PROMETHAZINE HYDROCHLORIDE 12.5 MG: 12.5 TABLET ORAL at 18:10

## 2020-02-15 RX ADMIN — GABAPENTIN 300 MG: 300 CAPSULE ORAL at 15:23

## 2020-02-15 ASSESSMENT — ENCOUNTER SYMPTOMS
NAUSEA: 0
ABDOMINAL PAIN: 0
COUGH: 0
VOMITING: 0
SINUS PAIN: 0
SHORTNESS OF BREATH: 0
SINUS PRESSURE: 0
DIARRHEA: 0

## 2020-02-15 ASSESSMENT — SLEEP AND FATIGUE QUESTIONNAIRES
DO YOU USE A SLEEP AID: NO
AVERAGE NUMBER OF SLEEP HOURS: 8
DO YOU HAVE DIFFICULTY SLEEPING: NO

## 2020-02-15 ASSESSMENT — LIFESTYLE VARIABLES
HISTORY_ALCOHOL_USE: NO
HISTORY_ALCOHOL_USE: NO

## 2020-02-15 ASSESSMENT — PATIENT HEALTH QUESTIONNAIRE - PHQ9: SUM OF ALL RESPONSES TO PHQ QUESTIONS 1-9: 5

## 2020-02-15 ASSESSMENT — PAIN SCALES - GENERAL: PAINLEVEL_OUTOF10: 0

## 2020-02-15 NOTE — GROUP NOTE
Group Therapy Note    Date: 2/15/2020    Group Start Time: 1000  Group End Time: 7340  Group Topic: Psychoeducation    MITCH Olsen, MSW, LSW        Group Therapy Note    Attendees: 4/17    Pt declined to attend psycho education at 1000 am despite encouragement. Pt offered 1:1 and refused.

## 2020-02-15 NOTE — ED PROVIDER NOTES
[facility] ED  Emergency Department Encounter  Emergency Medicine Attending Note     Pt Name: Melissa Mckeon  MRN: [id number]  [place] 1990   Date of evaluation: 2/15/2020  PCP:  No primary care provider on file. CHIEF COMPLAINT       Chief Complaint   Patient presents with    Psychiatric Evaluation    Suicidal       HISTORY OF PRESENT ILLNESS  (Location/Symptom, Timing/Onset, Context/Setting, Quality, Duration, Modifying Factors, Severity.)      Melissa Mckeon is a 34 y.o. female who presents with suicidal ideations no specific plan. No homicidal ideations. No hallucinations. No current medical complaints. Patient states that she is been off her medications, and that she is concerned that she may be a harm to herself or others. But does not have any significant homicidal ideations or anyone that she is having thoughts against.  States that she did not realize how bad things were until they very started out of control. She does state that she used fentanyl that she snorted approximately 6 hours ago. PAST MEDICAL / SURGICAL / SOCIAL / FAMILY HISTORY     Past Medical History:  has a past medical history of Abnormal Pap smear, Anemia, Anxiety, Asthma, Bipolar 1 disorder ([address] Depression, Dialysis patient ([address] Drug abuse ([facility] [address] Hepatitis C, Kidney infection, Neurologic disorder, Postpartum depression, Seizures ([address] and Ulceration. Past Surgical History:  has a past surgical history that includes LEEP (2008); pr  delivery only (10-21-14); Abdomen surgery; and  section (N/A, 2017). Allergies:  Penicillins and Buspirone     Home Meds:   Prior to Visit Medications    Medication Sig Taking?  Authorizing Provider   TRAZODONE HCL PO Take by mouth Pt unable to recall dosage and strength Yes Historical Provider, MD   gabapentin (NEURONTIN) 100 MG capsule TAKE 1 CAPSULE BY MOUTH THREE TIMES DAILY Yes Historical Provider, MD   medroxyPROGESTERone (DEPO-PROVERA) 150 MG/ML injection Inject 1 mL into the muscle every 3 months for 1 dose Yes Javier Mckeon MD   QUEtiapine (SEROQUEL) 100 MG tablet Take 1 tablet by mouth 2 times daily Yes TOMY Barriga - CNP     Please note that medications prescribed at discharge will auto-populate into this medication list when note is refreshed. Please look at prescription date andprescriber to clarify. Family History:family history is not on file. Social History: She reports that she has never smoked. She has never used smokeless tobacco. She reports previous drug use. Drug: Cocaine. She reports that she does not drink alcohol. She reports being sexually active and has had partner(s) who are Male. REVIEW OF SYSTEMS    (2-9 systems for level 4, 10 or more for level 5)      Review of Systems   Constitutional: Negative for chills and fever. HENT: Negative for congestion, sinus pressure and sinus pain. Respiratory: Negative for cough and shortness of breath. Cardiovascular: Negative for chest pain and palpitations. Gastrointestinal: Negative for abdominal pain, diarrhea, nausea and vomiting. Genitourinary: Negative for difficulty urinating and dysuria. Musculoskeletal: Negative for arthralgias and myalgias. Skin: Negative for rash and wound. Neurological: Negative for dizziness, weakness, numbness and headaches. Psychiatric/Behavioral: Positive for dysphoric mood and suicidal ideas. Negative for hallucinations and self-injury. The patient is not nervous/anxious. PHYSICAL EXAM   (up to 7 for level 4, 8 or more for level 5)      Initial Vitals   ED Triage Vitals [02/15/20 0110]   BP Temp Temp Source Pulse Resp SpO2 Height Weight   138/62 99.2 °F (37.3 °C) Oral 118 18 95 % 5' 4\" (1.626 m) 195 lb (88.5 kg)       Physical Exam  Vitals signs and nursing note reviewed. Constitutional:       General: She is not in acute distress. Appearance: She is well-developed. She is not diaphoretic. HENT:      Head: Normocephalic and atraumatic. Eyes:      Pupils: Pupils are equal, round, and reactive to light. Neck:      Musculoskeletal: Normal range of motion and neck supple. Cardiovascular:      Rate and Rhythm: Normal rate and regular rhythm. Heart sounds: No murmur. No friction rub. No gallop. Pulmonary:      Effort: Pulmonary effort is normal. No respiratory distress. Breath sounds: Normal breath sounds. No wheezing or rales. Abdominal:      General: There is no distension. Palpations: Abdomen is soft. Tenderness: There is no abdominal tenderness. Musculoskeletal: Normal range of motion. General: No deformity. Skin:     General: Skin is warm and dry. Neurological:      General: No focal deficit present. Mental Status: She is alert and oriented to person, place, and time. Psychiatric:         Mood and Affect: Mood is depressed. Speech: Speech normal.         Thought Content: Thought content is not paranoid. Thought content includes suicidal ideation. Thought content does not include homicidal ideation. Thought content does not include homicidal or suicidal plan. Comments: She does look off into space occasionally, but does not appear to be drowsy. DIFFERENTIAL DIAGNOSIS/IMPRESSION     DDX: suicidal ideation, depression     Impression: 34 y.o. female who presents with suicidal ideation and depression. Been off her medications. Not homicidal, but states that she is worried if she does not get help that she can get to that point. Does admit to using fentanyl earlier today. However does not appear to be drowsy, but is staring off into space. Appears to be more that she is responding to internal stimuli. She has no medical complaints. Exam is otherwise unremarkable.   Will watch her about 1 hour to make sure that she does not have any signs of drowsiness or respiratory depression, however this is unlikely given the time that she

## 2020-02-15 NOTE — PLAN OF CARE
[facility]  Initial Interdisciplinary Treatment Plan NO      Original treatment plan Date & Time: 2/15/2020 0804    Admission Type:  Admission Type: Voluntary    Reason for admission:   Reason for Admission: abuses Fentanyl, wants to get back on med's, depression    Estimated Length of Stay:  5-7days  Estimated Discharge Date: to be determined by physician    PATIENT STRENGTHS:  Patient Strengths:Strengths: Communication, Connection to output provider  Patient Strengths and Limitations:Limitations: Inappropriate/potentially harmful leisure interests, Difficulty problem solving/relies on others to help solve problems  Addictive Behavior: Addictive Behavior  In the past 3 months, have you felt or has someone told you that you have a problem with:  : None  Do you have a history of Chemical Use?: No  Do you have a history of Alcohol Use?: No  Do you have a history of Street Drug Abuse?: Yes  Histroy of Prescripton Drug Abuse?: No  Medical Problems:  Past Medical History:   Diagnosis Date    Abnormal Pap smear     Anemia     Anxiety     Asthma     Bipolar 1 disorder ([facility] [address]     Depression     Dialysis patient ([facility] [address]     Pt taken off approx 10/1/2019    Drug abuse ([facility] [address]     gets opiates from [address] per day    Hepatitis C     Kidney infection     Neurologic disorder     Postpartum depression     Seizures ([facility] [address]     only when detoxing    Ulceration     to right foot     Status EXAM:Status and Exam  Normal: Yes  Facial Expression: Flat  Affect: Congruent  Level of Consciousness: Alert  Mood:Normal: No  Mood: Depressed, Anxious  Motor Activity:Normal: Yes  Interview Behavior: Cooperative  Attention:Normal: Yes  Thought Content:Normal: Yes  Hallucinations: None  Delusions: No  Memory:Normal: Yes  Insight and Judgment: No  Insight and Judgment: Poor Judgment, Poor Insight  Present Suicidal Ideation: No  Present Homicidal Ideation: No    EDUCATION:   Learner Progress Toward Treatment Goals: reviewed group plans and strategies for care    Method:group therapy, medication compliance, individualized assessments and care planning    Outcome: needs reinforcement    PATIENT GOALS: to be discussed with patient within 72 hours    PLAN/TREATMENT RECOMMENDATIONS:     continue group therapy , medications compliance, goal setting, individualized assessments and care, continue to monitor pt on unit      SHORT-TERM GOALS:   Time frame for Short-Term Goals: 5-7 days    LONG-TERM GOALS:  Time frame for Long-Term Goals: 6 months  Members Present in Team Meeting: See Signature Sheet    Stephane Gaona

## 2020-02-15 NOTE — ED NOTES
Pt feeling well enough to eat at this time. Pt sitting in chair eating without difficulty. Safeguard direct line of sight.       Edwin Tran RN  02/15/20 0694

## 2020-02-15 NOTE — ED NOTES
Paperwork and pt's belongings provided to [facility] staff. Pt escorted to [facility] via two [facility] staff members. Pt cooperative exiting [facility].

## 2020-02-15 NOTE — PROGRESS NOTES
`[facility]  Admission Note     Admission Type:   Admission Type: Voluntary    Reason for admission:  Reason for Admission: abuses Fentanyl, wants to get back on med's, depression    PATIENT STRENGTHS:  Strengths: Communication, Connection to output provider    Patient Strengths and Limitations:  Limitations: Inappropriate/potentially harmful leisure interests, Difficulty problem solving/relies on others to help solve problems    Addictive Behavior:   Addictive Behavior  In the past 3 months, have you felt or has someone told you that you have a problem with:  : None  Do you have a history of Chemical Use?: No  Do you have a history of Alcohol Use?: No  Do you have a history of Street Drug Abuse?: Yes  Histroy of Prescripton Drug Abuse?: No    Medical Problems:   Past Medical History:   Diagnosis Date    Abnormal Pap smear     Anemia     Anxiety     Asthma     Bipolar 1 disorder ([facility] [address]     Depression     Dialysis patient ([facility] [address]     Pt taken off approx 10/1/2019    Drug abuse ([facility] [address]     gets opiates from [address] per day    Hepatitis C     Kidney infection     Neurologic disorder     Postpartum depression     Seizures ([facility]     only when detoxing    Ulceration     to right foot       Status EXAM:  Status and Exam  Normal: Yes  Facial Expression: Flat  Affect: Congruent  Level of Consciousness: Alert  Mood:Normal: No  Mood: Depressed, Anxious  Motor Activity:Normal: Yes  Interview Behavior: Cooperative  Attention:Normal: Yes  Thought Content:Normal: Yes  Hallucinations: None  Delusions: No  Memory:Normal: Yes  Insight and Judgment: No  Insight and Judgment: Poor Judgment, Poor Insight  Present Suicidal Ideation: No  Present Homicidal Ideation: No    Tobacco Screening:  Practical Counseling, on admission, li X, if applicable and completed (first 3 are required if patient doesn't refuse):            ( )  Recognizing danger situations (included triggers and roadblocks) ( )  Coping skills (new ways to manage stress, exercise, relaxation techniques, changing routine, distraction)                                                           ( )  Basic information about quitting (benefits of quitting, techniques in how to quit, available resources  ( ) Referral for counseling faxed to [facility]                                           ( ) Patient refused counseling  ( ) Patient has not smoked in the last 30 days    Metabolic Screening:    No results found for: LABA1C    Lab Results   Component Value Date    CHOL 225 (H) 12/12/2018     Lab Results   Component Value Date    TRIG 189 (H) 12/12/2018     Lab Results   Component Value Date    HDL 64 12/12/2018     No components found for: LDLCAL  No results found for: LABVLDL      Body mass index is 33.3 kg/m². BP Readings from Last 2 Encounters:   02/15/20 114/68   12/04/19 133/82           Pt admitted with followings belongings:  Dentures: None  Vision - Corrective Lenses: None  Hearing Aid: None  Jewelry: Bracelet, Earrings  Body Piercings Removed: No  Clothing: Footwear, Jacket / coat, Pants, Undergarments (Comment), Socks  Were All Patient Medications Collected?: Yes  Other Valuables: Cell phone, Money (Comment), Other (Comment)($0.02, see belonging sheet)     Valuables sent home with n/a. Valuables placed in safe in security envelope, number:  [id number]. Patient's home medications were placed in safe. Patient oriented to surroundings and program expectations and copy of patient rights given. Received admission packet:  yes. Consents reviewed, signed all paperwork. Refused nothing. Patient verbalize understanding:  yes. Patient education on precautions: yes. Presented to [facility] with depression & suicidal thoughts but denied plan. Abuses Fentanyl which resulted in an overdose in August of last year which put her in a coma & required dialysis. States she wants back on her med's.  States she has 5 children of which

## 2020-02-15 NOTE — ED NOTES
Mode of arrival (squad #, walk in, police, etc) : walk in from home        Chief complaint(s): psych eval, SI        Arrival Note (brief scenario, treatment PTA, etc). : Pt presents due to suicidal ideation and for psych evaluation. Pt states she recently ran out of her medications but is unable to recall how many days. Pt states she was unable to obtain refills due to missing recent appointment with psychologist. Pt states she couldn't get in to see dr for several weeks. Pt states she snorted fentanyl about 8 hours PTA. Pt states she did this to [employer]. \" Pt states since she has been out of medication she has been feeling emotional and suicidal. Pt denies having a plan to self harm and states \"sometimes I just don't want to be here. \" Pt denies homicidal ideation but states that she is often unpredictable when off of her medications. Pt brought in by friend. Pt arrives with red sclera bilaterally. Pt states she has been admitted to [facility] several times and \"knows the drill. \" Pt arrives with several large bags full of closed food and drinks. Pt is noted to be consistently scratching her face and neck. Pt states she is intermittently nauseous but states she believes she is hungry and needs food. Pt is A&Ox4, eupneic, PWD. GCS=15. Pt changed out and belongings placed in locker by security.       David Dean RN  02/15/20 6655

## 2020-02-15 NOTE — CARE COORDINATION
BHI Biopsychosocial Assessment    Current Level of Psychosocial Functioning     Independent X  Dependent    Minimal Assist     Comments:    Psychosocial High Risk Factors (check all that apply)    Unable to obtain meds XX (missed appointment)  Chronic illness/pain    Substance abuse  XX  Lack of Family Support   Financial stress XX  Isolation   Inadequate Community Resources  Suicide attempt(s) XX  Not taking medications  XX  Victim of crime   Developmental Delay  Unable to manage personal needs    Age 72 or older   Homeless  No transportation   Readmission within 30 days  Unemployment  Traumatic Event XX    Comments:   Psychiatric Advanced Directives: none     Family to [employer] in Treatment: declined     Sexual Orientation:   Heterosexual     Patient Strengths: SNAP benefits, insurance, lives with mother, states she has ++ support system     Patient Barriers: relapse, missed appointments and med lapse x1 month       Opiate Education Provided:  Yes (referred to CD / denied desire for placement)      CMHC/mental health history: [facility]     Plan of Care   medication management, group/individual therapies, family meetings, psycho -education, treatment team meetings to assist with stabilization    Initial Discharge Plan:  Dc to home with mother, schedule with [facility]; educate pt of AOD supports outpatient       Clinical Summary:      33 yo female observed in bed on this date, states she is sick from withdrawal and relapse of fentanyl, heroin and cocaine. She is cooperative, minimally engaged and fair eye contact. She has poor ADLs. She endorsed fleeting suicidal ideation with no plan and no intent due to relapse. She states she is linked to [facility] but missed an appointment and had lapse in medication x1 month, leading to relapse of substances and increased symptoms of mental health. She reports shame, guilt, hopelessness and helplessness. She denied hallucinations. She denied self harm and suicide attempts.  Pt reports she over dosed on fentanyl in August 2019 and was in a coma and on dialysis. She reports she had been sober x6 months until recent use. She denied referral to inpatient treatment, vague with use, but states she would talk to CD counselor to receive education of community supports. At this time pt is planning to return home to her mothers house and schedule with [facility] for mental health. She identified positive natural supports. She denied legal income but did endorse SNAP benefits and [place] medicaid. She denied current legal concerns. She endorsed history of trauma and abuse with symptoms such as nightmares.      Writer contacted CD counselor on this date

## 2020-02-16 PROCEDURE — 1240000000 HC EMOTIONAL WELLNESS R&B

## 2020-02-16 PROCEDURE — 99232 SBSQ HOSP IP/OBS MODERATE 35: CPT | Performed by: PSYCHIATRY & NEUROLOGY

## 2020-02-16 PROCEDURE — 6370000000 HC RX 637 (ALT 250 FOR IP): Performed by: PSYCHIATRY & NEUROLOGY

## 2020-02-16 RX ORDER — QUETIAPINE FUMARATE 50 MG/1
50 TABLET, FILM COATED ORAL 2 TIMES DAILY
Status: DISCONTINUED | OUTPATIENT
Start: 2020-02-17 | End: 2020-02-21 | Stop reason: HOSPADM

## 2020-02-16 RX ORDER — IBUPROFEN 400 MG/1
400 TABLET ORAL EVERY 6 HOURS PRN
Status: DISCONTINUED | OUTPATIENT
Start: 2020-02-16 | End: 2020-02-21 | Stop reason: HOSPADM

## 2020-02-16 RX ORDER — DICYCLOMINE HCL 20 MG
20 TABLET ORAL 4 TIMES DAILY PRN
Status: DISCONTINUED | OUTPATIENT
Start: 2020-02-16 | End: 2020-02-21 | Stop reason: HOSPADM

## 2020-02-16 RX ADMIN — GABAPENTIN 300 MG: 300 CAPSULE ORAL at 22:02

## 2020-02-16 RX ADMIN — QUETIAPINE FUMARATE 300 MG: 300 TABLET ORAL at 22:02

## 2020-02-16 RX ADMIN — GABAPENTIN 300 MG: 300 CAPSULE ORAL at 07:53

## 2020-02-16 RX ADMIN — QUETIAPINE FUMARATE 100 MG: 100 TABLET ORAL at 07:53

## 2020-02-16 RX ADMIN — GABAPENTIN 300 MG: 300 CAPSULE ORAL at 14:34

## 2020-02-16 RX ADMIN — HYDROXYZINE HYDROCHLORIDE 25 MG: 25 TABLET, FILM COATED ORAL at 22:03

## 2020-02-16 ASSESSMENT — PAIN DESCRIPTION - LOCATION: LOCATION: GENERALIZED

## 2020-02-16 ASSESSMENT — PAIN SCALES - GENERAL: PAINLEVEL_OUTOF10: 8

## 2020-02-16 ASSESSMENT — PAIN DESCRIPTION - PAIN TYPE: TYPE: ACUTE PAIN

## 2020-02-16 NOTE — PLAN OF CARE
Situation  Attention:Normal: No  Attention: Unable to Concentrate  Thought Processes: Blocking  Thought Content:Normal: No  Thought Content: Preoccupations, Poverty of Content  Hallucinations: None  Delusions: No  Memory:Normal: No  Memory: Poor Recent, Poor Remote  Insight and Judgment: No  Insight and Judgment: Poor Judgment, Poor Insight  Present Suicidal Ideation: No  Present Homicidal Ideation: No    Daily Assessment Last Entry:   Daily Sleep (WDL): Within Defined Limits         Patient Currently in Pain: Yes  Daily Nutrition (WDL): Within Defined Limits    Patient Monitoring:  Frequency of Checks: 4 times per hour, close    Psychiatric Symptoms:   Depression Symptoms  Depression Symptoms: Isolative, Loss of interest, Feelings of helplessness, Feelings of hopelessess, Impaired concentration  Anxiety Symptoms  Anxiety Symptoms: No problems reported or observed. Mandi Symptoms  Mandi Symptoms: No problems reported or observed. Psychosis Symptoms  Delusion Type: No problems reported or observed. Suicide Risk CSSR-S:  1) Within the past month, have you wished you were dead or wished you could go to sleep and not wake up? : Yes  2) Have you actually had any thoughts of killing yourself? : Yes  3) Have you been thinking about how you might kill yourself? : No  5) Have you started to work out or worked out the details of how to kill yourself?  Do you intend to carry out this plan? : No  6) Have you ever done anything, started to do anything, or prepared to do anything to end your life?: Yes  Change in Result: No Change in Plan of care: No      EDUCATION:   EDUCATION:   Learner Progress Toward Treatment Goals: Reviewed results and recommendations of this team, Reviewed group plan and strategies, Reviewed signs, symptoms and risk of self harm and violent behavior, Reviewed goals and plan of care    Method:small group, individual verbal education    Outcome: Patient refused to participate in treatment team

## 2020-02-16 NOTE — GROUP NOTE
Group Therapy Note    Date: 2/16/2020    Group Start Time: 1000  Group End Time: 1050  Group Topic: Psychoeducation    [facility] JUNE Morrison, SUKHJINDER        Group Therapy Note    patient refused to attend psychoeducational group at 10:00am after encouragement from staff.       Signature:  JUNE Mckeon LSW

## 2020-02-16 NOTE — H&P
Unable to perform H&P. Pt sleeping comfortably in bed, did not respond to provider. Will attempt at another time.    ELIZABETH Ortiz on 2/16/2020 at 10:58 AM

## 2020-02-16 NOTE — BH NOTE
Gets together: Not on file     Attends Mandaeism service: Not on file     Active member of club or organization: Not on file     Attends meetings of clubs or organizations: Not on file     Relationship status: Not on file    Intimate partner violence:     Fear of current or ex partner: Not on file     Emotionally abused: Not on file     Physically abused: Not on file     Forced sexual activity: Not on file   Other Topics Concern    Not on file   Social History Narrative    Not on file         Mental Status  Pt. was alert, fully oriented, and somewhat cooperative. Appearance and hygiene were unkempt. Mood was depressed. Affect was \"dysthymic and poorly reactive. Thought process was linear and well-organized. Patient denied any hallucinations or paranoia. Patient endorsed suicidal ideations. Patient denied homicidal ideations . Patient's gross cognitive functions were intact. Insight and judgement were fair. Both recent and remote memory were intact. Psychomotor status was slowed     Diagnostic Impression  Principal Problem:    Major depressive disorder, recurrent (HCC)  Active Problems:    Opioid dependence with withdrawal ([facility] [address] [address]  Resolved Problems:    * No resolved hospital problems. *        Lab Review  No results found for this or any previous visit (from the past 72 hour(s)). Medications     [START ON 2/17/2020] QUEtiapine  50 mg Oral BID- 8&2    gabapentin  300 mg Oral TID    QUEtiapine  300 mg Oral Nightly     ibuprofen, dicyclomine, nicotine polacrilex, traZODone, hydrOXYzine, acetaminophen, loperamide, promethazine    Treatment Plan:    · Admit to inpatient psychiatric treatment. · Supportive therapy with medication management. · Therapeutic activities and groups  · Follow up at [facility] after symptoms stabilized  · Discharge planning with social work.    · Reviewed and restarted home medications: Seroquel 100 mg QAM & 300 mg QHS, Neurontin 300 mg TID (checked

## 2020-02-16 NOTE — PLAN OF CARE
Problem: Substance Abuse:  Goal: Participates in care planning  Description  Participates in care planning  2/15/2020 2123 by Dima Chain  Outcome: Ongoing   Refuses offer of all unit groups et unit activities. Problem: Depressive Behavior With or Without Suicide Precautions:  Goal: Able to verbalize and/or display a decrease in depressive symptoms  Description  Able to verbalize and/or display a decrease in depressive symptoms  2/15/2020 2123 by Dima Chain  Outcome: Ongoing   Coop. With vitals taken. Affect flat. Depressed mood. Relates is not feeling any different at this time. Cont, to seclude self to room resting on bed. Refuses offer of all unit groups et unit activities, refuses offer of nourishment . Accepting of all medications provided. Problem: Depressive Behavior With or Without Suicide Precautions:  Goal: Absence of self-harm  Description  Absence of self-harm  Outcome: Ongoing   Relates is feeling safe at this time. Able to contract for safety.

## 2020-02-16 NOTE — GROUP NOTE
Group Therapy Note    Date: 2/16/2020    Group Start Time: 0900  Group End Time: 0915  Group Topic: Community Meeting    [facility]    Aries Gardnuo, [address]    Patient refused to attend Community Meeting Group at 0900 after encouragement from staff. 1:1 talk time offered.     Signature:  Thuy Juan

## 2020-02-17 PROBLEM — F33.9 MAJOR DEPRESSIVE DISORDER, RECURRENT (HCC): Chronic | Status: ACTIVE | Noted: 2020-02-15

## 2020-02-17 PROCEDURE — 99232 SBSQ HOSP IP/OBS MODERATE 35: CPT | Performed by: PSYCHIATRY & NEUROLOGY

## 2020-02-17 PROCEDURE — 1240000000 HC EMOTIONAL WELLNESS R&B

## 2020-02-17 PROCEDURE — 6370000000 HC RX 637 (ALT 250 FOR IP): Performed by: PSYCHIATRY & NEUROLOGY

## 2020-02-17 RX ORDER — BUPRENORPHINE AND NALOXONE 8; 2 MG/1; MG/1
1 FILM, SOLUBLE BUCCAL; SUBLINGUAL 2 TIMES DAILY
Status: DISCONTINUED | OUTPATIENT
Start: 2020-02-17 | End: 2020-02-21 | Stop reason: HOSPADM

## 2020-02-17 RX ADMIN — BUPRENORPHINE HYDROCHLORIDE, NALOXONE HYDROCHLORIDE 1 FILM: 8; 2 FILM, SOLUBLE BUCCAL; SUBLINGUAL at 12:23

## 2020-02-17 RX ADMIN — QUETIAPINE FUMARATE 50 MG: 50 TABLET ORAL at 14:54

## 2020-02-17 RX ADMIN — GABAPENTIN 300 MG: 300 CAPSULE ORAL at 14:53

## 2020-02-17 RX ADMIN — GABAPENTIN 300 MG: 300 CAPSULE ORAL at 21:21

## 2020-02-17 RX ADMIN — QUETIAPINE FUMARATE 50 MG: 50 TABLET ORAL at 08:14

## 2020-02-17 RX ADMIN — BUPRENORPHINE HYDROCHLORIDE, NALOXONE HYDROCHLORIDE 1 FILM: 8; 2 FILM, SOLUBLE BUCCAL; SUBLINGUAL at 21:22

## 2020-02-17 RX ADMIN — QUETIAPINE FUMARATE 300 MG: 300 TABLET ORAL at 21:22

## 2020-02-17 RX ADMIN — GABAPENTIN 300 MG: 300 CAPSULE ORAL at 08:13

## 2020-02-17 NOTE — GROUP NOTE
Group Therapy Note    Date: 2/17/2020    Group Start Time: 1430  Group End Time: 1601  Group Topic: Cognitive Skills    [facility]      Patient declined to attend recreational therapy group at 1430 despite encouragement from staff. 1:1 talk time offered by staff as alternative to group session.

## 2020-02-17 NOTE — BH NOTE
Patient refused physical assessment. No respiratory/cardiac/gastrointestinal distress observed or reported.

## 2020-02-17 NOTE — H&P
HISTORY and Treshaunna Krishnam[address]thy [address]       NAME:  Sussy Burgess  MRN: [id number]   YOB: 1990   Date: 2/17/2020   Age: 34 y.o. Gender: female     COMPLAINT AND PRESENT HISTORY:      Sussy Burgess is 34 y.o., female, admitted because of Bipolar I disorder, most recent episode depressed. Pt presented to [facility] ED with complaints of worsening depression x 2 months. She c/o suicidal ideations with no plan in place. She denied HI, AH, and VH in ED. Pt stated she had been non-compliant with her psychiatric medications and appointments, prompting relapse of Fentanyl. Pt has an extensive history of substance abuse, including OD 08/2019 requiring medically-induced coma and dialysis. ED assessment showed multiple excoriations throughout her body and injected sclera b/l from drug use. Pt has been sleeping most of the past few days. Appetite is beginning to improve. Pt resting comfortably in bed, irritated upon waking. She was cooperative with majority of H&P assessment but unwilling to discuss psychiatric history due to WD symptoms. Please refer to chart and psychiatry notes for full psychiatric history. DIAGNOSTIC RESULTS   Labs:  CBC: No results for input(s): WBC, HGB, PLT in the last 72 hours. BMP:  No results for input(s): NA, K, CL, CO2, BUN, CREATININE, GLUCOSE in the last 72 hours. Hepatic: No results for input(s): AST, ALT, ALB, BILITOT, ALKPHOS in the last 72 hours. Lipids: No results for input(s): CHOL, HDL in the last 72 hours.     Invalid input(s): LDLCALCU  U/A:  Lab Results   Component Value Date    NITRITE neg 11/22/2017    COLORU DARK YELLOW 07/17/2019    WBCUA 20 TO 50 07/17/2019    RBCUA 10 TO 20 07/17/2019    MUCUS NOT REPORTED 07/17/2019    BACTERIA MODERATE 07/17/2019    CLARITYU clear 11/22/2017    SPECGRAV 1.028 07/17/2019    LEUKOCYTESUR TRACE 07/17/2019    BLOODU neg 11/22/2017    GLUCOSEU NEGATIVE 07/17/2019    GLUCOSEU NEGATIVE 12/21/2011 AMORPHOUS NOT REPORTED 2019       PAST MEDICAL HISTORY     Past Medical History:   Diagnosis Date    Abnormal Pap smear     Anemia     Anxiety     Asthma     [facility] ([facility]     Depression     [facility] patient ([facility]     Pt taken off approx 10/1/2019    Drug abuse ([facility]     gets opiates from [address] per day    Hepatitis C     Kidney infection     Neurologic disorder     Postpartum depression     Seizures ([facility]     only when detoxing    Ulceration     to right foot     Pt denies any history of Diabetes mellitus type 2, hypertension, stroke, heart disease, COPD, GERD, HLD, Cancer, Thyroid disease, TB.    SURGICAL HISTORY       Past Surgical History:   Procedure Laterality Date    ABDOMEN SURGERY       SECTION N/A 2017     SECTION performed by Honorio Morel MD at [facility] OR    [facility]  2008    [place]  DELIVERY ONLY  10-21-14    , Low Cervical       FAMILY HISTORY     History reviewed. No pertinent family history.     SOCIAL HISTORY       Social History     Socioeconomic History    Marital status: Single     Spouse name: None    Number of children: None    Years of education: None    Highest education level: None   Occupational History    None   Social Needs    Financial resource strain: None    Food insecurity:     Worry: None     Inability: None    Transportation needs:     Medical: None     Non-medical: None   Tobacco Use    Smoking status: Never Smoker    Smokeless tobacco: Never Used   Substance and Sexual Activity    Alcohol use: No     Alcohol/week: 0.0 standard drinks     Comment: prior abuse    Drug use: Not Currently     Types: Cocaine     Comment: fentanyl    Sexual activity: Yes     Partners: Male   Lifestyle    Physical activity:     Days per week: None     Minutes per session: None    Stress: None   Relationships    Social connections:     Talks on phone: None     Gets together: None     Attends Mandaeism service: None     Active member of club or organization: None     Attends meetings of clubs or organizations: None     Relationship status: None    Intimate partner violence:     Fear of current or ex partner: None     Emotionally abused: None     Physically abused: None     Forced sexual activity: None   Other Topics Concern    None   Social History Narrative    None        REVIEW OF SYSTEMS      Allergies   Allergen Reactions    Penicillins Hives and Swelling     States throat swells    Buspirone      Other reaction(s): prev tried (mild)         No current facility-administered medications on file prior to encounter. Current Outpatient Medications on File Prior to Encounter   Medication Sig Dispense Refill    TRAZODONE HCL PO Take by mouth Pt unable to recall dosage and strength      gabapentin (NEURONTIN) 100 MG capsule TAKE 1 CAPSULE BY MOUTH THREE TIMES DAILY  0    medroxyPROGESTERone (DEPO-PROVERA) 150 MG/ML injection Inject 1 mL into the muscle every 3 months for 1 dose 1 each 2    QUEtiapine (SEROQUEL) 100 MG tablet Take 1 tablet by mouth 2 times daily 60 tablet 0         General health:  No fever or chills. Pt still experiencing WD symptoms. Pt feels anxious, diaphoretic. Skin:  No itching, redness or rash. Pt snorted Fentanyl; no injection sites per patient. Head, eyes, ears, nose, throat:  Pt c/o sore throat; agreeable to try benzocaine lozenges. No headache, epistaxis, rhinorrhea, hearing loss. Neck:  No pain, stiffness or masses. Cardiovascular/Respiratory system:  No chest pain, palpitation, shortness of breath, coughing or expectoration. Gastrointestinal tract: Pt c/o nausea and diarrhea due to withdrawal. Imodium and phenergan have already been ordered prn. Pt encouraged to use them. No abdominal pain, vomiting, or constipation. Genitourinary:  No burning on micturition. No hesitancy, urgency, frequency or discoloration of urine.      Locomotor:  No

## 2020-02-17 NOTE — GROUP NOTE
Group Therapy Note    Date: 2/17/2020    Group Start Time: 0900  Group End Time: 0915  Group Topic: Community Meeting    [address] [place] [address] JHONATAN Perez      Patient declined to attend community meeting/goal setting group at 0900 despite encouragement from staff. 1:1 talk time offered by staff as alternative to group session.       Signature:  Ayana Perez

## 2020-02-17 NOTE — BH NOTE
Department of Psychiatry  Attending Progress Note  Chief Complaint: Bipolar I disorder, most recent episode depressed ([address]     SUBJECTIVE:    Patient is feeling depressed and sad. She complains of lack of energy motivation sleep appetite and in doing things. She has suicidal thoughts. She feels that her life is a waste and she should die. She said she is also having cravings from opiates. She relapsed on drugs. She used cocaine and fentanyl pain patches. She feels that she has no motivation and interest.  She wants to die. OBJECTIVE    Physical  BP (!) 124/50   Pulse 93   Temp 97.9 °F (36.6 °C) (Oral)   Resp 14   Ht 5' 4\" (1.626 m)   Wt 194 lb (88 kg)   SpO2 95%   BMI 33.30 kg/m²      Mental Status Evaluation:  Patient is cooperative. She has adequate eye contact. She has adequate rapport. She complains of lack of energy and motivation. Her psychomotor activity is decreased. She has adequate eye contact. Her speech is within normal limits. Her mood subjectively sad. Objectively appears to be depressed and has appropriate affect. Thought processes within normal limits. Thought content predominantly of depression sadness lack of energy and motivation. She has suicidal thoughts. She denies any hallucinations or delusions. She is of average intelligence. She is oriented to time place and person. Her memory to recent remote and immediate events are within normal limits. She has poor attention and concentration. Her insight and judgment are impaired. Her abstraction is fair. No results found for this or any previous visit (from the past 72 hour(s)).        Medications  Current Facility-Administered Medications   Medication Dose Route Frequency Provider Last Rate Last Dose    buprenorphine-naloxone (SUBOXONE) 8-2 MG SL film 1 Film  1 Film Sublingual BID Jani CORADO MD        ibuprofen (ADVIL;MOTRIN) tablet 400 mg  400 mg Oral Q6H PRN Oscar Beavers MD        dicyclomine (BENTYL) tablet 20 mg  20 mg Oral 4x Daily PRN Torres Onofre MD        QUEtiapine (SEROQUEL) tablet 50 mg  50 mg Oral BID- 8&2 Torres Onofre MD   50 mg at 02/17/20 0473    nicotine polacrilex (NICORETTE) gum 2 mg  2 mg Oral PRN Kristy Chavez MD        traZODone (DESYREL) tablet 50 mg  50 mg Oral Nightly PRN Kristy Chavez MD        gabapentin (NEURONTIN) capsule 300 mg  300 mg Oral TID Torres Onofre MD   300 mg at 02/17/20 0813    hydrOXYzine (ATARAX) tablet 25 mg  25 mg Oral TID PRN Torres Onofre MD   25 mg at 02/16/20 2203    acetaminophen (TYLENOL) tablet 650 mg  650 mg Oral Q4H PRN Torres Onofre MD        QUEtiapine (SEROQUEL) tablet 300 mg  300 mg Oral Nightly Torres Onofre MD   300 mg at 02/16/20 2202    loperamide (IMODIUM) capsule 2 mg  2 mg Oral 4x Daily PRN Torres Onofre MD        promethazine (PHENERGAN) tablet 12.5 mg  12.5 mg Oral Q6H PRN Torres Onofre MD   12.5 mg at 02/15/20 1810         buprenorphine-naloxone  1 Film Sublingual BID    QUEtiapine  50 mg Oral BID- 8&2    gabapentin  300 mg Oral TID    QUEtiapine  300 mg Oral Nightly       ASSESSMENT  Bipolar I disorder, most recent episode depressed ([facility]     Patient's Response to Treatment: negative    PLAN  Dragon voice recognition software used in portions of this document. To start her on Suboxone as she is experiencing cravings and relapsed on drugs.

## 2020-02-17 NOTE — PLAN OF CARE
Problem: Substance Abuse:  Goal: Absence of drug withdrawal signs and symptoms  Description  Absence of drug withdrawal signs and symptoms  Outcome: Ongoing   Patient states shaking related to both anxiety and withdrawal.  Problem: Depressive Behavior With or Without Suicide Precautions:  Goal: Able to verbalize and/or display a decrease in depressive symptoms  Description  Able to verbalize and/or display a decrease in depressive symptoms  Outcome: Ongoing   Patient states anxiety and shaking; depression lessened slightly.

## 2020-02-18 PROCEDURE — 6370000000 HC RX 637 (ALT 250 FOR IP): Performed by: PSYCHIATRY & NEUROLOGY

## 2020-02-18 PROCEDURE — 1240000000 HC EMOTIONAL WELLNESS R&B

## 2020-02-18 PROCEDURE — 99232 SBSQ HOSP IP/OBS MODERATE 35: CPT | Performed by: PSYCHIATRY & NEUROLOGY

## 2020-02-18 RX ORDER — GUAIFENESIN 600 MG/1
600 TABLET, EXTENDED RELEASE ORAL 2 TIMES DAILY
Status: DISCONTINUED | OUTPATIENT
Start: 2020-02-18 | End: 2020-02-21 | Stop reason: HOSPADM

## 2020-02-18 RX ORDER — LEVOFLOXACIN 500 MG/1
500 TABLET, FILM COATED ORAL DAILY
Status: DISCONTINUED | OUTPATIENT
Start: 2020-02-18 | End: 2020-02-21 | Stop reason: HOSPADM

## 2020-02-18 RX ADMIN — BUPRENORPHINE HYDROCHLORIDE, NALOXONE HYDROCHLORIDE 1 FILM: 8; 2 FILM, SOLUBLE BUCCAL; SUBLINGUAL at 21:38

## 2020-02-18 RX ADMIN — GABAPENTIN 300 MG: 300 CAPSULE ORAL at 12:45

## 2020-02-18 RX ADMIN — GABAPENTIN 300 MG: 300 CAPSULE ORAL at 21:37

## 2020-02-18 RX ADMIN — GUAIFENESIN 600 MG: 600 TABLET, EXTENDED RELEASE ORAL at 21:37

## 2020-02-18 RX ADMIN — QUETIAPINE FUMARATE 50 MG: 50 TABLET ORAL at 12:45

## 2020-02-18 RX ADMIN — QUETIAPINE FUMARATE 50 MG: 50 TABLET ORAL at 08:41

## 2020-02-18 RX ADMIN — GABAPENTIN 300 MG: 300 CAPSULE ORAL at 08:40

## 2020-02-18 RX ADMIN — QUETIAPINE FUMARATE 300 MG: 300 TABLET ORAL at 21:38

## 2020-02-18 RX ADMIN — GUAIFENESIN 600 MG: 600 TABLET, EXTENDED RELEASE ORAL at 12:45

## 2020-02-18 RX ADMIN — BUPRENORPHINE HYDROCHLORIDE, NALOXONE HYDROCHLORIDE 1 FILM: 8; 2 FILM, SOLUBLE BUCCAL; SUBLINGUAL at 08:41

## 2020-02-18 RX ADMIN — LEVOFLOXACIN 500 MG: 500 TABLET, FILM COATED ORAL at 12:45

## 2020-02-18 NOTE — PLAN OF CARE
Problem: Substance Abuse:  Goal: Absence of drug withdrawal signs and symptoms  Description  Absence of drug withdrawal signs and symptoms  2/17/2020 2320 by Abimbola Craft LPN  Outcome: Ongoing     Problem: Depressive Behavior With or Without Suicide Precautions:  Goal: Able to verbalize and/or display a decrease in depressive symptoms  Description  Able to verbalize and/or display a decrease in depressive symptoms  2/17/2020 2320 by Abimbola Craft LPN  Outcome: Ongoing     Problem: Depressive Behavior With or Without Suicide Precautions:  Goal: Absence of self-harm  Description  Absence of self-harm  Outcome: Ongoing   Patient currently denies suicidal and homicidal thoughts. No sign of self harm noted. Patient admits to depression and anxiety. Patient encouraged to explore coping skills for reducing anxiety. Patient states withdrawal symptoms are decreasing. Patient isolative in room most of the evening. Patient is agreeable to seeking out help if thoughts of self harm arise. Will continue to monitor for safety every 15 minutes and provide support as needed.

## 2020-02-18 NOTE — GROUP NOTE
Group Therapy Note    Date: 2/18/2020    Group Start Time: 1330  Group End Time: 6080  Group Topic: Psychoeducation    MITCH Krishna, CTRS    Patient refused to attend Recreational Therapy Group at 1330 after encouragement from staff. 1:1 talk time offered.     Signature:  Ruchi Jackson

## 2020-02-18 NOTE — CARE COORDINATION
AOD Treatment & D/C planning: This writer and patient discussed AOD treatment options. Patient reports she wishes to continue Suboxone after hospital discharge. Patient reports she is most interested in [facility] and has been there in the past. Patient reports she left there before finishing the program but believe they will take her back. This writer provided patient with application and encouraged her to complete. [address] [place] [address]  [phone] ext. [phone]  Fax: [phone]  SY[facility]A on file    This writer will continue to coordinate with patient, staff, and referral agencies.

## 2020-02-18 NOTE — GROUP NOTE
Group Therapy Note    Date: 2/18/2020    Group Start Time: 1100  Group End Time: 1200  Group Topic: Recreational    [facility] A Cecily Brittle, CTRS    Patient did not attend Recreation Therapy Group after encouragement from staff. 1:1 talk time offered but patient refused.      Signature:  Rodger Lyon

## 2020-02-18 NOTE — GROUP NOTE
Group Therapy Note    Date: 2/18/2020    Group Start Time: 1000  Group End Time: 9077  Group Topic: Psychotherapy    Via Karen Washington, MSW, LSW        Group Therapy Note    Attendees: 5/9    Pt declined to attend psychotherapy at 1000 am despite encouragement. Pt offered 1:1 and refused.

## 2020-02-18 NOTE — GROUP NOTE
Group Therapy Note    Date: 2/18/2020    Group Start Time: 1630  Group End Time: 1700  Group Topic: Healthy Living/Wellness    STCZ DENIS Hollins RN        Group Therapy Note    Attendees: 10/18       Patient refused to attend wellness group at 1630 after encouragement from staff. 1:1 talk time offered as alternative to group session but patient declined.      Signature:  Torres Hollins RN

## 2020-02-18 NOTE — GROUP NOTE
Group Therapy Note    Date: 2/18/2020    Group Start Time: 0845  Group End Time: 4334  Group Topic: [address], CTRS    Patient did not attend Community Meeting/Goal Setting Group after encouragement from staff. 1:1 talk time offered but patient refused.      Signature:  Elkin Lyn

## 2020-02-18 NOTE — BH NOTE
Scottie Galvan MD   1 Film at 02/18/20 0841    benzocaine-menthol (CEPACOL SORE THROAT) lozenge 1 lozenge  1 lozenge Oral Q2H PRN ELIZABETH Salinas   1 lozenge at 02/18/20 0842    ibuprofen (ADVIL;MOTRIN) tablet 400 mg  400 mg Oral Q6H PRN oJse Garsia MD        dicyclomine (BENTYL) tablet 20 mg  20 mg Oral 4x Daily PRN Starr Cleveland MD        QUEtiapine (SEROQUEL) tablet 50 mg  50 mg Oral BID- 8&2 Starr Cleveland MD   50 mg at 02/18/20 0841    nicotine polacrilex (NICORETTE) gum 2 mg  2 mg Oral PRN Jose Garsia MD        traZODone (DESYREL) tablet 50 mg  50 mg Oral Nightly PRN Jose Garsia MD        gabapentin (NEURONTIN) capsule 300 mg  300 mg Oral TID Starr Cleveland MD   300 mg at 02/18/20 0840    hydrOXYzine (ATARAX) tablet 25 mg  25 mg Oral TID PRN Starr Cleveland MD   25 mg at 02/16/20 2203    acetaminophen (TYLENOL) tablet 650 mg  650 mg Oral Q4H PRN Starr Cleveland MD        QUEtiapine (SEROQUEL) tablet 300 mg  300 mg Oral Nightly Starr Cleveland MD   300 mg at 02/17/20 2122    loperamide (IMODIUM) capsule 2 mg  2 mg Oral 4x Daily PRN Starr Cleveland MD        promethazine (PHENERGAN) tablet 12.5 mg  12.5 mg Oral Q6H PRN Starr Cleveland MD   12.5 mg at 02/15/20 1810         levofloxacin  500 mg Oral Daily    guaiFENesin  600 mg Oral BID    buprenorphine-naloxone  1 Film Sublingual BID    QUEtiapine  50 mg Oral BID- 8&2    gabapentin  300 mg Oral TID    QUEtiapine  300 mg Oral Nightly       ASSESSMENT  Bipolar I disorder, most recent episode depressed ([facility]     Patient's Response to Treatment: negative    PLAN  Dragon voice recognition software used in portions of this document. To continue current management. Supportive therapy was provided.   Started her on Levaquin for 7 days

## 2020-02-19 PROCEDURE — 6370000000 HC RX 637 (ALT 250 FOR IP): Performed by: PSYCHIATRY & NEUROLOGY

## 2020-02-19 PROCEDURE — 99232 SBSQ HOSP IP/OBS MODERATE 35: CPT | Performed by: PSYCHIATRY & NEUROLOGY

## 2020-02-19 PROCEDURE — 1240000000 HC EMOTIONAL WELLNESS R&B

## 2020-02-19 RX ADMIN — QUETIAPINE FUMARATE 50 MG: 50 TABLET ORAL at 13:22

## 2020-02-19 RX ADMIN — HYDROXYZINE HYDROCHLORIDE 25 MG: 25 TABLET, FILM COATED ORAL at 22:13

## 2020-02-19 RX ADMIN — BUPRENORPHINE HYDROCHLORIDE, NALOXONE HYDROCHLORIDE 1 FILM: 8; 2 FILM, SOLUBLE BUCCAL; SUBLINGUAL at 08:54

## 2020-02-19 RX ADMIN — QUETIAPINE FUMARATE 300 MG: 300 TABLET ORAL at 22:14

## 2020-02-19 RX ADMIN — GABAPENTIN 300 MG: 300 CAPSULE ORAL at 13:22

## 2020-02-19 RX ADMIN — QUETIAPINE FUMARATE 50 MG: 50 TABLET ORAL at 08:53

## 2020-02-19 RX ADMIN — GABAPENTIN 300 MG: 300 CAPSULE ORAL at 08:53

## 2020-02-19 RX ADMIN — GABAPENTIN 300 MG: 300 CAPSULE ORAL at 22:14

## 2020-02-19 RX ADMIN — GUAIFENESIN 600 MG: 600 TABLET, EXTENDED RELEASE ORAL at 08:53

## 2020-02-19 RX ADMIN — GUAIFENESIN 600 MG: 600 TABLET, EXTENDED RELEASE ORAL at 22:14

## 2020-02-19 RX ADMIN — BUPRENORPHINE HYDROCHLORIDE, NALOXONE HYDROCHLORIDE 1 FILM: 8; 2 FILM, SOLUBLE BUCCAL; SUBLINGUAL at 22:15

## 2020-02-19 RX ADMIN — HYDROXYZINE HYDROCHLORIDE 25 MG: 25 TABLET, FILM COATED ORAL at 13:22

## 2020-02-19 RX ADMIN — LEVOFLOXACIN 500 MG: 500 TABLET, FILM COATED ORAL at 08:53

## 2020-02-19 NOTE — GROUP NOTE
Group Therapy Note    Date: 2/19/2020    Group Start Time: 1000  Group End Time: 1363  Group Topic: Psychotherapy    Via Karen Washington, MSW, LSW        Group Therapy Note    Attendees: 7/9    Pt declined to attend psychotherapy at 1000 am despite encouragement. Pt offered 1:1 and refused.

## 2020-02-19 NOTE — PLAN OF CARE
Problem: Substance Abuse:  Goal: Absence of drug withdrawal signs and symptoms  Description  Absence of drug withdrawal signs and symptoms  Outcome: Ongoing   Patient denies withdrawal symptoms  states she is feeling better than on admission  Problem: Substance Abuse:  Goal: Participates in care planning  Description  Participates in care planning  Outcome: Ongoing   Patient remains aloof and isolative to room  is encouraged to participate in care planning  Problem: Depressive Behavior With or Without Suicide Precautions:  Goal: Able to verbalize and/or display a decrease in depressive symptoms  Description  Able to verbalize and/or display a decrease in depressive symptoms  2/19/2020 1153 by Nadine Reyna LPN  Outcome: Ongoing   Adits to depression  is encouraged to attend group programmig to learn new coping skills  patient maintains good appetite  is encouraged to attend to adl's as patient appears disheveled in appearance  Problem: Depressive Behavior With or Without Suicide Precautions:  Goal: Absence of self-harm  Description  Absence of self-harm  2/19/2020 1153 by Nadine Reyna LPN  Outcome: Ongoing   Remains free of harm  safety checks continue per unit protocol

## 2020-02-19 NOTE — PLAN OF CARE
Problem: Depressive Behavior With or Without Suicide Precautions:  Goal: Able to verbalize and/or display a decrease in depressive symptoms  Description  Able to verbalize and/or display a decrease in depressive symptoms  Outcome: Ongoing   Patient is out and social with select peers. Patient appears anxious and is focused on medications. Patient is accepting of 1:1 talk time and currently denies any suicidal or homicidal thoughts, as well as any auditory or visual hallucinations. Patient reports good appetite and no sleep disturbances. Patient discusses no plans for discharge at this time. Will continue to monitor. Problem: Depressive Behavior With or Without Suicide Precautions:  Goal: Absence of self-harm  Description  Absence of self-harm  Outcome: Ongoing   Patient remains free from self harm. Patient agrees to seek staff if thoughts arise. 15 minute checks maintained for patient safety. Will continue to monitor and provide support and reassurance as needed.

## 2020-02-19 NOTE — BH NOTE
Department of Psychiatry  Attending Progress Note  Chief Complaint: Bipolar I disorder, most recent episode depressed ([address]     SUBJECTIVE:    Patient complains of agitation and anxiety. She feels restless. She feels hopeless useless worthless and complains of suicidal thoughts and feels that her life is a waste and she should die. OBJECTIVE    Physical  /78   Pulse 102   Temp 98 °F (36.7 °C) (Oral)   Resp 14   Ht 5' 4\" (1.626 m)   Wt 194 lb (88 kg)   SpO2 95%   BMI 33.30 kg/m²      Mental Status Evaluation:    Patient is cooperative. She has adequate eye contact. She has adequate rapport. She complains of lack of energy and motivation. Her psychomotor activity is decreased. She has adequate eye contact. Her speech is within normal limits. Her mood subjectively sad. Objectively appears to be depressed and has appropriate affect. Thought processes within normal limits. Thought content predominantly of depression sadness lack of energy and motivation. She has suicidal thoughts. She denies any hallucinations or delusions. She is of average intelligence. She is oriented to time place and person. Her memory to recent remote and immediate events are within normal limits. She has poor attention and concentration. Her insight and judgment are impaired. Her abstraction is fair. No results found for this or any previous visit (from the past 72 hour(s)).        Medications  Current Facility-Administered Medications   Medication Dose Route Frequency Provider Last Rate Last Dose    levofloxacin (LEVAQUIN) tablet 500 mg  500 mg Oral Daily Jani CORADO MD   500 mg at 02/19/20 0853    guaiFENesin (MUCINEX) extended release tablet 600 mg  600 mg Oral BID Jani CORADO MD   600 mg at 02/19/20 0853    buprenorphine-naloxone (SUBOXONE) 8-2 MG SL film 1 Film  1 Film Sublingual BID Cricket Tan MD   1 Film at 02/19/20 0854    benzocaine-menthol (CEPACOL SORE THROAT) lozenge 1 lozenge  1 lozenge Oral Q2H PRN ELIZABETH Salinas   1 lozenge at 02/18/20 1247    ibuprofen (ADVIL;MOTRIN) tablet 400 mg  400 mg Oral Q6H PRN Alfie Blunt MD        dicyclomine (BENTYL) tablet 20 mg  20 mg Oral 4x Daily PRN Yuridia Ivy MD        QUEtiapine (SEROQUEL) tablet 50 mg  50 mg Oral BID- 8&2 Yuridia Ivy MD   50 mg at 02/19/20 1530    nicotine polacrilex (NICORETTE) gum 2 mg  2 mg Oral PRN Alfie Blunt MD        traZODone (DESYREL) tablet 50 mg  50 mg Oral Nightly PRN Alfie Blunt MD        gabapentin (NEURONTIN) capsule 300 mg  300 mg Oral TID Yuridia Ivy MD   300 mg at 02/19/20 4989    hydrOXYzine (ATARAX) tablet 25 mg  25 mg Oral TID PRN Yuridia Ivy MD   25 mg at 02/16/20 2203    acetaminophen (TYLENOL) tablet 650 mg  650 mg Oral Q4H PRN Yuridia Ivy MD        QUEtiapine (SEROQUEL) tablet 300 mg  300 mg Oral Nightly Yuridia Ivy MD   300 mg at 02/18/20 2138    loperamide (IMODIUM) capsule 2 mg  2 mg Oral 4x Daily PRN Yuridia Ivy MD        promethazine (PHENERGAN) tablet 12.5 mg  12.5 mg Oral Q6H PRN Yuridia Ivy MD   12.5 mg at 02/15/20 1810         levoFLOXacin  500 mg Oral Daily    guaiFENesin  600 mg Oral BID    buprenorphine-naloxone  1 Film Sublingual BID    QUEtiapine  50 mg Oral BID- 8&2    gabapentin  300 mg Oral TID    QUEtiapine  300 mg Oral Nightly       ASSESSMENT  Bipolar I disorder, most recent episode depressed ([facility]     Patient's Response to Treatment: positive    PLAN  Dragon voice recognition software used in portions of this document. To continue current management.

## 2020-02-19 NOTE — GROUP NOTE
Group Therapy Note    Date: 2/19/2020    Group Start Time: 0900  Group End Time: 0920  Group Topic: Edmund MUIRI A    Rafa Moore     Patient declined to attend community meeting/goal setting group at 0900, 1:1 talk time offered as an alternative.      Signature:  Rafa Moore

## 2020-02-19 NOTE — GROUP NOTE
Group Therapy Note    Date: 2/19/2020    Group Start Time: 1330  Group End Time: 1452  Group Topic: Psychoeducation    MITCH Schmitt      Patient declined to attend recreational therapy group at 1330 despite encouragement from staff. 1:1 talk time offered by staff as alternative to group session.       Signature:  Cristela Francis

## 2020-02-20 PROCEDURE — 6370000000 HC RX 637 (ALT 250 FOR IP): Performed by: PSYCHIATRY & NEUROLOGY

## 2020-02-20 PROCEDURE — 1240000000 HC EMOTIONAL WELLNESS R&B

## 2020-02-20 PROCEDURE — 99232 SBSQ HOSP IP/OBS MODERATE 35: CPT | Performed by: NURSE PRACTITIONER

## 2020-02-20 RX ADMIN — QUETIAPINE FUMARATE 50 MG: 50 TABLET ORAL at 08:06

## 2020-02-20 RX ADMIN — GUAIFENESIN 600 MG: 600 TABLET, EXTENDED RELEASE ORAL at 08:06

## 2020-02-20 RX ADMIN — GABAPENTIN 300 MG: 300 CAPSULE ORAL at 08:06

## 2020-02-20 RX ADMIN — GABAPENTIN 300 MG: 300 CAPSULE ORAL at 23:03

## 2020-02-20 RX ADMIN — LEVOFLOXACIN 500 MG: 500 TABLET, FILM COATED ORAL at 08:06

## 2020-02-20 RX ADMIN — GUAIFENESIN 600 MG: 600 TABLET, EXTENDED RELEASE ORAL at 23:03

## 2020-02-20 RX ADMIN — QUETIAPINE FUMARATE 300 MG: 300 TABLET ORAL at 23:03

## 2020-02-20 RX ADMIN — GABAPENTIN 300 MG: 300 CAPSULE ORAL at 15:13

## 2020-02-20 RX ADMIN — BUPRENORPHINE HYDROCHLORIDE, NALOXONE HYDROCHLORIDE 1 FILM: 8; 2 FILM, SOLUBLE BUCCAL; SUBLINGUAL at 23:02

## 2020-02-20 RX ADMIN — QUETIAPINE FUMARATE 50 MG: 50 TABLET ORAL at 15:13

## 2020-02-20 RX ADMIN — HYDROXYZINE HYDROCHLORIDE 25 MG: 25 TABLET, FILM COATED ORAL at 23:03

## 2020-02-20 RX ADMIN — BUPRENORPHINE HYDROCHLORIDE, NALOXONE HYDROCHLORIDE 1 FILM: 8; 2 FILM, SOLUBLE BUCCAL; SUBLINGUAL at 08:43

## 2020-02-20 RX ADMIN — HYDROXYZINE HYDROCHLORIDE 25 MG: 25 TABLET, FILM COATED ORAL at 08:43

## 2020-02-20 NOTE — GROUP NOTE
Group Therapy Note    Date: 2/20/2020    Group Start Time: 1100  Group End Time: 0720  Group Topic: Recreational    MITCH Stafford, CTRS    Patient did not attend Recreation Therapy Group after encouragement from staff. 1:1 talk time offered but patient refused.      Signature:  Layo Armenta

## 2020-02-20 NOTE — PROGRESS NOTES
Psychiatric Progress Note - Psychiatric Nurse Practitioner      Pertinent History & Psychiatric Examination    HPI:  Phillip Barnes was interviewed in the day area today. She is casually dressed with adequate hygiene. She reports that she is doing a lot better than she was. She feels that Suboxone is helping and she wants to go to outpatient program at discharge but wants to research inpatient treatment options. She seems to process information slower and she reports that she is hard of hearing and it takes her a while to think through what she heard. She denies any depression or anxiety. She reports that racing thoughts have stopped. She denies suicidal or homicidal thoughts. She denies auditory or visual hallucinations. She is going to groups. She is sleeping well and eating but she is eating large quantities it is reported. She is taking Atarax at least twice daily and it is effective for anxiety. Chart and medications reviewed. Therapeutic support, empathetic care and psycho education provided. At this time there is no safe alternative other than inpatient care.       Complaints of Pain: none  Functioning Relationships: good support system      Mental Status Evaluation:  Orientation: alertness: alert   Mood:. euthymic      Affect:  normal      Appearance:  casually dressed and overweight   Activity:  Within Normal Limits   Gait/Posture: Normal   Speech:  normal pitch and normal volume   Thought Process:  Hard of hearing, processes slower   Thought Content:  No abnormal thoughts   Sensorium:  person, place, time/date and situation   Cognition:  grossly intact   Memory: intact   Insight:  fair   Judgment: fair   Suicidal Ideations: denies suicidal ideation   Homicidal Ideations: Negative for homicidal ideation      Medication Side Effects: absent       Attention Span attention span and concentration were age appropriate     Clinical Assessment Medical Decision    Axis I: Bipolar 1 Disorder        Precautions with Justification:   None    Medication Review/Mgmt: Medications reviewed - no changes     Medical Issues: See Chart    Assessment of Risk for Harm to Self/Others:  None    PLAN  · Continue inpatient psychiatric treatment  · Supportive therapy with medication management. Reviewed risks and benefits as well as potential side effects with patient. · Review medications for efficacy and side effects. · Therapeutic support and empathetic care provided. · Engage in therapeutic activities and groups. · Follow up at [facility] after symptoms stabilized.       Anticipated Discharge Date: 2/21/2020    Patient's Response to Treatment: positive    Julian Joseph  2/20/2020  3:19 PM

## 2020-02-20 NOTE — GROUP NOTE
Group Therapy Note    Date: 2/20/2020    Group Start Time: 1330  Group End Time: 5732  Group Topic: Psychoeducation    MITCH ISRAEL    [place], [place]    Attendees: 6         Patient's Goal:  To gain knowledge of the difference between internal and external stressors. To be able to recognize physical symptoms experienced during times of stress and way to relieve stress. Notes:  Patient attended group and actively participated in task at hand. Patient conversed appropriately with peers and David Harris. Patient struggled with focus at times. Status After Intervention:  Improved    Participation Level:  Active Listener and Interactive    Participation Quality: Appropriate, Attentive and Sharing      Speech:  normal      Thought Process/Content: Logical      Affective Functioning: Congruent      Mood: euthymic      Level of consciousness:  Alert, Oriented x4 and Attentive      Response to Learning: Able to verbalize current knowledge/experience, Able to verbalize/acknowledge new learning, Able to retain information, Capable of insight and Progressing to goal      Endings: None Reported       Modes of Intervention: Education, Support, Socialization, Exploration, Clarifying, Problem-solving, Activity, Confrontation, Limit-setting and Reality-testing      Discipline Responsible: Psychoeducational Specialist      Signature:  Christiano Bernardo

## 2020-02-20 NOTE — FLOWSHEET NOTE
*Patient participated in the [address] [place] [address]       02/20/20 1532   Encounter Summary   Services provided to: Patient   Referral/Consult From: Rounding   Continue Visiting   (2/20/20)   Complexity of Encounter Moderate   Length of Encounter 30 minutes   Spiritual Assessment Completed Yes   Spiritual/Congregation   Type Spiritual support   Assessment Calm; Approachable   Intervention Active listening;Prayer   Outcome Receptive; Expressed gratitude

## 2020-02-20 NOTE — GROUP NOTE
Group Therapy Note    Date: 2/20/2020    Group Start Time: 0091  Group End Time: 4977  Group Topic: Community Meeting    [address] [facility], [facility]     Patient refused to attend [employer] at 1764 after encouragement from staff. 1:1 talk time offered.     Signature:  Renaldo Carlson

## 2020-02-20 NOTE — PLAN OF CARE
Problem: Substance Abuse:  Goal: Absence of drug withdrawal signs and symptoms  Description  Absence of drug withdrawal signs and symptoms  2/19/2020 2102 by Mandy Thompson RN  Outcome: Ongoing   Denies withdrawal symptoms @ this x. Problem: Depressive Behavior With or Without Suicide Precautions:  Goal: Able to verbalize and/or display a decrease in depressive symptoms  Description  Able to verbalize and/or display a decrease in depressive symptoms  2/19/2020 2102 by Mandy Thompson RN  Outcome: Ongoing   States she is less depressed denying s/I. Problem: Pain:  Goal: Control of acute pain  Description  Control of acute pain  Outcome: Ongoing   Denies pain.

## 2020-02-21 VITALS
HEART RATE: 85 BPM | OXYGEN SATURATION: 95 % | RESPIRATION RATE: 14 BRPM | DIASTOLIC BLOOD PRESSURE: 63 MMHG | TEMPERATURE: 98.5 F | WEIGHT: 194 LBS | HEIGHT: 64 IN | SYSTOLIC BLOOD PRESSURE: 101 MMHG | BODY MASS INDEX: 33.12 KG/M2

## 2020-02-21 PROCEDURE — 6370000000 HC RX 637 (ALT 250 FOR IP): Performed by: PSYCHIATRY & NEUROLOGY

## 2020-02-21 PROCEDURE — 99238 HOSP IP/OBS DSCHRG MGMT 30/<: CPT | Performed by: NURSE PRACTITIONER

## 2020-02-21 PROCEDURE — 5130000000 HC BRIDGE APPOINTMENT

## 2020-02-21 RX ORDER — GABAPENTIN 300 MG/1
300 CAPSULE ORAL 3 TIMES DAILY
Qty: 42 CAPSULE | Refills: 0 | Status: ON HOLD | OUTPATIENT
Start: 2020-02-21 | End: 2020-07-22

## 2020-02-21 RX ORDER — BUPRENORPHINE AND NALOXONE 8; 2 MG/1; MG/1
1 FILM, SOLUBLE BUCCAL; SUBLINGUAL 2 TIMES DAILY
Qty: 8 FILM | Refills: 0
Start: 2020-02-21 | End: 2020-02-25

## 2020-02-21 RX ORDER — QUETIAPINE FUMARATE 300 MG/1
300 TABLET, FILM COATED ORAL NIGHTLY
Qty: 14 TABLET | Refills: 0 | Status: ON HOLD | OUTPATIENT
Start: 2020-02-21 | End: 2020-07-22 | Stop reason: SDUPTHER

## 2020-02-21 RX ORDER — QUETIAPINE FUMARATE 50 MG/1
50 TABLET, FILM COATED ORAL 2 TIMES DAILY
Qty: 28 TABLET | Refills: 0 | Status: ON HOLD | OUTPATIENT
Start: 2020-02-21 | End: 2020-07-17 | Stop reason: DRUGHIGH

## 2020-02-21 RX ORDER — LEVOFLOXACIN 500 MG/1
500 TABLET, FILM COATED ORAL DAILY
Qty: 6 TABLET | Refills: 0 | Status: SHIPPED | OUTPATIENT
Start: 2020-02-22 | End: 2020-02-28

## 2020-02-21 RX ORDER — HYDROXYZINE HYDROCHLORIDE 25 MG/1
25 TABLET, FILM COATED ORAL 3 TIMES DAILY PRN
Qty: 42 TABLET | Refills: 0 | Status: SHIPPED | OUTPATIENT
Start: 2020-02-21 | End: 2020-03-02

## 2020-02-21 RX ADMIN — HYDROXYZINE HYDROCHLORIDE 25 MG: 25 TABLET, FILM COATED ORAL at 15:20

## 2020-02-21 RX ADMIN — LEVOFLOXACIN 500 MG: 500 TABLET, FILM COATED ORAL at 08:44

## 2020-02-21 RX ADMIN — QUETIAPINE FUMARATE 50 MG: 50 TABLET ORAL at 08:41

## 2020-02-21 RX ADMIN — GUAIFENESIN 600 MG: 600 TABLET, EXTENDED RELEASE ORAL at 08:41

## 2020-02-21 RX ADMIN — GABAPENTIN 300 MG: 300 CAPSULE ORAL at 15:06

## 2020-02-21 RX ADMIN — GABAPENTIN 300 MG: 300 CAPSULE ORAL at 08:41

## 2020-02-21 RX ADMIN — HYDROXYZINE HYDROCHLORIDE 25 MG: 25 TABLET, FILM COATED ORAL at 08:47

## 2020-02-21 RX ADMIN — QUETIAPINE FUMARATE 50 MG: 50 TABLET ORAL at 15:11

## 2020-02-21 RX ADMIN — BUPRENORPHINE HYDROCHLORIDE, NALOXONE HYDROCHLORIDE 1 FILM: 8; 2 FILM, SOLUBLE BUCCAL; SUBLINGUAL at 08:41

## 2020-02-21 ASSESSMENT — PAIN SCALES - GENERAL: PAINLEVEL_OUTOF10: 0

## 2020-02-21 NOTE — GROUP NOTE
Group Therapy Note    Date: 2/21/2020    Group Start Time: 1330  Group End Time: 8005  Group Topic: Psychoeducation    [facility]    [place]        Group Therapy Note    Attendees: 9/16         Patient's Goal:  To decrease stress and improve leisure awareness     Notes:  Patient was pleasant and appropriate throughout the session     Status After Intervention:  Improved    Participation Level:  Active Listener and Interactive    Participation Quality: Appropriate, Attentive, Sharing and Supportive      Speech:  normal      Thought Process/Content: Logical      Affective Functioning: Congruent      Mood: euthymic      Level of consciousness:  Alert, Oriented x4 and Attentive      Response to Learning: Able to verbalize current knowledge/experience, Able to verbalize/acknowledge new learning, Capable of insight, Able to change behavior and Progressing to goal      Endings: None Reported    Modes of Intervention: Education, Support, Socialization, Exploration, Clarifying, Problem-solving and Activity      Discipline Responsible: Psychoeducational Specialist      Signature:  Ju Chavira

## 2020-02-21 NOTE — CARE COORDINATION
Where to Get Your Medications      These medications were sent to [facility], [address] [place] [address]    Phone:  [phone]   · gabapentin 300 MG capsule  · hydrOXYzine 25 MG tablet  · levoFLOXacin 500 MG tablet  · QUEtiapine 300 MG tablet  · QUEtiapine 50 MG tablet     Information about where to get these medications is not yet available    Ask your nurse or doctor about these medications  · buprenorphine-naloxone 8-2 MG Film SL film         Follow Up Appointment: [address], [address] [place] [address]  P: [phone]  F: [phone]  Go on 2/24/2020  @ 2:30 pm; intake. Will see doctor for dosing on Tuesday 2/25/2020    Zepf  (f) [phone]  (P) [phone]  [facility].   [facility], [address] [place] [address] [address]    On 2/26/2020  @2PM for medication management

## 2020-02-21 NOTE — GROUP NOTE
Group Therapy Note    Date: 2/21/2020    Group Start Time: 1000  Group End Time: 4526  Group Topic: Psychotherapy    Via Karen Washington, MSW, LSW        Group Therapy Note    Attendees: 4/9    Pt declined to attend psychotherapy at 1000 am despite encouragement. Pt offered 1:1 and refused.

## 2020-02-21 NOTE — DISCHARGE SUMMARY
DISCHARGE SUMMARY  Patient ID:  Kermit Solis  [id number]  45 y.o.  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    Hepatitis C B19.20    Asthma J45.909    Thrombocytopenia D69.6    H/O 29 wk indicated  delivery O60.10X0    H/O CS x 2 Z98.891    HRP (high risk pregnancy) O09.90    RLTCS 17 M APG 8/9 Wt 7#12 Z98.891    Lost custody of children Z76.3    Opioid dependence with withdrawal ([facility] F11.23    Polysubstance dependence ([address] F19.20    Bipolar I disorder, most recent episode depressed ([address] F31.30    Suicidal ideation R45.851    Severe malnutrition ([address] E43    Cellulitis L03.90    Herpes simplex labialis B00.1    Bipolar disorder ([address] F31.9    Major depressive disorder, recurrent ([address] F33.9        Admission Condition: poor    Discharged Condition: stable    Indication for Admission: threat to self    History of Present Illnes (present tense wording indicates findings from admission exam on 2/15/2020 and are not necessarily indicative of current findings): Hospital Course:   Braeden Akhtar was admitted from the [facility] for depression and being off medications. Upon admission, she was provided a safe secure environment, introduced to unit milieu. Patient participated in groups and individual therapies. Meds were adjusted and she was started on Suboxone. After few days of hospital care, patient began to feel improvement. Depression lifted, thoughts to harm self ceased. Sleep improved, appetite was good.  On morning rounds 2020, patient endorsed feeling ready for discharge. Patient denies suicidal or homicidal ideations, denies hallucinations or delusions. Denies SE's from meds. It was decided that pt had achieved maximum benefit from hospital care and can be discharged to home. Medications were sent to [facility] and Suboxone was called in under Dr. Verito Carlson. Consults:   None    Significant Diagnostic Studies: Routine labs and diagnostics    Treatments: Psychotropic medications, therapy with group, milieu, and 1:1 with nurses, social workers, PABreannaC/CNP, and Attending physician. Discharge Medications:  Current Discharge Medication List      START taking these medications    Details   buprenorphine-naloxone (SUBOXONE) 8-2 MG FILM SL film Place 1 Film under the tongue 2 times daily for 8 doses. Qty: 8 Film, Refills: 0    Associated Diagnoses: Bipolar I disorder, most recent episode depressed (HCC)      hydrOXYzine (ATARAX) 25 MG tablet Take 1 tablet by mouth 3 times daily as needed for Itching or Anxiety  Qty: 42 tablet, Refills: 0         CONTINUE these medications which have CHANGED    Details   !! QUEtiapine (SEROQUEL) 300 MG tablet Take 1 tablet by mouth nightly  Qty: 14 tablet, Refills: 0      !! QUEtiapine (SEROQUEL) 50 MG tablet Take 1 tablet by mouth 2 times daily at 0800 and 1400  Qty: 28 tablet, Refills: 0      gabapentin (NEURONTIN) 300 MG capsule Take 1 capsule by mouth 3 times daily for 14 days. Qty: 42 capsule, Refills: 0       !! - Potential duplicate medications found. Please discuss with provider.       CONTINUE these medications which have NOT CHANGED    Details   medroxyPROGESTERone (DEPO-PROVERA) 150 MG/ML injection Inject 1 mL into the muscle every 3 months for 1 dose  Qty: 1 each, Refills: 2         STOP taking these medications       TRAZODONE HCL PO Comments:   Reason for Stopping:                  Core Measures statement:   Not applicable    Discharge Exam:  Level of consciousness:  Within normal limits  Appearance:

## 2020-02-21 NOTE — BH NOTE
Pt is pleasant controlled and coperative. Denies thoughts to harm self or others. Pt is discharged today and is pleased. Provide support and reassurance, encourage unit programming anf monitor q 15 mins for safety.

## 2020-02-21 NOTE — GROUP NOTE
Group Therapy Note    Date: 2/21/2020    Group Start Time: 0900  Group End Time: 0920  Group Topic: Community Meeting    [address] [place] [address] A    Eliza Keane    Patient declined to attend community meeting/goal setting group despite encouragement from staff. 1:1 talk time offered as alternative to group session.      Signature:  Eliza Keane

## 2020-02-21 NOTE — CARE COORDINATION
Bridge Appointment completed: Reviewed Discharge Instructions with patient. Patient verbalizes understanding and agreement with the discharge plan using the teachback method. Discharge Arrangements: [facility];  Kristal OLIVA     Guardian notified: NA   Discharge destination/address:   Transported by:

## 2020-07-16 ENCOUNTER — HOSPITAL ENCOUNTER (INPATIENT)
Age: 30
LOS: 6 days | Discharge: HOME | DRG: 753 | End: 2020-07-22
Attending: EMERGENCY MEDICINE | Admitting: PSYCHIATRY & NEUROLOGY
Payer: MEDICARE

## 2020-07-16 PROBLEM — F31.9 BIPOLAR 1 DISORDER (HCC): Status: ACTIVE | Noted: 2020-07-16

## 2020-07-16 LAB
SARS-COV-2, PCR: NORMAL
SARS-COV-2, RAPID: NOT DETECTED
SARS-COV-2: NORMAL
SOURCE: NORMAL

## 2020-07-16 PROCEDURE — 6370000000 HC RX 637 (ALT 250 FOR IP): Performed by: EMERGENCY MEDICINE

## 2020-07-16 PROCEDURE — 99285 EMERGENCY DEPT VISIT HI MDM: CPT

## 2020-07-16 PROCEDURE — 1240000000 HC EMOTIONAL WELLNESS R&B

## 2020-07-16 PROCEDURE — U0002 COVID-19 LAB TEST NON-CDC: HCPCS

## 2020-07-16 RX ORDER — AMMONIA INHALANTS 0.04 G/.3ML
0.3 INHALANT RESPIRATORY (INHALATION) ONCE
Status: COMPLETED | OUTPATIENT
Start: 2020-07-16 | End: 2020-07-16

## 2020-07-16 RX ORDER — BUPRENORPHINE AND NALOXONE 8; 2 MG/1; MG/1
1 FILM, SOLUBLE BUCCAL; SUBLINGUAL 2 TIMES DAILY
Status: DISCONTINUED | OUTPATIENT
Start: 2020-07-16 | End: 2020-07-22 | Stop reason: HOSPADM

## 2020-07-16 RX ORDER — QUETIAPINE FUMARATE 300 MG/1
300 TABLET, FILM COATED ORAL NIGHTLY
Status: DISCONTINUED | OUTPATIENT
Start: 2020-07-16 | End: 2020-07-22 | Stop reason: HOSPADM

## 2020-07-16 RX ORDER — LEVETIRACETAM 250 MG/1
250 TABLET ORAL 2 TIMES DAILY
Status: DISCONTINUED | OUTPATIENT
Start: 2020-07-16 | End: 2020-07-20

## 2020-07-16 RX ORDER — BUPRENORPHINE AND NALOXONE 8; 2 MG/1; MG/1
1 FILM, SOLUBLE BUCCAL; SUBLINGUAL 2 TIMES DAILY
Status: ON HOLD | COMMUNITY
End: 2020-07-22 | Stop reason: SDUPTHER

## 2020-07-16 RX ORDER — QUETIAPINE FUMARATE 100 MG/1
100 TABLET, FILM COATED ORAL DAILY
Status: DISCONTINUED | OUTPATIENT
Start: 2020-07-17 | End: 2020-07-22 | Stop reason: HOSPADM

## 2020-07-16 RX ORDER — GABAPENTIN 300 MG/1
300 CAPSULE ORAL 3 TIMES DAILY
Status: DISCONTINUED | OUTPATIENT
Start: 2020-07-16 | End: 2020-07-22 | Stop reason: HOSPADM

## 2020-07-16 RX ORDER — HYDROXYZINE HYDROCHLORIDE 25 MG/1
25 TABLET, FILM COATED ORAL 3 TIMES DAILY PRN
Status: DISCONTINUED | OUTPATIENT
Start: 2020-07-16 | End: 2020-07-22 | Stop reason: HOSPADM

## 2020-07-16 RX ORDER — VILAZODONE HYDROCHLORIDE 10 MG/1
10 TABLET ORAL DAILY
Status: DISCONTINUED | OUTPATIENT
Start: 2020-07-17 | End: 2020-07-22 | Stop reason: HOSPADM

## 2020-07-16 RX ADMIN — AMMONIA INHALANTS 0.3 ML: 0.04 INHALANT RESPIRATORY (INHALATION) at 21:10

## 2020-07-16 ASSESSMENT — ENCOUNTER SYMPTOMS
GASTROINTESTINAL NEGATIVE: 1
RESPIRATORY NEGATIVE: 1

## 2020-07-16 ASSESSMENT — LIFESTYLE VARIABLES: HISTORY_ALCOHOL_USE: NO

## 2020-07-16 ASSESSMENT — PATIENT HEALTH QUESTIONNAIRE - PHQ9: SUM OF ALL RESPONSES TO PHQ QUESTIONS 1-9: 17
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
## 2020-07-16 NOTE — ED PROVIDER NOTES
EMERGENCY DEPARTMENT ENCOUNTER    Pt Name: Keila Ansari  MRN: [id number]  [place] 1990  Date of evaluation: 7/16/20  CHIEF COMPLAINT       Chief Complaint   Patient presents with    Mental Health Problem     Pt states, \"I don't feel safe. I'm trying to do the right thing. \"    Addiction Problem     Pt admits to abusing benzos. HISTORY OF PRESENT ILLNESS   The pt presents for mental health evaluation. She was referred for admission by her psychiatrist.  Pt has been off her meds for six months and has been abusing benzo's such as xanax. No si/hi. Pt is feeling depressed. No attempts at self harm. No new medical complaints. The history is provided by the patient. Mental Health Problem   Presenting symptoms: depression    Degree of incapacity (severity):  Severe  Onset quality:  Gradual  Timing:  Constant  Progression:  Worsening  Chronicity:  Recurrent  Context: drug abuse    Treatment compliance:  Untreated  Relieved by:  Nothing  Worsened by:  Drugs  Ineffective treatments:  None tried      REVIEW OF SYSTEMS     Review of Systems   Constitutional: Negative. HENT: Negative. Respiratory: Negative. Cardiovascular: Negative. Gastrointestinal: Negative. Genitourinary: Negative. Musculoskeletal: Negative. Skin: Negative. Neurological: Negative. All other systems reviewed and are negative.     PASTMEDICAL HISTORY     Past Medical History:   Diagnosis Date    Abnormal Pap smear     Acute kidney failure (HCC)     Anemia     Anxiety     Asthma     B[facility] 1 disorder ([facility]     Cardiac arrest ([facility]     Depression     Dialysis patient ([facility]     Pt taken off approx 10/1/2019    Drug abuse ([facility]     gets opiates from [address] per day    Hepatitis C     Kidney infection     Neurologic disorder     Polysubstance abuse ([facility]     Drug abuse includes Fentanyl, opoiates, benzos/Xanax, cocaine; pt is currently prescribed Suboxone    Postpartum depression     Seizures Drug abuse includes Fentanyl, opoiates, benzos/Xanax, cocaine; pt is currently prescribed Suboxone     PHYSICAL EXAM     INITIAL VITALS: BP 98/61   Pulse 96   Temp 98.7 °F (37.1 °C) (Oral)   Resp 16   Ht 5' 4\" (1.626 m)   Wt 160 lb (72.6 kg)   LMP  (LMP Unknown)   SpO2 97%   BMI 27.46 kg/m²    Physical Exam  Vitals signs and nursing note reviewed. Constitutional:       Appearance: Normal appearance. HENT:      Head: Normocephalic and atraumatic. Nose: No congestion. Mouth/Throat:      Mouth: Mucous membranes are moist.   Eyes:      Conjunctiva/sclera: Conjunctivae normal.   Neck:      Musculoskeletal: Normal range of motion and neck supple. Cardiovascular:      Rate and Rhythm: Normal rate and regular rhythm. Heart sounds: No murmur. Pulmonary:      Effort: Pulmonary effort is normal.      Breath sounds: Normal breath sounds. Abdominal:      Palpations: Abdomen is soft. Musculoskeletal:         General: No signs of injury. Skin:     General: Skin is warm and dry. Capillary Refill: Capillary refill takes less than 2 seconds. Neurological:      General: No focal deficit present. Mental Status: She is alert and oriented to person, place, and time. Psychiatric:         Mood and Affect: Mood is depressed. Thought Content: Thought content does not include homicidal or suicidal ideation. MEDICAL DECISION MAKING:     Medically cleared. Seen by psych, will admit for further therapy and evaluation. Pt is ready for admission at this time. CRITICAL CARE:       PROCEDURES:    Procedures    DIAGNOSTIC RESULTS   EKG:All EKG's are interpreted by the Emergency Department Physician who either signs or Co-signs this chart in the absence of a cardiologist.        RADIOLOGY:All plain film, CT, MRI, and formal ultrasound images (except ED bedside ultrasound) are read by the radiologist, see reports below, unless otherwisenoted in MDM or here.   No orders to display LABS: All lab results were reviewed by myself, and all abnormals are listed below. Labs Reviewed   COVID-19       EMERGENCY DEPARTMENTCOURSE:         Vitals:    Vitals:    07/16/20 1620 07/16/20 1710   BP: 102/72 98/61   Pulse: 102 96   Resp: 16 16   Temp: 98.5 °F (36.9 °C) 98.7 °F (37.1 °C)   TempSrc: Oral Oral   SpO2: 100% 97%   Weight: 160 lb (72.6 kg) 160 lb (72.6 kg)   Height: 5' 4\" (1.626 m) 5' 4\" (1.626 m)       The patient was given the following medications while in the emergency department:  No orders of the defined types were placed in this encounter. CONSULTS:  None    FINAL IMPRESSION      1. Depression, unspecified depression type          DISPOSITION/PLAN   DISPOSITION Admitted 07/16/2020 06:18:49 PM      PATIENT REFERRED TO:  No follow-up provider specified.   DISCHARGE MEDICATIONS:  New Prescriptions    No medications on file     Ferny Paz MD  Attending Emergency Physician                    Kristin Gaoan MD  07/16/20 Marleny Leo

## 2020-07-16 NOTE — ED NOTES
Pt arrives A+O x 4, GCS = 15, PMS x 4 intact, eupneic, and PWD. Pulse is regular et strong with s1 and s2 heart tones. Lung sounds clear t/o bilat. Pt is calm et cooperative. Pt is having appropriate conversation with this nurse, but the pt provides slow responses while speaking with this RN.      Shahzad Delgado RN  07/16/20 4143
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
## 2020-07-17 PROCEDURE — 6370000000 HC RX 637 (ALT 250 FOR IP): Performed by: PSYCHIATRY & NEUROLOGY

## 2020-07-17 PROCEDURE — 90792 PSYCH DIAG EVAL W/MED SRVCS: CPT | Performed by: PSYCHIATRY & NEUROLOGY

## 2020-07-17 PROCEDURE — 1240000000 HC EMOTIONAL WELLNESS R&B

## 2020-07-17 RX ORDER — ONDANSETRON 4 MG/1
4 TABLET, FILM COATED ORAL EVERY 8 HOURS PRN
Status: DISCONTINUED | OUTPATIENT
Start: 2020-07-17 | End: 2020-07-22 | Stop reason: HOSPADM

## 2020-07-17 RX ORDER — VILAZODONE HYDROCHLORIDE 10 MG/1
10 TABLET ORAL DAILY
Status: ON HOLD | COMMUNITY
End: 2020-07-22 | Stop reason: SDUPTHER

## 2020-07-17 RX ORDER — QUETIAPINE FUMARATE 100 MG/1
100 TABLET, FILM COATED ORAL EVERY MORNING
Status: ON HOLD | COMMUNITY
End: 2020-07-22 | Stop reason: SDUPTHER

## 2020-07-17 RX ORDER — TRAZODONE HYDROCHLORIDE 50 MG/1
50 TABLET ORAL NIGHTLY PRN
Status: DISCONTINUED | OUTPATIENT
Start: 2020-07-17 | End: 2020-07-22 | Stop reason: HOSPADM

## 2020-07-17 RX ORDER — HYDROXYZINE HYDROCHLORIDE 25 MG/1
25 TABLET, FILM COATED ORAL 3 TIMES DAILY PRN
Status: ON HOLD | COMMUNITY
End: 2020-07-22 | Stop reason: SDUPTHER

## 2020-07-17 RX ADMIN — GABAPENTIN 300 MG: 300 CAPSULE ORAL at 20:56

## 2020-07-17 RX ADMIN — QUETIAPINE FUMARATE 300 MG: 300 TABLET ORAL at 00:42

## 2020-07-17 RX ADMIN — BUPRENORPHINE AND NALOXONE 1 FILM: 8; 2 FILM BUCCAL; SUBLINGUAL at 12:51

## 2020-07-17 RX ADMIN — GABAPENTIN 300 MG: 300 CAPSULE ORAL at 00:42

## 2020-07-17 RX ADMIN — GABAPENTIN 300 MG: 300 CAPSULE ORAL at 15:36

## 2020-07-17 RX ADMIN — LEVETIRACETAM 250 MG: 250 TABLET ORAL at 00:43

## 2020-07-17 RX ADMIN — TRAZODONE HYDROCHLORIDE 50 MG: 50 TABLET ORAL at 20:56

## 2020-07-17 RX ADMIN — LEVETIRACETAM 250 MG: 250 TABLET ORAL at 20:56

## 2020-07-17 RX ADMIN — QUETIAPINE FUMARATE 300 MG: 300 TABLET ORAL at 20:56

## 2020-07-17 RX ADMIN — TRAZODONE HYDROCHLORIDE 50 MG: 50 TABLET ORAL at 00:42

## 2020-07-17 RX ADMIN — BUPRENORPHINE AND NALOXONE 1 FILM: 8; 2 FILM BUCCAL; SUBLINGUAL at 20:56

## 2020-07-17 ASSESSMENT — SLEEP AND FATIGUE QUESTIONNAIRES
DO YOU USE A SLEEP AID: NO
DO YOU HAVE DIFFICULTY SLEEPING: NO

## 2020-07-17 NOTE — GROUP NOTE
Group Therapy Note    Date: 7/17/2020    Group Start Time: 0900  Group End Time: 0915  Group Topic: Community Meeting    [facility]    [place], [place]        Group Therapy Note    Attendees: 9/21         Pt did not attend [employer] d/t resting in room despite staff invitation to attend. 1:1 talk time offered as alternative to group session, pt declined.

## 2020-07-17 NOTE — PROGRESS NOTES
Pharmacy Medication History Note      List of current medications patient is taking is complete. Source of information: [facility] DR STEPHEN Pat, OAS    Changes made to medication list:  Medications removed (include reason, ex. therapy complete or physician discontinued, noncompliance):  None    Medications added/doses adjusted:  Adjusted Quetiapine to 100 mg in the morning and 300 mg nightly  Added Hydroxyzine 25 mg three times daily as needed    Other notes (ex. Recent course of antibiotics, Coumadin dosing):  Per Epic, the patient last received Depo-Provera in October 2019. Please let me know if you have any questions about this encounter. Thank you!     Electronically signed by Lesa Mcknight, [facility] on 7/17/2020 at 9:12 AM

## 2020-07-17 NOTE — GROUP NOTE
Group Therapy Note    Date: 7/17/2020    Group Start Time: 1330  Group End Time: 5417  Group Topic: Cognitive Skills    [facility]    [place], [place]        Group Therapy Note    Attendees: 9/20         Pt did not attend RT skills group d/t resting in room despite staff invitation to attend. 1:1 talk time offered as alternative to group session, pt declined.

## 2020-07-17 NOTE — ED NOTES
Paperwork and pt's belongings provided to [facility] staff. Pt escorted to [facility] via two [facility] staff members. Pt cooperative exiting [facility].

## 2020-07-17 NOTE — ED NOTES
Pt transported to [facility] via wheelchair accompanied by [facility] staff. Pt denies any further needs at this time. Pt is eupneic, A&O.         Shanelle Underwood RN  07/16/20 3414
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
## 2020-07-17 NOTE — PLAN OF CARE
Problem: Altered Mood, Deterioration in Function:  Goal: Ability to perform activities of daily living will improve  Description: Ability to perform activities of daily living will improve  7/17/2020 1643 by Raya Gonzales RN  Outcome: Ongoing  Note: Pt has been secluded to her room all day. Will continue to encourage ADL's. Problem: Altered Mood, Deterioration in Function:  Goal: Ability to tolerate increased activity will improve  Description: Ability to tolerate increased activity will improve  7/17/2020 1643 by Raya Gonzales RN  Outcome: Ongoing  Note: Pt has been laying in bed. Pt is responsive but drowsy. Problem: Substance Abuse:  Goal: Absence of drug withdrawal signs and symptoms  Description: Absence of drug withdrawal signs and symptoms  7/17/2020 1643 by Raya Gonzales RN  Outcome: Ongoing  Note: Pt has shows tremors and is diaphoretic. Pt reports having nausea and reports feeling hot.

## 2020-07-17 NOTE — PROGRESS NOTES
Medication History:    New medications: Suboxone    Medications discontinued: none    Changes to dosing: none    Stated allergies: As listed    Other pertinent information: Patient's pharmacy is currently closed and patient is unable to provide history. [facility] shows a 7 day supply of Suboxone filled on 7/15/20 and a 30 day supply of gabapentin filled on 6/24/20.      Thank you,  Skyler Sierra, PharmD, BCPS  [phone]
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
## 2020-07-17 NOTE — GROUP NOTE
Group Therapy Note    Date: 7/17/2020    Group Start Time: 1025  Group End Time: 2487  Group Topic: Psychoeducation    [facility]    [place], [place]        Group Therapy Note    Attendees: 6/20         Pt did not attend RT skills group d/t resting in room despite staff invitation to attend. 1:1 talk time offered as alternative to group session, pt declined.

## 2020-07-17 NOTE — ED NOTES
Writer informed by [facility] staff/SW that the pt was unable to be aroused; Dr. Xochilt Ang notified and present; Dr. Xochilt Ang requested ammonia salt; retrieved by Juan Luna RN; pt responded to the amp when placed under nasal cavity; the pt is now sitting in her recliner; drowsy but alert; pt reports that she ingested benzo's and suboxone prior to arrival. Pt denies further medical concerns at this time.      Edith Han RN  07/16/20 9902

## 2020-07-17 NOTE — H&P
HISTORY and Le[facility] DENIZ [facility] [id number]       NAME:  Talita Perez  MRN: [id number]   YOB: 1990   Date: 7/17/2020   Age: 27 y.o. Gender: female     COMPLAINT AND PRESENT HISTORY:      Talita Perez is 27 y.o.,  female, admitted because of depression/ Bipolar 1 Disorder. According to ED notes, patient comes in after being off of her medications for 6 months and has been abusing benzos including Xanax. Patient reports feeling depressed and wanting to come in to withdraw. Upon speaking to patient she relates that she is in withdrawals she is complaining of somatic pain such as stomachache,  chills and diarrhea. Patient is very lethargic and appears to be weak. Patient has been seclusive in her room. Patient does admit to being on benzos for at least 6 months now and states that she has been off of them close to 2 days now. Patient denies any alcohol or any other substance abuse except for  benzos. Patient states that living arrangements after discharge will be to live with her mom. Patient unable to verbalize much due to her withdrawal state. No significant lab values or procedures. No  chest pain or  shortness of breath. No fever. Please see patient's psychiatric hx for more information.     DIAGNOSTIC RESULTS       PAST MEDICAL HISTORY     Past Medical History:   Diagnosis Date    Abnormal Pap smear     Acute kidney failure (HCC)     Anemia     Anxiety     Asthma     Bipolar 1 disorder ([address]     Cardiac arrest ([address]     Depression     Dialysis patient ([facility] [address]     Pt taken off approx 10/1/2019    Drug abuse ([address]     gets opiates from [address] per day    Hepatitis C     Kidney infection     Neurologic disorder     Polysubstance abuse ([address]     Drug abuse includes Fentanyl, opoiates, benzos/Xanax, cocaine; pt is currently prescribed Suboxone    Postpartum depression     Seizures ([address]     only when detoxing    Ulceration     to right foot Temp 97.8 °F (36.6 °C) (Oral)   Resp 14   Ht 5' 4\" (1.626 m)   Wt 158 lb (71.7 kg)   LMP  (LMP Unknown)   SpO2 98%   BMI 27.12 kg/m²  Body mass index is 27.12 kg/m². Pt was examined with a nurse present in the room. GENERAL APPEARANCE:  Sarina Stout is 27 y.o.,  female, mildly obese, nourished, conscious, alert. Patient appears to be in some distress. Patient somewhat lethargic. SKIN:  Warm, dry, no cyanosis or jaundice. HEAD:  Normocephalic, atraumatic, no swelling or tenderness. EYES:  Pupils equal, reactive to light, Conjunctiva is clear, EOMs intact ramin. eyelids WNL. EARS:  No discharge, no marked hearing loss. NOSE:  No rhinorrhea, epistaxis or septal deformity. THROAT:  Not congested. No ulceration bleeding or discharge. NECK:  No stiffness, trachea central.  No palpable masses or L.N.      CHEST:  Symmetrical and equal on expansion. HEART:  Regular rate and rhythm. S1 > S2, No audible murmurs or gallops. LUNGS:  Equal on expansion, normal breath sounds. No adventitious sounds. ABDOMEN:  Mildly obese. Soft on palpation. No localized tenderness. No guarding or rigidity. No palpable organomegaly. LYMPHATICS:  No palpable cervical Lymphadenopathy. LOCOMOTOR, BACK AND SPINE:  No tenderness or deformities. EXTREMITIES:  Symmetrical, no pretibial edema. Maximinos sign negative. No discoloration or ulcerations. NEUROLOGIC:  The patient is conscious, alert, oriented,Cranial nerve II-XII intact, taste and smell were not examined. No apparent focal sensory or motor deficits. Muscle strength equal Ramin. No facial droop, tongue protrudes centrally, no slurring of the speech. PROVISIONAL DIAGNOSES:      Principal Problem:    Bipolar 1 disorder ([facility] [address]  Active Problems:    Depression  Resolved Problems:    * No resolved hospital problems.  *      TOMY MOORE - CNP on 7/17/2020 at 2:10 PM

## 2020-07-18 PROCEDURE — 6370000000 HC RX 637 (ALT 250 FOR IP): Performed by: PSYCHIATRY & NEUROLOGY

## 2020-07-18 PROCEDURE — 1240000000 HC EMOTIONAL WELLNESS R&B

## 2020-07-18 RX ADMIN — BUPRENORPHINE AND NALOXONE 1 FILM: 8; 2 FILM BUCCAL; SUBLINGUAL at 21:05

## 2020-07-18 RX ADMIN — TRAZODONE HYDROCHLORIDE 50 MG: 50 TABLET ORAL at 21:04

## 2020-07-18 RX ADMIN — BUPRENORPHINE AND NALOXONE 1 FILM: 8; 2 FILM BUCCAL; SUBLINGUAL at 08:18

## 2020-07-18 RX ADMIN — GABAPENTIN 300 MG: 300 CAPSULE ORAL at 13:45

## 2020-07-18 RX ADMIN — LEVETIRACETAM 250 MG: 250 TABLET ORAL at 08:17

## 2020-07-18 RX ADMIN — LEVETIRACETAM 250 MG: 250 TABLET ORAL at 21:05

## 2020-07-18 RX ADMIN — GABAPENTIN 300 MG: 300 CAPSULE ORAL at 08:17

## 2020-07-18 RX ADMIN — GABAPENTIN 300 MG: 300 CAPSULE ORAL at 21:04

## 2020-07-18 RX ADMIN — QUETIAPINE FUMARATE 300 MG: 300 TABLET ORAL at 21:05

## 2020-07-18 RX ADMIN — VILAZODONE HYDROCHLORIDE 10 MG: 10 TABLET ORAL at 08:17

## 2020-07-18 RX ADMIN — QUETIAPINE FUMARATE 100 MG: 100 TABLET ORAL at 08:18

## 2020-07-18 NOTE — PLAN OF CARE
Problem: Altered Mood, Deterioration in Function:  Goal: Ability to perform activities of daily living will improve  Description: Ability to perform activities of daily living will improve  7/17/2020 2103 by Seb Lamb RN  Outcome: Ongoing   Problem: Altered Mood, Deterioration in Function:  Goal: Ability to tolerate increased activity will improve  Description: Ability to tolerate increased activity will improve  7/17/2020 2103 by Seb Lamb RN  Outcome: Ongoing   Patient states they are not having thoughts of self harm at this time and verbally agrees to seek staff if feelings of harming self arise,patient reports withdrawal symptoms of stomach cramps,body ache,nausea,PRN meds given pt tolerated well. Patient behavior controlled and accepting of medications,flat,denies audio hallucination and visual hallucinations patient eating and sleeping well. Patient spending long intervals in bed and reports due to the withdrawals  Staff will continue to monitor for safety and offer support as needed.

## 2020-07-18 NOTE — GROUP NOTE
Group Therapy Note    Date: 7/18/2020    Group Start Time: 1330  Group End Time: 1430  Group Topic: Music Therapy    MITCH Rivas        Group Therapy Note    Pt did not attend music therapy group d/t resting in room despite staff invitation to attend. 1:1 talk time offered as alternative to group session, pt declined.

## 2020-07-18 NOTE — VIRTUAL HEALTH
Department of Psychiatry  Attending Progress Note  Chief Complaint: Bipolar 1 disorder [facility])     SUBJECTIVE:  Patient is a 44-year-old female who was well until emitted from the [facility] on 10/16/2020 for withdrawals from Xanax on the street and feeling unsafe. Today the patient is seen via virtual health with a nurse present. Patient is lying in bed with her eyes covered. She reports that she is poorly today, states she is going through withdrawal.  She reports that she been doing 6 mg daily on the street of Xanax and weaned herself down to 2-1/2 mg daily. She denies any suicidal ideation today. She is hard to understand, very low volume. Clinician asked to ask patient several times to repeat herself. She denies side effects from medications at this time. she continues to endorse feeling hopeless and helpless, feeling down. Reports she is anxious when she is out on the unit. Charting medications have been reviewed. Pathetic listening utilized, psychotherapy provided, and patient's concerns listened to and addressed. At this time there is no safe alternative other than hospitalization as patient cannot contract for safety outside of the hospital.    OBJECTIVE    Physical  /60   Pulse 69   Temp 98.1 °F (36.7 °C) (Oral)   Resp 14   Ht 5' 4\" (1.626 m)   Wt 158 lb (71.7 kg)   LMP  (LMP Unknown)   SpO2 98%   BMI 27.12 kg/m²      Mental Status Evaluation:  Orientation: alertness: alert   Mood:. anxious, decreased range and depressed      Affect:  constricted and mood-congruent      Appearance:  age appropriate and disheveled   Activity:  Psychomotor Retardation   Gait/Posture: Normal   Speech:  Spontaneous, low volume, slow rate   Thought Process:  within normal limits   Thought Content:  Denies suicidal or homicidal ideation, denies auditory visualizations.    Sensorium:  person, place and situation   Cognition:  grossly intact   Memory: intact   Insight:  fair   Judgment: fair   Suicidal Ideations: denies suicidal ideation   Homicidal Ideations: Negative for homicidal ideation      Medication Side Effects: absent       Attention Span attention span appeared shorter than expected for age       Labs  Recent Results (from the past 67 hour(s))   COVID-19    Collection Time: 07/16/20  8:23 PM    Specimen: Other   Result Value Ref Range    SARS-CoV-2          SARS-CoV-2, Rapid Not Detected Not Detected    Source . NASOPHARYNGEAL SWAB     SARS-CoV-2, PCR             Medications  Current Facility-Administered Medications   Medication Dose Route Frequency Provider Last Rate Last Dose    traZODone (DESYREL) tablet 50 mg  50 mg Oral Nightly PRN Raffi CORADO MD   50 mg at 07/17/20 2056    ondansetron (ZOFRAN) tablet 4 mg  4 mg Oral Q8H PRN TOMY Parish - CNP        gabapentin (NEURONTIN) capsule 300 mg  300 mg Oral TID Raffi CORADO MD   300 mg at 07/18/20 0817    hydrOXYzine (ATARAX) tablet 25 mg  25 mg Oral TID PRN Sury Mcknight MD        QUEtiapine (SEROQUEL) tablet 300 mg  300 mg Oral Nightly Jani CORADO MD   300 mg at 07/17/20 2056    QUEtiapine (SEROQUEL) tablet 100 mg  100 mg Oral Daily Jani CORADO MD   100 mg at 07/18/20 0818    vilazodone HCl (VIIBRYD) TABS 10 mg  10 mg Oral Daily Raffi CORADO MD   10 mg at 07/18/20 0817    levETIRAcetam (KEPPRA) tablet 250 mg  250 mg Oral BID Jani CORADO MD   250 mg at 07/18/20 0817    buprenorphine-naloxone (SUBOXONE) 8-2 MG SL film 1 Film  1 Film Sublingual BID Sury Mcknight MD   1 Film at 07/18/20 0818         gabapentin  300 mg Oral TID    QUEtiapine  300 mg Oral Nightly    QUEtiapine  100 mg Oral Daily    vilazodone HCl  10 mg Oral Daily    levETIRAcetam  250 mg Oral BID    buprenorphine-naloxone  1 Film Sublingual BID       ASSESSMENT  Bipolar 1 disorder ([facility] [address]     Patient's Response to Treatment: slowly positive    PLAN    · Continue inpatient psychiatric treatment  · Supportive therapy

## 2020-07-18 NOTE — GROUP NOTE
Group Therapy Note    Date: 7/18/2020    Group Start Time: 0900  Group End Time: 0930  Group Topic: Community Meeting    MITCH Fraire        Group Therapy Note    Pt did not attend community meeting group d/t resting in room despite staff invitation to attend. 1:1 talk time offered as alternative to group session, pt declined.
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
## 2020-07-18 NOTE — GROUP NOTE
Group Therapy Note    Date: 7/18/2020    Group Start Time: 1000  Group End Time: 4185  Group Topic: Psychoeducation    [facility]    [address], [place]        Group Therapy Note    Attendees: 12/20      Pt denied 1:1. Despite staff encouragement, pt denied 10:00am psychoeducation group.               Signature:  [address], [place]

## 2020-07-19 PROCEDURE — 1240000000 HC EMOTIONAL WELLNESS R&B

## 2020-07-19 PROCEDURE — 6370000000 HC RX 637 (ALT 250 FOR IP): Performed by: PSYCHIATRY & NEUROLOGY

## 2020-07-19 RX ADMIN — GABAPENTIN 300 MG: 300 CAPSULE ORAL at 08:25

## 2020-07-19 RX ADMIN — HYDROXYZINE HYDROCHLORIDE 25 MG: 25 TABLET, FILM COATED ORAL at 17:53

## 2020-07-19 RX ADMIN — TRAZODONE HYDROCHLORIDE 50 MG: 50 TABLET ORAL at 20:37

## 2020-07-19 RX ADMIN — QUETIAPINE FUMARATE 300 MG: 300 TABLET ORAL at 20:37

## 2020-07-19 RX ADMIN — GABAPENTIN 300 MG: 300 CAPSULE ORAL at 13:22

## 2020-07-19 RX ADMIN — GABAPENTIN 300 MG: 300 CAPSULE ORAL at 20:37

## 2020-07-19 RX ADMIN — LEVETIRACETAM 250 MG: 250 TABLET ORAL at 20:37

## 2020-07-19 RX ADMIN — BUPRENORPHINE AND NALOXONE 1 FILM: 8; 2 FILM BUCCAL; SUBLINGUAL at 08:24

## 2020-07-19 RX ADMIN — QUETIAPINE FUMARATE 100 MG: 100 TABLET ORAL at 08:25

## 2020-07-19 RX ADMIN — LEVETIRACETAM 250 MG: 250 TABLET ORAL at 08:41

## 2020-07-19 RX ADMIN — VILAZODONE HYDROCHLORIDE 10 MG: 10 TABLET ORAL at 08:24

## 2020-07-19 RX ADMIN — BUPRENORPHINE AND NALOXONE 1 FILM: 8; 2 FILM BUCCAL; SUBLINGUAL at 21:38

## 2020-07-19 NOTE — PLAN OF CARE
Problem: Altered Mood, Deterioration in Function:  Goal: Ability to perform activities of daily living will improve  Description: Ability to perform activities of daily living will improve  7/18/2020 2136 by Andra Contreras RN  Outcome: Met This Shift  Note: Patient showered, at evening snack, took evening medications without prompting, pleasant and cooperative with evening treatment, stated she's ready to go, wants to talk with Dr tomorrow about discharge plan    Problem: Altered Mood, Deterioration in Function:  Goal: Ability to tolerate increased activity will improve  Description: Ability to tolerate increased activity will improve  7/18/2020 2136 by Andra Contreras RN  Outcome: Met This Shift  Note: Patient in dayroom majority of shift, showered and was interacting with her peers. Problem: Substance Abuse:  Goal: Absence of drug withdrawal signs and symptoms  Description: Absence of drug withdrawal signs and symptoms  Outcome: Met This Shift  Note: Reports she is feeling well and has no signs of withdrawal, she is sweating slightly but the patient states this is baseline for     Problem: Substance Abuse:  Goal: Participates in care planning  Description: Participates in care planning  7/18/2020 2136 by Andra Contreras RN  Outcome: Met This Shift  Note: Patient participated appropriately in all treatment activities this afternoon. Problem: Substance Abuse:  Goal: Participates in care planning  Description: Participates in care planning  7/18/2020 2136 by Andra Contreras RN  Outcome: Met This Shift  Note: Patient participated appropriately in all treatment activities this afternoon.

## 2020-07-19 NOTE — GROUP NOTE
Group Therapy Note    Date: 7/19/2020    Group Start Time: 0900  Group End Time: 0915  Group Topic: Community Meeting    [facility]    [place]        Group Therapy Note    Pt did not attend [employer] group d/t resting in room despite staff invitation to attend. 1:1 talk time offered as alternative to group session, pt declined.

## 2020-07-19 NOTE — PLAN OF CARE
Problem: Altered Mood, Deterioration in Function:  Goal: Ability to perform activities of daily living will improve  Description: Ability to perform activities of daily living will improve  7/19/2020 1022 by Isaias Hyde LPN  Outcome: Ongoing   Patient isolative to room at times, out for meals and needs. Denies thoughts of harming themself and verbally agrees to remain safe while on the unit. Pt is visible in the milieu social with  select staff and peers. Encouraged to attend groups, accepts all medications. Patient is getting adequate amount of nutrition and sleep. Q15 minute safety checks maintained. Will continue to monitor. Problem: Substance Abuse:  Goal: Absence of drug withdrawal signs and symptoms  Description: Absence of drug withdrawal signs and symptoms  7/19/2020 1022 by Isaias Hyde LPN  Outcome: Ongoing   Patient slightly sweats due to withdrawals. Will continue to monitor. Problem: Substance Abuse:  Goal: Participates in care planning  Description: Participates in care planning  7/19/2020 1022 by Isaias Hyde LPN  Outcome: Ongoing   Patient encouraged to attend all group sessions.

## 2020-07-19 NOTE — GROUP NOTE
Group Therapy Note    Date: 7/19/2020    Group Start Time: 1335  Group End Time: 7642  Group Topic: Music Therapy    [facility]        Group Therapy Note    Pt did not attend music therapy skills group d/t resting in room despite staff invitation to attend. 1:1 talk time offered as alternative to group session, pt declined.

## 2020-07-19 NOTE — GROUP NOTE
Group Therapy Note    Date: 7/19/2020    Group Start Time: 1000  Group End Time: 4731  Group Topic: Psychoeducation    SU[facility]JINDER Arthur        Group Therapy Note    Attendees: 10/20      Pt denied 1:1. Despite staff encouragement, pt denied 10:00am psychoeducation group.             Signature:  SUKHJINDER Montanez

## 2020-07-20 PROCEDURE — 99232 SBSQ HOSP IP/OBS MODERATE 35: CPT | Performed by: PSYCHIATRY & NEUROLOGY

## 2020-07-20 PROCEDURE — 6370000000 HC RX 637 (ALT 250 FOR IP): Performed by: PSYCHIATRY & NEUROLOGY

## 2020-07-20 PROCEDURE — 1240000000 HC EMOTIONAL WELLNESS R&B

## 2020-07-20 RX ORDER — LEVETIRACETAM 500 MG/1
500 TABLET ORAL 2 TIMES DAILY
Status: DISCONTINUED | OUTPATIENT
Start: 2020-07-20 | End: 2020-07-22 | Stop reason: HOSPADM

## 2020-07-20 RX ORDER — PRAZOSIN HYDROCHLORIDE 1 MG/1
1 CAPSULE ORAL 2 TIMES DAILY
Status: DISCONTINUED | OUTPATIENT
Start: 2020-07-20 | End: 2020-07-22 | Stop reason: HOSPADM

## 2020-07-20 RX ADMIN — HYDROXYZINE HYDROCHLORIDE 25 MG: 25 TABLET, FILM COATED ORAL at 09:59

## 2020-07-20 RX ADMIN — QUETIAPINE FUMARATE 300 MG: 300 TABLET ORAL at 21:39

## 2020-07-20 RX ADMIN — PRAZOSIN HYDROCHLORIDE 1 MG: 1 CAPSULE ORAL at 21:39

## 2020-07-20 RX ADMIN — TRAZODONE HYDROCHLORIDE 50 MG: 50 TABLET ORAL at 21:39

## 2020-07-20 RX ADMIN — QUETIAPINE FUMARATE 100 MG: 100 TABLET ORAL at 09:18

## 2020-07-20 RX ADMIN — GABAPENTIN 300 MG: 300 CAPSULE ORAL at 09:17

## 2020-07-20 RX ADMIN — VILAZODONE HYDROCHLORIDE 10 MG: 10 TABLET ORAL at 09:18

## 2020-07-20 RX ADMIN — BUPRENORPHINE AND NALOXONE 1 FILM: 8; 2 FILM BUCCAL; SUBLINGUAL at 21:39

## 2020-07-20 RX ADMIN — LEVETIRACETAM 500 MG: 500 TABLET ORAL at 21:39

## 2020-07-20 RX ADMIN — PRAZOSIN HYDROCHLORIDE 1 MG: 1 CAPSULE ORAL at 09:18

## 2020-07-20 RX ADMIN — LEVETIRACETAM 500 MG: 500 TABLET ORAL at 09:18

## 2020-07-20 RX ADMIN — GABAPENTIN 300 MG: 300 CAPSULE ORAL at 21:40

## 2020-07-20 RX ADMIN — HYDROXYZINE HYDROCHLORIDE 25 MG: 25 TABLET, FILM COATED ORAL at 21:40

## 2020-07-20 RX ADMIN — GABAPENTIN 300 MG: 300 CAPSULE ORAL at 15:14

## 2020-07-20 RX ADMIN — BUPRENORPHINE AND NALOXONE 1 FILM: 8; 2 FILM BUCCAL; SUBLINGUAL at 09:18

## 2020-07-20 NOTE — GROUP NOTE
Group Therapy Note    Date: 7/20/2020    Group Start Time: 1100  Group End Time: 1200  Group Topic: Psychotherapy    [facility] JUNE [place], [facility]        Group Therapy Note    Patient declined to attend psychotherapy group at 1100AM despite encouragement by staff. 1:1 was offered as an alternative.         Signature:  JUNE Solis, [facility]

## 2020-07-20 NOTE — GROUP NOTE
Group Therapy Note    Date: 7/20/2020    Group Start Time: 1330  Group End Time: 1733  Group Topic: Recreational    [address] [facility], [facility] [[email or web address]    Patient refused to attend Recreational Therapy Group at 1330 after encouragement from staff. 1:1 talk time offered.     Signature:  Marizol Rodriguez

## 2020-07-20 NOTE — PLAN OF CARE
Problem: Altered Mood, Deterioration in Function:  Goal: Ability to perform activities of daily living will improve  Description: Ability to perform activities of daily living will improve  Outcome: Ongoing     Problem: Altered Mood, Deterioration in Function:  Goal: Ability to tolerate increased activity will improve  Description: Ability to tolerate increased activity will improve  Outcome: Ongoing    Patient has been isolative to room/bed, sleeping for long intervals. Controlled and cooperative. Does not attend any groups. Comes out for meals only. Does take medications. Aloof and asocial. Denies any suicidal thoughts or hallucinations at this time.

## 2020-07-20 NOTE — GROUP NOTE
Group Therapy Note    Date: 7/20/2020    Group Start Time: 1430  Group End Time: 1095  Group Topic: Cognitive Skills    MITCH Alas, [address]        Group Therapy Note    Attendees: 9/20         Pt did not attend RT skills group d/t resting in room despite staff invitation to attend. 1:1 talk time offered as alternative to group session, pt declined.

## 2020-07-21 PROCEDURE — 6370000000 HC RX 637 (ALT 250 FOR IP): Performed by: PSYCHIATRY & NEUROLOGY

## 2020-07-21 PROCEDURE — 1240000000 HC EMOTIONAL WELLNESS R&B

## 2020-07-21 PROCEDURE — 99232 SBSQ HOSP IP/OBS MODERATE 35: CPT | Performed by: PSYCHIATRY & NEUROLOGY

## 2020-07-21 RX ADMIN — PRAZOSIN HYDROCHLORIDE 1 MG: 1 CAPSULE ORAL at 08:30

## 2020-07-21 RX ADMIN — VILAZODONE HYDROCHLORIDE 10 MG: 10 TABLET ORAL at 08:30

## 2020-07-21 RX ADMIN — BUPRENORPHINE AND NALOXONE 1 FILM: 8; 2 FILM BUCCAL; SUBLINGUAL at 08:30

## 2020-07-21 RX ADMIN — BUPRENORPHINE AND NALOXONE 1 FILM: 8; 2 FILM BUCCAL; SUBLINGUAL at 22:07

## 2020-07-21 RX ADMIN — HYDROXYZINE HYDROCHLORIDE 25 MG: 25 TABLET, FILM COATED ORAL at 14:51

## 2020-07-21 RX ADMIN — TRAZODONE HYDROCHLORIDE 50 MG: 50 TABLET ORAL at 22:07

## 2020-07-21 RX ADMIN — GABAPENTIN 300 MG: 300 CAPSULE ORAL at 14:51

## 2020-07-21 RX ADMIN — HYDROXYZINE HYDROCHLORIDE 25 MG: 25 TABLET, FILM COATED ORAL at 08:48

## 2020-07-21 RX ADMIN — GABAPENTIN 300 MG: 300 CAPSULE ORAL at 22:08

## 2020-07-21 RX ADMIN — QUETIAPINE FUMARATE 300 MG: 300 TABLET ORAL at 22:07

## 2020-07-21 RX ADMIN — PRAZOSIN HYDROCHLORIDE 1 MG: 1 CAPSULE ORAL at 22:06

## 2020-07-21 RX ADMIN — QUETIAPINE FUMARATE 100 MG: 100 TABLET ORAL at 08:30

## 2020-07-21 RX ADMIN — LEVETIRACETAM 500 MG: 500 TABLET ORAL at 22:06

## 2020-07-21 RX ADMIN — GABAPENTIN 300 MG: 300 CAPSULE ORAL at 08:30

## 2020-07-21 RX ADMIN — LEVETIRACETAM 500 MG: 500 TABLET ORAL at 08:30

## 2020-07-21 NOTE — GROUP NOTE
Group Therapy Note    Date: 7/21/2020    Group Start Time: 1330  Group End Time: 0711  Group Topic: Cognitive Skills    [facility]    [place], [place]        Group Therapy Note    Attendees: 10/20         Pt did not attend RT skills group d/t resting in room despite staff invitation to attend. 1:1 talk time offered as alternative to group session, pt declined.

## 2020-07-21 NOTE — PLAN OF CARE
Problem: Altered Mood, Deterioration in Function:  Goal: Ability to perform activities of daily living will improve  Description: Ability to perform activities of daily living will improve  7/21/2020 0412 by Jasson Farrell RN  Note: Pt out and social this evening. PT took shower and washed her clothing. PT currently denies any suicidal thoughts and feels she might be ready to go home in the morning. PT maintained on 15 min safety checks and rounds at irregular intervals. Problem: Substance Abuse:  Goal: Absence of drug withdrawal signs and symptoms  Description: Absence of drug withdrawal signs and symptoms  Note: Pt currently denies any withdrawal symptoms.

## 2020-07-21 NOTE — GROUP NOTE
Group Therapy Note    Date: 7/21/2020    Group Start Time: 0900  Group End Time: 0915  Group Topic: Community Meeting    [facility]RI[facility] JHONATAN Saini        Group Therapy Note    Attendees: 7/21         Patient's Goal:  To orient to unit and set daily goal    Notes:  Pt attended and participated in group. Daily goal: Go to groups and continue following med routine    Status After Intervention:  Improved    Participation Level:  Active Listener and Interactive    Participation Quality: Appropriate, Attentive and Sharing      Speech:  normal      Thought Process/Content: Logical  Linear      Affective Functioning: Congruent      Mood: euthymic      Level of consciousness:  Alert and Attentive      Response to Learning: Able to verbalize current knowledge/experience and Progressing to goal      Endings: None Reported    Modes of Intervention: Education, Support and Reality-testing      Discipline Responsible: Psychoeducational Specialist      Signature:  Mellissa Saini

## 2020-07-21 NOTE — PLAN OF CARE
Problem: Altered Mood, Deterioration in Function:  Goal: Ability to perform activities of daily living will improve  Description: Ability to perform activities of daily living will improve  7/21/2020 1045 by Hyun Lutz RN  Outcome: Ongoing   1:1 with pt x ten minutes. Pt encouraged to attend unit programming and interact with peers and staff. Pt also encouraged to tend to hygiene and ADLs. Pt encouraged to discuss feelings with staff and feedback and reassurance provided. Pt denies thoughts of self harm and is agreeable to seeking out should thoughts of self harm arise. Safe environment maintained. Q15 minute checks for safety cont per unit policy. Will cont to monitor for safety and provides support and reassurance as needed. Pt is controlled in behaviors. Compliant with medications and shows no side effects.  Pt is hopeful for discharge in AM.

## 2020-07-21 NOTE — GROUP NOTE
Group Therapy Note    Date: 7/21/2020    Group Start Time: 1000  Group End Time: 3993  Group Topic: Psychoeducation    [facility]    [place], [address]        Group Therapy Note    Attendees: 3/10         Patient's Goal:  To increase interpersonal interaction. Notes:  Pt attended and participated in group. Status After Intervention:  Improved    Participation Level:  Active Listener and Interactive    Participation Quality: Appropriate and Attentive      Speech:  normal      Thought Process/Content: Logical      Affective Functioning: Blunted      Mood: dysphoric      Level of consciousness:  Alert and Attentive      Response to Learning: Progressing to goal      Endings: None Reported    Modes of Intervention: Socialization, Problem-solving, Activity and Reality-testing      Discipline Responsible: Psychoeducational Specialist      Signature:  Vaughn Dunham Well-developed, well nourished

## 2020-07-21 NOTE — GROUP NOTE
Group Therapy Note    Date: 7/21/2020    Group Start Time: 1100  Group End Time: 4714  Group Topic: Psychotherapy    [facility]    [address], [place]        Group Therapy Note    Attendees: 5/10      Pt denied 1:1. Despite staff encouragement, pt denied 10:00am psychotherapy group.             Signature:  [address], [place]

## 2020-07-22 VITALS
TEMPERATURE: 97.9 F | SYSTOLIC BLOOD PRESSURE: 100 MMHG | OXYGEN SATURATION: 98 % | DIASTOLIC BLOOD PRESSURE: 62 MMHG | WEIGHT: 158 LBS | HEART RATE: 65 BPM | BODY MASS INDEX: 26.98 KG/M2 | RESPIRATION RATE: 14 BRPM | HEIGHT: 64 IN

## 2020-07-22 PROCEDURE — 6370000000 HC RX 637 (ALT 250 FOR IP): Performed by: PSYCHIATRY & NEUROLOGY

## 2020-07-22 PROCEDURE — 99239 HOSP IP/OBS DSCHRG MGMT >30: CPT | Performed by: PSYCHIATRY & NEUROLOGY

## 2020-07-22 RX ORDER — LEVETIRACETAM 500 MG/1
500 TABLET ORAL 2 TIMES DAILY
Qty: 60 TABLET | Refills: 0 | Status: SHIPPED | OUTPATIENT
Start: 2020-07-22 | End: 2020-12-17

## 2020-07-22 RX ORDER — VILAZODONE HYDROCHLORIDE 10 MG/1
10 TABLET ORAL DAILY
Qty: 30 TABLET | Refills: 0 | Status: SHIPPED | OUTPATIENT
Start: 2020-07-22 | End: 2022-07-26 | Stop reason: ALTCHOICE

## 2020-07-22 RX ORDER — GABAPENTIN 300 MG/1
300 CAPSULE ORAL 3 TIMES DAILY
Qty: 90 CAPSULE | Refills: 0 | Status: SHIPPED | OUTPATIENT
Start: 2020-07-22 | End: 2022-08-01 | Stop reason: ALTCHOICE

## 2020-07-22 RX ORDER — HYDROXYZINE HYDROCHLORIDE 25 MG/1
25 TABLET, FILM COATED ORAL 3 TIMES DAILY PRN
Qty: 90 TABLET | Refills: 0 | Status: SHIPPED | OUTPATIENT
Start: 2020-07-22

## 2020-07-22 RX ORDER — BUPRENORPHINE AND NALOXONE 8; 2 MG/1; MG/1
1 FILM, SOLUBLE BUCCAL; SUBLINGUAL 2 TIMES DAILY
Qty: 14 FILM | Refills: 0 | Status: SHIPPED | OUTPATIENT
Start: 2020-07-22 | End: 2020-07-29

## 2020-07-22 RX ORDER — QUETIAPINE FUMARATE 300 MG/1
300 TABLET, FILM COATED ORAL NIGHTLY
Qty: 30 TABLET | Refills: 0 | Status: SHIPPED | OUTPATIENT
Start: 2020-07-22 | End: 2020-12-17 | Stop reason: ALTCHOICE

## 2020-07-22 RX ORDER — PRAZOSIN HYDROCHLORIDE 1 MG/1
1 CAPSULE ORAL 2 TIMES DAILY
Qty: 30 CAPSULE | Refills: 0 | Status: SHIPPED | OUTPATIENT
Start: 2020-07-22 | End: 2021-07-28

## 2020-07-22 RX ORDER — QUETIAPINE FUMARATE 100 MG/1
100 TABLET, FILM COATED ORAL EVERY MORNING
Qty: 30 TABLET | Refills: 0 | Status: SHIPPED | OUTPATIENT
Start: 2020-07-22 | End: 2020-12-17 | Stop reason: ALTCHOICE

## 2020-07-22 RX ORDER — TRAZODONE HYDROCHLORIDE 50 MG/1
50 TABLET ORAL NIGHTLY PRN
Qty: 30 TABLET | Refills: 0 | Status: SHIPPED | OUTPATIENT
Start: 2020-07-22 | End: 2022-07-13

## 2020-07-22 RX ADMIN — QUETIAPINE FUMARATE 100 MG: 100 TABLET ORAL at 08:43

## 2020-07-22 RX ADMIN — PRAZOSIN HYDROCHLORIDE 1 MG: 1 CAPSULE ORAL at 08:43

## 2020-07-22 RX ADMIN — LEVETIRACETAM 500 MG: 500 TABLET ORAL at 08:43

## 2020-07-22 RX ADMIN — VILAZODONE HYDROCHLORIDE 10 MG: 10 TABLET ORAL at 08:43

## 2020-07-22 RX ADMIN — BUPRENORPHINE AND NALOXONE 1 FILM: 8; 2 FILM BUCCAL; SUBLINGUAL at 08:43

## 2020-07-22 RX ADMIN — GABAPENTIN 300 MG: 300 CAPSULE ORAL at 08:43

## 2020-07-22 NOTE — DISCHARGE SUMMARY
DISCHARGE SUMMARY  Patient ID:  Farzana Wheeler  [id number]  52 y.o.  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    Depression F32.9    Hepatitis C B19.20    Asthma J45.909    Thrombocytopenia D69.6    H/O 29 wk indicated  delivery O60.10X0    H/O CS x 2 Z98.891    HRP (high risk pregnancy) O09.90    RLTCS 17 M APG 8 Wt 7#12 Z98.891    Lost custody of children Z76.3    Opioid dependence with withdrawal ([address] F11.23    Polysubstance dependence ([address] F19.20    Bipolar I disorder, most recent episode depressed ([address] F31.30    Suicidal ideation R45.851    Severe malnutrition ([address] E43    Cellulitis L03.90    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 Patient participated in groups and individual therapies. Meds were adjusted. After few days of hospital care, patient began to feel improvement. Depression lifted, thoughts to harm self ceased. Sleep improved, appetite was good. On morning rounds 2020, patient endorsed feeling ready for discharge. Patient denies suicidal or homicidal ideations, denies hallucinations or delusions. Denies SE's from meds.   It was decided that pt had achieved maximum benefit from hospital care and can be discharged     Consults:   None    Significant Diagnostic Studies: Routine labs and diagnostics    Treatments: Psychotropic medications, therapy with group, milieu, and 1:1 with nurses, social workers, PAJEREMY/CNP, and Attending physician. Discharge Medications:  Current Discharge Medication List      START taking these medications    Details   levETIRAcetam (KEPPRA) 500 MG tablet Take 1 tablet by mouth 2 times daily  Qty: 60 tablet, Refills: 0      prazosin (MINIPRESS) 1 MG capsule Take 1 capsule by mouth 2 times daily  Qty: 30 capsule, Refills: 0      traZODone (DESYREL) 50 MG tablet Take 1 tablet by mouth nightly as needed for Sleep  Qty: 30 tablet, Refills: 0         CONTINUE these medications which have CHANGED    Details   buprenorphine-naloxone (SUBOXONE) 8-2 MG FILM SL film Place 1 Film under the tongue 2 times daily for 7 days. Indications: received 7 day supply on 7/15/20 per [facility]RS  Qty: 14 Film, Refills: 0    Comments: [id number]  Associated Diagnoses: Opioid dependence with withdrawal (HCC)      hydrOXYzine (ATARAX) 25 MG tablet Take 1 tablet by mouth 3 times daily as needed for Anxiety  Qty: 90 tablet, Refills: 0      gabapentin (NEURONTIN) 300 MG capsule Take 1 capsule by mouth 3 times daily for 30 days. Qty: 90 capsule, Refills: 0      vilazodone HCl (VILAZODONE HCL) 10 MG TABS Take 1 tablet by mouth daily  Qty: 30 tablet, Refills: 0      !! QUEtiapine (SEROQUEL) 300 MG tablet Take 1 tablet by mouth nightly  Qty: 30 tablet, Refills: 0      !! QUEtiapine (SEROQUEL) 100 MG tablet Take 1 tablet by mouth every morning  Qty: 30 tablet, Refills: 0       !! - Potential duplicate medications found. Please discuss with provider.       CONTINUE these medications which have NOT CHANGED    Details   medroxyPROGESTERone (DEPO-PROVERA) 150 MG/ML injection Inject 1 mL into the muscle every 3 months for 1 dose  Qty: 1 each, Refills: 2 Core Measures statement:   Not applicable    Discharge Exam:  Level of consciousness:  Within normal limits  Appearance: Street clothes, seated, with good grooming  Behavior/Motor: No abnormalities noted  Attitude toward examiner:  Cooperative, attentive, good eye contact  Speech:  spontaneous, normal rate, normal volume and well articulated  Mood:  euthymic  Affect:  mood congruent  Thought processes:  linear, goal directed and coherent  Thought content:  Homocidal ideation denies  Suicidal Ideation:  denies suicidal ideation  Delusions:  no evidence of delusions  Perceptual Disturbance:  denies any perceptual disturbance  Cognition:  In tact  Memory: age appropriate  Insight & Judgement: fair  Medication side effects:  denies     Disposition: home    Patient Instructions: Activity: activity as tolerated    Follow-up as scheduled with Zepf and PCP. Time Spent: 35 minutes    Engagement: Patient displayed a good level of engagement with the treatments offered during this admission. Discharge planning, findings, and recommendations were discussed with  Patient. Signed:  Mary Cameoj   7/22/2020  7:46 AM  Dragon voice recognition software used in portions of this document.

## 2020-07-22 NOTE — BH NOTE
Department of Psychiatry  Attending Progress Note  Chief Complaint: Bipolar 1 disorder ([address]     SUBJECTIVE:    Patient is feeling depressed and sad. She is experiencing auditory hallucinations. She had delusions of persecution. She has trouble with remote relation to memory. She feels hopeless useless. She has suicidal thoughts. She complains of agitation and anxiety. OBJECTIVE    Physical  BP (!) 96/53   Pulse 76   Temp 98 °F (36.7 °C)   Resp 14   Ht 5' 4\" (1.626 m)   Wt 158 lb (71.7 kg)   LMP  (LMP Unknown)   SpO2 98%   BMI 27.12 kg/m²      Mental Status Evaluation:  Patient is cooperative. She has adequate eye contact. She has adequate rapport. She complains of lack of energy and motivation. Her psychomotor activity is decreased. She has adequate eye contact. Her speech is within normal limits. Her mood subjectively sad. Objectively appears to be depressed and has appropriate affect. Thought processes within normal limits. Thought content predominantly of depression sadness lack of energy and motivation. She has suicidal thoughts. She denies any hallucinations or delusions. She is of average intelligence. She is oriented to time place and person. Her memory to recent remote and immediate events are within normal limits. She has poor attention and concentration. Her insight and judgment are impaired. Her abstraction is fair. No results found for this or any previous visit (from the past 72 hour(s)).        Medications  Current Facility-Administered Medications   Medication Dose Route Frequency Provider Last Rate Last Dose    traZODone (DESYREL) tablet 50 mg  50 mg Oral Nightly PRN Naida CORADO MD   50 mg at 07/19/20 2037    ondansetron (ZOFRAN) tablet 4 mg  4 mg Oral Q8H PRN TOMY Gunderson - CNP        gabapentin (NEURONTIN) capsule 300 mg  300 mg Oral TID Naida CORADO MD   300 mg at 07/19/20 2037    hydrOXYzine (ATARAX) tablet 25 mg  25 mg Oral TID
Department of Psychiatry  Attending Progress Note  Chief Complaint: Bipolar 1 disorder ([facility] [address]     SUBJECTIVE:    Patient is feeling depressed and sad. She is experiencing auditory hallucinations. She is experiencing nightmares. She feels hopeless useless worthless. She is traumatic reliving experience she feels hopeless. No thoughts. She complains of agitation and anxiety. She is unable to sleep in the night. She has trouble with attention and concentration. She has no insight. She has poor impulse control. OBJECTIVE    Physical  /72   Pulse 81   Temp 98.1 °F (36.7 °C) (Oral)   Resp 16   Ht 5' 4\" (1.626 m)   Wt 158 lb (71.7 kg)   LMP  (LMP Unknown)   SpO2 98%   BMI 27.12 kg/m²      Mental Status Evaluation:    Patient is cooperative. She is alert oriented to time place and person. Her memory to recent remote and immediate events are within normal limits. CMP) contact. She has adequate rapport. Her psychomotor activity decreased. Her speech is within normal limits. Her mood subjectively sad. Objectively appears to be depressed and has appropriate affect. Thought processes within normal limits. Thought content predominantly of depression sadness agitation and anxiety and restlessness and inability to focus. She has suicidal thoughts. She denies any hallucinations or delusions. She is of average intelligence. She denies any homicidal thoughts or plans. She has poor attention and concentration. Her insight and judgment are impaired. No results found for this or any previous visit (from the past 72 hour(s)).      Medications  Current Facility-Administered Medications   Medication Dose Route Frequency Provider Last Rate Last Dose    prazosin (MINIPRESS) capsule 1 mg  1 mg Oral BID Jani CORADO MD   1 mg at 07/20/20 2139    levETIRAcetam (KEPPRA) tablet 500 mg  500 mg Oral BID Emily CORADO MD   500 mg at 07/20/20 2139    traZODone (DESYREL) tablet 50 mg  50 mg
Despite encouragement from staff patient refused group. One to one talk time offered. Patient refused. Will continue to monitor.
Despite encouragement from staff patient refused group. One to one talk time offered. Patient refused. Will continue to monitor.
LPN documentation reviewed by RN
Patient participated in safety checks. No contraband found.
Patient was seen by Dayana Clifton NP via telehealth with staff present in the room.
Patient was seen by Dr. Shane Soni via Telehealth today.
Patient was seen by Laura Mcdermott NP via telehealth. Staff member was present.
Patient was seen by the provider via telehealth.
Patient was seen by the provider via telehealth.
Pt did not attend 1600 wellness group due to resting in room and choosing not to attend. Pt offered 1:1 talk time and refused.
Pt did not attend 1600 wellness group due to resting in room and choosing not to attend. Pt offered 1:1 talk time and refused.
Pt did not participate in open recreational group at 1430 due to resting in room and choosing to not attend.
RN has reviewed all BHT documentation.
RN reviewed LPN charting
RT ASSESSMENT TREATMENT GOALS    [x]Pt Goal: Pt will identify 1-2 positive coping skills by time of discharge. []Pt Goal: Pt will identify 1-2 positive aspects of self by time of discharge. []Pt Goal: Pt will remain on task/topic for 15-30 minutes during group by time of discharge. [x]Pt Goal: Pt will identify 1-2 aspects of relapse prevention plan by time of discharge. []Pt Goal: Pt will join in conversation with peers 1-2 times per group by time of discharge. []Pt Goal: Pt will identify 1-2 new leisure interests by time of discharge. []Pt Goal: Pt will not voice any delusional content by time of discharge.
past 67 hour(s))   COVID-19    Collection Time: 07/16/20  8:23 PM    Specimen: Other   Result Value Ref Range    SARS-CoV-2          SARS-CoV-2, Rapid Not Detected Not Detected    Source . NASOPHARYNGEAL SWAB     SARS-CoV-2, PCR             SOCIAL HISTORY  Social History     Socioeconomic History    Marital status: Single     Spouse name: Not on file    Number of children: Not on file    Years of education: Not on file    Highest education level: Not on file   Occupational History    Not on file   Social Needs    Financial resource strain: Not on file    Food insecurity     Worry: Not on file     Inability: Not on file    Transportation needs     Medical: Not on file     Non-medical: Not on file   Tobacco Use    Smoking status: Never Smoker    Smokeless tobacco: Never Used   Substance and Sexual Activity    Alcohol use: No     Alcohol/week: 0.0 standard drinks     Comment: prior abuse    Drug use: Yes     Types: Cocaine     Comment: Drug abuse includes Fentanyl, opoiates, benzos/Xanax, cocaine; pt is currently prescribed Suboxone    Sexual activity: Yes     Partners: Male   Lifestyle    Physical activity     Days per week: Not on file     Minutes per session: Not on file    Stress: Not on file   Relationships    Social connections     Talks on phone: Not on file     Gets together: Not on file     Attends Judaism service: Not on file     Active member of club or organization: Not on file     Attends meetings of clubs or organizations: Not on file     Relationship status: Not on file    Intimate partner violence     Fear of current or ex partner: Not on file     Emotionally abused: Not on file     Physically abused: Not on file     Forced sexual activity: Not on file   Other Topics Concern    Not on file   Social History Narrative    Not on file       Review of systems  Constitutional:  negative for chills, fevers, sweats  Respiratory:  negative for cough, dyspnea on exertion, hemoptysis, shortness

## 2020-07-22 NOTE — PROGRESS NOTES
Patient given tobacco quitline number 38637327049 at this time, refusing to call at this time, states \" I just dont want to quit now\"- patient given information as to the dangers of long term tobacco use. Continue to reinforce the importance of tobacco cessation.

## 2020-07-22 NOTE — CARE COORDINATION
review them with you. Where to Get Your Medications      These medications were sent to [facility] [place], [address] [address]    Phone:  [phone]   · gabapentin 300 MG capsule  · hydrOXYzine 25 MG tablet  · levETIRAcetam 500 MG tablet  · prazosin 1 MG capsule  · QUEtiapine 100 MG tablet  · QUEtiapine 300 MG tablet  · traZODone 50 MG tablet  · vilazodone HCl 10 MG Tabs     You can get these medications from any pharmacy    Bring a paper prescription for each of these medications  · buprenorphine-naloxone 8-2 MG Film SL film         Follow Up Appointment: Hilaria Rios  (f) [phone]  (P) [phone]  [place]   [place], [address] [place] [address] [address]    On 7/29/2020  @1:00PM for medication management

## 2020-09-04 ENCOUNTER — TELEPHONE (OUTPATIENT)
Dept: OBGYN CLINIC | Age: 30
End: 2020-09-04

## 2020-09-04 NOTE — TELEPHONE ENCOUNTER
Patient called in requesting script for nuva ring to be sent to pharmacy. Advised patient that she would need to come in for office visit to start birth control. Patient declined appt and wants to just come in to office and leave urine sample. Patient is also requesting medication for bv and yeast she states she is having symptoms. Please advise.

## 2020-09-08 RX ORDER — ETONOGESTREL AND ETHINYL ESTRADIOL 11.7; 2.7 MG/1; MG/1
1 INSERT, EXTENDED RELEASE VAGINAL
Qty: 1 EACH | Refills: 0 | Status: SHIPPED | OUTPATIENT
Start: 2020-09-08 | End: 2020-09-17

## 2020-09-08 RX ORDER — FLUCONAZOLE 100 MG/1
100 TABLET ORAL DAILY
Qty: 7 TABLET | Refills: 0 | Status: SHIPPED | OUTPATIENT
Start: 2020-09-08 | End: 2020-09-15

## 2020-09-08 RX ORDER — METRONIDAZOLE 500 MG/1
500 TABLET ORAL 2 TIMES DAILY
Qty: 14 TABLET | Refills: 0 | Status: SHIPPED | OUTPATIENT
Start: 2020-09-08 | End: 2020-09-15

## 2020-09-17 ENCOUNTER — HOSPITAL ENCOUNTER (OUTPATIENT)
Age: 30
Setting detail: SPECIMEN
Discharge: HOME | End: 2020-09-17
Payer: MEDICARE

## 2020-09-17 ENCOUNTER — OFFICE VISIT (OUTPATIENT)
Dept: OBGYN CLINIC | Age: 30
End: 2020-09-17
Payer: MEDICARE

## 2020-09-17 VITALS
WEIGHT: 166.8 LBS | BODY MASS INDEX: 28.63 KG/M2 | HEART RATE: 83 BPM | DIASTOLIC BLOOD PRESSURE: 65 MMHG | SYSTOLIC BLOOD PRESSURE: 107 MMHG | TEMPERATURE: 98.5 F

## 2020-09-17 LAB
CONTROL: PRESENT
PREGNANCY TEST URINE, POC: NEGATIVE

## 2020-09-17 PROCEDURE — 99213 OFFICE O/P EST LOW 20 MIN: CPT | Performed by: CLINICAL NURSE SPECIALIST

## 2020-09-17 PROCEDURE — 81025 URINE PREGNANCY TEST: CPT | Performed by: CLINICAL NURSE SPECIALIST

## 2020-09-17 PROCEDURE — G8417 CALC BMI ABV UP PARAM F/U: HCPCS | Performed by: CLINICAL NURSE SPECIALIST

## 2020-09-17 PROCEDURE — G8427 DOCREV CUR MEDS BY ELIG CLIN: HCPCS | Performed by: CLINICAL NURSE SPECIALIST

## 2020-09-17 PROCEDURE — 1036F TOBACCO NON-USER: CPT | Performed by: CLINICAL NURSE SPECIALIST

## 2020-09-17 RX ORDER — MEDROXYPROGESTERONE ACETATE 150 MG/ML
150 INJECTION, SUSPENSION INTRAMUSCULAR ONCE
Status: COMPLETED | OUTPATIENT
Start: 2020-09-17 | End: 2020-09-17

## 2020-09-17 RX ORDER — BUPRENORPHINE HYDROCHLORIDE, NALOXONE HYDROCHLORIDE 8; 2 MG/1; MG/1
FILM, SOLUBLE BUCCAL; SUBLINGUAL
COMMUNITY
Start: 2020-09-09

## 2020-09-17 RX ADMIN — MEDROXYPROGESTERONE ACETATE 150 MG: 150 INJECTION, SUSPENSION INTRAMUSCULAR at 16:36

## 2020-09-17 ASSESSMENT — ENCOUNTER SYMPTOMS
ALLERGIC/IMMUNOLOGIC NEGATIVE: 1
EYES NEGATIVE: 1
RESPIRATORY NEGATIVE: 1
GASTROINTESTINAL NEGATIVE: 1

## 2020-09-17 NOTE — PROGRESS NOTES
Subjective:      Patient ID:  Linh Price is a 27 y.o. female who presents for   Chief Complaint   Patient presents with    Contraception    Sexually Transmitted Diseases     std screening    Other     has swelling between anus and vagina , discuss tubal ligation        HPI     Patient is a 28 yo female who presents for STD screening and would like to discuss birth control options. Pt is requesting the birth control patch but would like to be scheduled for a tubal ligation. Pt is sexually active. Review of Systems   Constitutional: Negative for chills and fever. HENT: Negative. Eyes: Negative. Respiratory: Negative. Cardiovascular: Negative. Gastrointestinal: Negative. Endocrine: Negative. Genitourinary: Negative for dysuria, menstrual problem and vaginal discharge. Musculoskeletal: Negative. Skin: Negative. Allergic/Immunologic: Negative. Neurological: Negative. Hematological: Negative. Psychiatric/Behavioral: Negative. /65 (Site: Right Upper Arm, Position: Sitting, Cuff Size: Small Adult)   Pulse 83   Temp 98.5 °F (36.9 °C) (Temporal)   Wt 166 lb 12.8 oz (75.7 kg)   BMI 28.63 kg/m²    No LMP recorded. Patient has had an injection. Objective:   Physical Exam  Vitals signs reviewed. Constitutional:       Appearance: She is well-developed. HENT:      Head: Normocephalic and atraumatic. Eyes:      Conjunctiva/sclera: Conjunctivae normal.   Neck:      Musculoskeletal: Normal range of motion and neck supple. Cardiovascular:      Rate and Rhythm: Normal rate and regular rhythm. Pulmonary:      Effort: Pulmonary effort is normal.      Breath sounds: Normal breath sounds. Abdominal:      General: Bowel sounds are normal.   Genitourinary:     Vagina: Vaginal discharge present. Musculoskeletal: Normal range of motion. Skin:     General: Skin is warm and dry. Neurological:      Mental Status: She is oriented to person, place, and time. Psychiatric:         Behavior: Behavior normal.         Thought Content: Thought content normal.         Judgment: Judgment normal.         Assessment:      Diagnosis Orders   1. Screening for STDs (sexually transmitted diseases)  Chlamydia Trachomatis & Neisseria gonorrhoeae (GC) by amplified detection    VAGINITIS DNA PROBE   2. Birth control counseling  POCT urine pregnancy    medroxyPROGESTERone (DEPO-PROVERA) injection 150 mg   3. Initiation of Depo Provera  medroxyPROGESTERone (DEPO-PROVERA) injection 150 mg   4. Negative pregnancy test             Plan:    Referral to Dr. Neha Cross   Discussed with Dr. Houston Rutherford her case and the recommendation was depo due to her cardiac history. Return for as needed. Patient was seen with total face to face time of 15 minutes. More than 50% of this visit was on counseling andeducation regarding the problems listed below and her options. She was also counseled on her preventative health maintenance recommendations and follow-up.     Electronically signed by: Yung Dotson CNP

## 2020-09-18 LAB
C TRACH DNA GENITAL QL NAA+PROBE: NEGATIVE
DIRECT EXAM: ABNORMAL
Lab: ABNORMAL
N. GONORRHOEAE DNA: NEGATIVE
SPECIMEN DESCRIPTION: ABNORMAL
SPECIMEN DESCRIPTION: NORMAL

## 2020-09-19 RX ORDER — FLUCONAZOLE 100 MG/1
100 TABLET ORAL DAILY
Qty: 7 TABLET | Refills: 0 | Status: SHIPPED | OUTPATIENT
Start: 2020-09-19 | End: 2020-09-26

## 2020-09-19 RX ORDER — METRONIDAZOLE 500 MG/1
500 TABLET ORAL 2 TIMES DAILY
Qty: 14 TABLET | Refills: 0 | Status: SHIPPED | OUTPATIENT
Start: 2020-09-19 | End: 2020-09-26

## 2020-09-23 ENCOUNTER — TELEPHONE (OUTPATIENT)
Dept: OBGYN CLINIC | Age: 30
End: 2020-09-23

## 2020-09-23 NOTE — TELEPHONE ENCOUNTER
Patient said that a medication was supposed to be called in for her vaginal infection. Nothing was sent to the pharmacy the day of her visit. Can it be sent to [facility].    Please advise

## 2020-11-17 ENCOUNTER — TELEPHONE (OUTPATIENT)
Dept: OBGYN CLINIC | Age: 30
End: 2020-11-17

## 2020-11-17 RX ORDER — METRONIDAZOLE 500 MG/1
500 TABLET ORAL 2 TIMES DAILY
Qty: 14 TABLET | Refills: 0 | Status: SHIPPED | OUTPATIENT
Start: 2020-11-17 | End: 2020-11-24

## 2020-11-17 RX ORDER — FLUCONAZOLE 100 MG/1
100 TABLET ORAL DAILY
Qty: 7 TABLET | Refills: 0 | Status: SHIPPED | OUTPATIENT
Start: 2020-11-17 | End: 2020-11-24

## 2020-11-17 NOTE — TELEPHONE ENCOUNTER
Patient called and reported vaginal discharge and odor X1 week. Patient denies vaginal irritation and denies having intercourse. Patient requests for something for the discharge.

## 2020-12-08 ENCOUNTER — NURSE ONLY (OUTPATIENT)
Dept: OBGYN CLINIC | Age: 30
End: 2020-12-08
Payer: MEDICARE

## 2020-12-08 VITALS
WEIGHT: 176.6 LBS | SYSTOLIC BLOOD PRESSURE: 120 MMHG | DIASTOLIC BLOOD PRESSURE: 72 MMHG | HEIGHT: 64 IN | BODY MASS INDEX: 30.15 KG/M2

## 2020-12-08 LAB
CONTROL: NORMAL
PREGNANCY TEST URINE, POC: NEGATIVE

## 2020-12-08 PROCEDURE — 96372 THER/PROPH/DIAG INJ SC/IM: CPT | Performed by: SPECIALIST

## 2020-12-08 PROCEDURE — 81025 URINE PREGNANCY TEST: CPT | Performed by: SPECIALIST

## 2020-12-08 RX ORDER — MEDROXYPROGESTERONE ACETATE 150 MG/ML
150 INJECTION, SUSPENSION INTRAMUSCULAR ONCE
Status: COMPLETED | OUTPATIENT
Start: 2020-12-08 | End: 2020-12-08

## 2020-12-08 RX ADMIN — MEDROXYPROGESTERONE ACETATE 150 MG: 150 INJECTION, SUSPENSION INTRAMUSCULAR at 13:48

## 2020-12-08 NOTE — PROGRESS NOTES
Patient was given Depo Provera in the Left Gluteus Corey. NDC# [id number]  LOT# [id number]  Exp date- 04/01/2022  Patient's last injection was 09/17/2020. Patient's last annual exam was on 10/23/2019. Patient tolerated well without difficulty.

## 2020-12-17 ENCOUNTER — OFFICE VISIT (OUTPATIENT)
Dept: FAMILY MEDICINE CLINIC | Age: 30
End: 2020-12-17
Payer: MEDICARE

## 2020-12-17 VITALS
DIASTOLIC BLOOD PRESSURE: 74 MMHG | TEMPERATURE: 98.3 F | SYSTOLIC BLOOD PRESSURE: 118 MMHG | OXYGEN SATURATION: 96 % | WEIGHT: 177.6 LBS | HEART RATE: 89 BPM | HEIGHT: 64 IN | BODY MASS INDEX: 30.32 KG/M2

## 2020-12-17 PROBLEM — E43 SEVERE MALNUTRITION (HCC): Status: RESOLVED | Noted: 2019-02-14 | Resolved: 2020-12-17

## 2020-12-17 PROCEDURE — 1036F TOBACCO NON-USER: CPT | Performed by: FAMILY MEDICINE

## 2020-12-17 PROCEDURE — G8484 FLU IMMUNIZE NO ADMIN: HCPCS | Performed by: FAMILY MEDICINE

## 2020-12-17 PROCEDURE — G8427 DOCREV CUR MEDS BY ELIG CLIN: HCPCS | Performed by: FAMILY MEDICINE

## 2020-12-17 PROCEDURE — 99204 OFFICE O/P NEW MOD 45 MIN: CPT | Performed by: FAMILY MEDICINE

## 2020-12-17 PROCEDURE — G8417 CALC BMI ABV UP PARAM F/U: HCPCS | Performed by: FAMILY MEDICINE

## 2020-12-17 RX ORDER — DOCUSATE SODIUM 100 MG/1
100 CAPSULE, LIQUID FILLED ORAL 2 TIMES DAILY PRN
Qty: 60 CAPSULE | Refills: 3 | Status: SHIPPED | OUTPATIENT
Start: 2020-12-17 | End: 2022-07-13

## 2020-12-17 RX ORDER — GUANFACINE 1 MG/1
TABLET ORAL
COMMUNITY
Start: 2020-11-29 | End: 2021-07-28

## 2020-12-17 RX ORDER — ALBUTEROL SULFATE 90 UG/1
2 AEROSOL, METERED RESPIRATORY (INHALATION) EVERY 6 HOURS PRN
Qty: 1 INHALER | Refills: 1 | Status: SHIPPED | OUTPATIENT
Start: 2020-12-17

## 2020-12-17 ASSESSMENT — PATIENT HEALTH QUESTIONNAIRE - PHQ9
SUM OF ALL RESPONSES TO PHQ QUESTIONS 1-9: 0
1. LITTLE INTEREST OR PLEASURE IN DOING THINGS: 0
SUM OF ALL RESPONSES TO PHQ QUESTIONS 1-9: 0
2. FEELING DOWN, DEPRESSED OR HOPELESS: 0
SUM OF ALL RESPONSES TO PHQ QUESTIONS 1-9: 0
SUM OF ALL RESPONSES TO PHQ9 QUESTIONS 1 & 2: 0

## 2020-12-17 ASSESSMENT — ENCOUNTER SYMPTOMS
WHEEZING: 0
CHEST TIGHTNESS: 0
ABDOMINAL PAIN: 0
SHORTNESS OF BREATH: 1
VOMITING: 0
DIARRHEA: 0
NAUSEA: 0
ABDOMINAL DISTENTION: 0
COUGH: 0

## 2020-12-17 NOTE — PATIENT INSTRUCTIONS
· Your coughing and wheezing get worse. Follow-up care is a key part of your treatment and safety. Be sure to make and go to all appointments, and call your doctor if you are having problems. It's also a good idea to know your test results and keep a list of the medicines you take. Where can you learn more? Go to [email or web address].[email or web address] org and sign in to your [employer] account. Enter [phone] in the [employer] box to learn more about \"Asthma: Your Action Plan. \"     If you do not have an account, please click on the \"Sign Up Now\" link. Current as of: February 24, 2020               Content Version: 12.6  © 2006-2020 [employer]. Care instructions adapted under license by [facility] ([facility]). If you have questions about a medical condition or this instruction, always ask your healthcare professional. [address] any warranty or liability for your use of this information.

## 2020-12-17 NOTE — PROGRESS NOTES
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  Patient says at that time she was on hemodialysis for 3 months due to kidney failure. Patient says she still needs to help to lift up her left arm which is still weak and has numbness and tingling in it. She reports numbness on the feet and legs for 1 year, since in coma, she was told possibly related to malnutrition and being in a coma, when she woke up she had the symptoms. Patient says she did have seizures at that time but she has stopped keppra due to side effects, she says especially she was short of breath when taking it and having more panic attacks due to age. Patient says since she has stopped Keppra she is not short of breath anymore. Patient reports she was supposed to follow-up with neurologist but she never did. Patient reports last seizure about 1 year ago    Since she has stopped kepra, no seizures in 1 year    MRI brain 8/27/19  COPY [facility]  Was unremarkable  \"IMPRESSION:  *  Unremarkable noncontrast MR Brain. \"    Her weight has been stable    Wt Readings from Last 3 Encounters:   12/17/20 177 lb 9.6 oz (80.6 kg)   12/08/20 176 lb 9.6 oz (80.1 kg)   09/17/20 166 lb 12.8 oz (75.7 kg)     She currently denies chest pain, palpitations.   She says she is short of breath related to her asthma  She does not follow with cardiologist and she never did after the cardiac arrest    8/22/19 [facility] [facility]--ejection fraction when she had cardiac arrest was 25-30%     Result Narrative       Left Ventricle: Left ventricle appears normal in size.   Left Ventricle: Systolic function is severely decreased with an   ejection fraction of 25-30%.   Right Ventricle: Systolic function is moderately to severely reduced.   Right Ventricle: Right ventricular size is moderately dilated. Patient reports she also has hearing loss in the right ear since the stroke. Apparently she did have a hearing evaluation on 9/3/2019, report is in [address] [place] [address], with I am unable to see the report      Bipolar disorder  Patient says she has been seeing Dr. Cindi Recinos for bipolar disorder, ADHD, and PTSD. Patient has bipolar disorder, follows with Dr. Cindi Recinos who is still working with her on her medications. She says she takes prazosin prn when she has too many flash backs  Denies suicidal ideation, plan or intent. She denies hallucinations. PHQ-2 Over the past 2 weeks, how often have you been bothered by any of the following problems? Little interest or pleasure in doing things: Not at all  Feeling down, depressed, or hopeless: Not at all  PHQ-2 Score: 0  PHQ-9 Over the past 2 weeks, how often have you been bothered by any of the following problems? PHQ-9 Total Score: 0     [x]Negative depression screening. PHQ Scores 12/17/2020   PHQ2 Score 0   PHQ9 Score 0         Fatigue: Patient complains of fatigue. Symptoms began 1-2 year ago. Symptoms of her fatigue have been anxiousness, fatigue with paradoxical insomnia, feelings of depression, general malaise and Shortness of breath. Patient describes the following psychologic symptoms: depression, anxiety, bipolar disorder, PTSD. Patient denies fever, significant change in weight, symptoms of arthritis and unusual rashes. Symptoms have progressed to a point and plateaued. Severity has been moderate. Previous visits for this problem: none.           Asthma:  Current treatment includes beta agonist inhalers, combination beta agonists/steroid inhalers, which has been effective. Using preventive medication(s) consistently: no- ran out. Residual symptoms: dyspnea worsening. Patient denies cough, wheezing. She is not on any inhalers for asthma    Counseling given: Yes      Patient has history of substance abuse of multiple illicit drugs and opiates and benzodiazepines. She is currently on Suboxone, reports compliance with weight. She reports constipation from it. She denies abdominal pain, nausea, vomiting, diarrhea  Patient does have hepatitis C. She does not follow with anyone for it. Most recent labs showed thrombocytopenia. She denies easy bruising or prolonged bleeding      -rest of complaints with corresponding details per ROS      Current Outpatient Medications   Medication Sig Dispense Refill    guanFACINE (TENEX) 1 MG tablet TAKE 1 TABLET BY MOUTH EVERY 12 HOURS      SUBOXONE 8-2 MG FILM SL film PLACE 1 STRIP UNDER THE TONGUE TWICE DAILY      hydrOXYzine (ATARAX) 25 MG tablet Take 1 tablet by mouth 3 times daily as needed for Anxiety 90 tablet 0    gabapentin (NEURONTIN) 300 MG capsule Take 1 capsule by mouth 3 times daily for 30 days. 90 capsule 0    vilazodone HCl (VILAZODONE HCL) 10 MG TABS Take 1 tablet by mouth daily 30 tablet 0    traZODone (DESYREL) 50 MG tablet Take 1 tablet by mouth nightly as needed for Sleep 30 tablet 0    levETIRAcetam (KEPPRA) 500 MG tablet Take 1 tablet by mouth 2 times daily (Patient not taking: Reported on 12/17/2020) 60 tablet 0    prazosin (MINIPRESS) 1 MG capsule Take 1 capsule by mouth 2 times daily (Patient not taking: Reported on 12/17/2020) 30 capsule 0     No current facility-administered medications for this visit.         Allergies   Allergen Reactions    Penicillins Hives and Swelling     States throat swells    Buspirone      Other reaction(s): prev tried (mild)           Past Medical History:   Diagnosis Date    Abnormal Pap smear     Acute kidney failure (HCC)     Anemia     Anxiety     Asthma     [facility]     Cardiac arrest ([facility]     Cellulitis 2019    Depression     Dialysis patient ([facility]     Pt taken off approx 10/1/2019    Drug abuse ([facility]     gets opiates from [address] OPI 80s per day    H/O 29 wk indicated  delivery 2015    D/t fetal bradycardia at [facility] office, [facility] for STAT CS     Hepatitis C     History of [facility] 2020    Multiple, including opiates    HRP (high risk pregnancy) 2017    Kidney infection     Methadone maintenance 2014    6/18/15 Dose of 88mg confirmed with [facility]. - DISCHARGED from [facility] approximately 17  [facility] backline- [phone] Replacing Inactive Diagnoses  [facility] [phone]     Neurologic disorder     Opioid dependence with withdrawal ([facility] 2018    Polysubstance abuse ([facility]     Drug abuse includes Fentanyl, opoiates, benzos/Xanax, cocaine; pt is currently prescribed Suboxone    Polysubstance dependence ([facility] 2018    Opioid, cocaine, cannabis    Postpartum depression     RLTCS 17 M APG 8/9 Wt 7#12 2017    Seizures ([facility]     only when detoxing    Suicidal ideation     Ulceration     to right foot     Past Surgical History:   Procedure Laterality Date    ABDOMEN SURGERY       SECTION N/A 2017     SECTION performed by Twin Coyle MD at [facility]    [facility]      [place]  DELIVERY ONLY  10-21-14    , Low Cervical     Family History   Family history unknown: Yes     Social History     Tobacco Use    Smoking status: Never Smoker    Smokeless tobacco: Never Used   Substance Use Topics    Alcohol use: No     Alcohol/week: 0.0 standard drinks     Comment: prior abuse    Drug use: Yes     Types: Cocaine     Comment: Drug abuse includes Fentanyl, opoiates, benzos/Xanax, cocaine; pt is currently prescribed Suboxone             The patient's past medical, coronavirus pandemic and wearing masks    Eyes:      General: Lids are normal. No scleral icterus. Right eye: No discharge. Left eye: No discharge. Conjunctiva/sclera: Conjunctivae normal.   Neck:      Musculoskeletal: Normal range of motion and neck supple. Thyroid: No thyromegaly. Cardiovascular:      Rate and Rhythm: Normal rate and regular rhythm. Heart sounds: Normal heart sounds. No murmur. Pulmonary:      Effort: Pulmonary effort is normal. No respiratory distress. Breath sounds: Normal breath sounds. No wheezing or rales. Chest:      Chest wall: No tenderness. Abdominal:      General: Bowel sounds are normal. There is no distension. Palpations: Abdomen is soft. There is no hepatomegaly or splenomegaly. Tenderness: There is no abdominal tenderness. Comments: Obese abdomen. Musculoskeletal: Normal range of motion. General: No tenderness. Right lower leg: No edema. Left lower leg: No edema. Skin:     General: Skin is warm and dry. Capillary Refill: Capillary refill takes less than 2 seconds. Findings: No rash. Neurological:      Mental Status: She is alert and oriented to person, place, and time. Cranial Nerves: No cranial nerve deficit. Motor: No abnormal muscle tone. Psychiatric:         Mood and Affect: Mood is anxious. Affect is labile. Speech: Speech is rapid and pressured. Behavior: Behavior normal.         Thought Content: Thought content normal.         Judgment: Judgment normal.         Most recent labs reviewed with patient, and all questions answered.   Thrombocytopenia  Anemia  Hypokalemia    Hyperlipidemia  High triglycerides  Otherwise labs within normal limits     Lab Results   Component Value Date    WBC 5.6 07/19/2019    HGB 10.6 (L) 07/19/2019    HCT 30.1 (L) 07/19/2019    MCV 89.6 07/19/2019     (L) 07/19/2019       Lab Results   Component Value Date     episode depressed ([facility]  Stable  Continue current treatment and follow up with psychiatrist and psychologist as scheduled. 6. Hearing loss of right ear, unspecified hearing loss type  Failing to change as expected. - MYRIAM - Florencia Mckeon MD, Otolaryngology, [place]    7. History of cardiac arrest  Needs cardiac work-up  - MYRIAM - Andie Cannon MD, Cardiology, [place]    8. History of substance abuse ([facility]  Stable  Currently on Suboxone  Praise given    9. Drug-induced constipation  Failing to change as expected. -start docusate sodium (COLACE) 100 MG capsule; Take 1 capsule by mouth 2 times daily as needed for Constipation  Dispense: 60 capsule; Refill: 3    10. Chronic hepatitis C without hepatic coma ([facility]  Unsure if worsening or improving we will do blood work, will need RNA hep C, will likely need referral to GI  Lab Results   Component Value Date    HEPAIGM NONREACTIVE 12/12/2018    HEPBIGM NONREACTIVE 12/12/2018    HEPCAB REACTIVE (A) 12/12/2018       - CBC; Future  - Comprehensive Metabolic Panel; Future  - Vitamin D 25 Hydroxy; Future    11. Thrombocytopenia ([facility]  Unsure if worsening or improving  Recheck blood work  - CBC; Future    12. Screening for cardiovascular condition  - Lipid Panel; Future    13. Encounter for monitoring Suboxone maintenance therapy  Stable  On Suboxone  Start stool softener    14.  History of seizures  Patient self discontinued Keppra  She says the last seizure was about 1 year ago  She needs neurological evaluation  - Keven Treadwell MD, Neurology, [address] [place] [address] This Encounter   Procedures    CBC     Standing Status:   Future     Standing Expiration Date:   12/17/2021    Comprehensive Metabolic Panel     Standing Status:   Future     Standing Expiration Date:   12/17/2021    TSH without Reflex     Standing Status:   Future     Standing Expiration Date:   12/17/2021    Lipid Panel     Standing Status:   Future     Standing Expiration Date:   12/17/2021     Order Specific Question:   Is Patient Fasting?/# of Hours     Answer:   8-10 Hours, water ok to drink    Vitamin D 25 Hydroxy     Standing Status:   Future     Standing Expiration Date:   12/17/2021    Vitamin B12 & Folate     Standing Status:   Future     Standing Expiration Date:   12/17/2021   Timmy Ellison MD, Neurology, [place]     Referral Priority:   Routine     Referral Type:   Eval and Treat     Referral Reason:   Specialty Services Required     Referred to Provider:   Hernan Florez MD     Requested Specialty:   Neurology     Number of Visits Requested:   Rosalie Ragland MD, Otolaryngology, [place]     Referral Priority:   Routine     Referral Type:   Eval and Treat     Referral Reason:   Specialty Services Required     Referred to Provider:   Blanca Shaver MD     Requested Specialty:   Otolaryngology     Number of Visits Requested:   1    MYRIAM - Valdo Bejarano MD, Cardiology, [facility]     Referral Priority:   Routine     Referral Type:   Eval and Treat     Referral Reason:   Specialty Services Required     Referred to Provider:   Sowmya Wynne MD     Requested Specialty:   Cardiology     Number of Visits Requested:   1         Medications Discontinued During This Encounter   Medication Reason    QUEtiapine (SEROQUEL) 100 MG tablet Therapy completed    QUEtiapine (SEROQUEL) 300 MG tablet Therapy completed    levETIRAcetam (KEPPRA) 500 MG tablet Patient Choice         Majesta received counseling on the following healthy behaviors: nutrition, exercise, medication adherence and weight loss  Reviewed prior labs and health maintenance  Continue current medications, diet and exercise. Discussed use, benefit, and side effects of prescribed medications. Barriers to medication compliance addressed. Patient given educational materials - see patient instructions  Was a self-tracking handout given in paper form or via [employer]?  No    Requested Prescriptions Signed Prescriptions Disp Refills    albuterol sulfate HFA (PROVENTIL HFA) 108 (90 Base) MCG/ACT inhaler 1 Inhaler 1     Sig: Inhale 2 puffs into the lungs every 6 hours as needed for Wheezing or Shortness of Breath    Mometasone Furo-Formoterol Fum 50-5 MCG/ACT AERO 13 g 3     Sig: Inhale 2 puffs into the lungs 2 times daily    docusate sodium (COLACE) 100 MG capsule 60 capsule 3     Sig: Take 1 capsule by mouth 2 times daily as needed for Constipation       All patient questions answered. Patient voiced understanding. Quality Measures    Body mass index is 30.48 kg/m². Elevated. Weight control planned discussed conventional weight loss and Healthy diet and regular exercise. BP: 118/74 Blood pressure is normal. Treatment plan consists of No treatment change needed. The patient's past medical, surgical, social, and family history as well as her   current medications and allergies were reviewed as documented in today's encounter. Medications, labs, diagnostic studies, consultations and follow-up as documented in this encounter. Return in about 3 months (around 3/17/2021) for PHQ-9 in EPIC, ASTHMA, LABS F/U, w/ref to cardio, w/ref to neuro. Patient was seen with total face to face time of 45 minutes. More than 50% of this visit was counseling and education. Future Appointments   Date Time Provider Missael Raza   3/9/2021  3:00 PM SCHEDULE, [facility] NURSE OB Elyn Seip   5/5/2021  2:45 PM Aurora Morales MD [facility]   5/17/2021  3:00 PM Amber Jeffers MD [address] [place] [address]       This note was completed by using the assistance of a speech-recognition program. However, inadvertent computerized transcription errors may be present. Although every effort was made to ensure accuracy, no guarantees can be provided that every mistake has been identified and corrected by editing.   Electronically signed by Aurora Morales MD on 12/18/2020 at 6:57 PM

## 2020-12-17 NOTE — PROGRESS NOTES
Visit Information    Have you changed or started any medications since your last visit including any over-the-counter medicines, vitamins, or herbal medicines? no   Have you stopped taking any of your medications? Is so, why? -  no  Are you having any side effects from any of your medications? - no    Have you seen any other physician or provider since your last visit? yes - OBGYN   Have you had any other diagnostic tests since your last visit?  no   Have you been seen in the emergency room and/or had an admission in a hospital since we last saw you?  yes - 07/16/2020   Have you had your routine dental cleaning in the past 6 months?  yes -      Do you have an active MyChart account? If no, what is the barrier?   Yes    Patient Care Team:  Nancy eHrnandez MD as Consulting Physician (Obstetrics & Gynecology)    Medical History Review  Past Medical, Family, and Social History reviewed and does contribute to the patient presenting condition    Health Maintenance   Topic Date Due    Varicella vaccine (1 of 2 - 2-dose childhood series) 05/28/1991    Pneumococcal 0-64 years Vaccine (1 of 1 - PPSV23) 05/28/1996    Hepatitis B vaccine (1 of 3 - Risk 3-dose series) 05/28/2009    Flu vaccine (1) 09/01/2020    Cervical cancer screen  10/23/2022    DTaP/Tdap/Td vaccine (4 - Td) 03/23/2027    HIV screen  Completed    Hepatitis A vaccine  Aged Out    Hib vaccine  Aged Out    Meningococcal (ACWY) vaccine  Aged Out

## 2020-12-18 PROBLEM — F19.20 POLYSUBSTANCE DEPENDENCE (HCC): Chronic | Status: RESOLVED | Noted: 2018-12-06 | Resolved: 2020-12-18

## 2020-12-18 PROBLEM — F31.9 BIPOLAR 1 DISORDER (HCC): Status: RESOLVED | Noted: 2020-07-16 | Resolved: 2020-12-18

## 2020-12-18 PROBLEM — Z51.81 ENCOUNTER FOR MONITORING SUBOXONE MAINTENANCE THERAPY: Status: ACTIVE | Noted: 2020-12-18

## 2020-12-18 PROBLEM — L03.90 CELLULITIS: Status: RESOLVED | Noted: 2019-07-18 | Resolved: 2020-12-18

## 2020-12-18 PROBLEM — H91.91 HEARING LOSS OF RIGHT EAR: Status: ACTIVE | Noted: 2020-12-18

## 2020-12-18 PROBLEM — R20.2 NUMBNESS AND TINGLING: Status: ACTIVE | Noted: 2020-12-18

## 2020-12-18 PROBLEM — F33.9 MAJOR DEPRESSIVE DISORDER, RECURRENT (HCC): Chronic | Status: RESOLVED | Noted: 2020-02-15 | Resolved: 2020-12-18

## 2020-12-18 PROBLEM — K59.03 DRUG-INDUCED CONSTIPATION: Status: ACTIVE | Noted: 2020-12-18

## 2020-12-18 PROBLEM — R53.82 CHRONIC FATIGUE: Status: ACTIVE | Noted: 2020-12-18

## 2020-12-18 PROBLEM — Z98.891 S/P CESAREAN SECTION: Status: RESOLVED | Noted: 2017-06-14 | Resolved: 2020-12-18

## 2020-12-18 PROBLEM — F31.9 BIPOLAR DISORDER (HCC): Status: RESOLVED | Noted: 2019-07-20 | Resolved: 2020-12-18

## 2020-12-18 PROBLEM — R29.898 LUE WEAKNESS: Status: ACTIVE | Noted: 2020-12-18

## 2020-12-18 PROBLEM — R20.0 NUMBNESS AND TINGLING: Status: ACTIVE | Noted: 2020-12-18

## 2020-12-18 PROBLEM — F11.23 OPIOID DEPENDENCE WITH WITHDRAWAL (HCC): Chronic | Status: RESOLVED | Noted: 2018-12-06 | Resolved: 2020-12-18

## 2020-12-18 PROBLEM — Z79.899 ENCOUNTER FOR MONITORING SUBOXONE MAINTENANCE THERAPY: Status: ACTIVE | Noted: 2020-12-18

## 2020-12-18 PROBLEM — O09.90 HRP (HIGH RISK PREGNANCY): Status: RESOLVED | Noted: 2017-04-13 | Resolved: 2020-12-18

## 2020-12-18 PROBLEM — F19.11 HISTORY OF SUBSTANCE ABUSE (HCC): Status: ACTIVE | Noted: 2020-12-18

## 2020-12-18 PROBLEM — Z87.898 HISTORY OF SEIZURES: Status: ACTIVE | Noted: 2020-12-18

## 2020-12-18 PROBLEM — I46.9 CARDIAC ARREST (HCC): Status: ACTIVE | Noted: 2019-08-21

## 2020-12-18 PROBLEM — I46.9 CARDIAC ARREST (HCC): Status: RESOLVED | Noted: 2019-08-21 | Resolved: 2020-12-18

## 2020-12-18 PROBLEM — Z79.891 ENCOUNTER FOR MONITORING SUBOXONE MAINTENANCE THERAPY: Status: ACTIVE | Noted: 2020-12-18

## 2020-12-18 PROBLEM — Z86.74 HISTORY OF CARDIAC ARREST: Status: ACTIVE | Noted: 2020-12-18

## 2020-12-18 ASSESSMENT — ENCOUNTER SYMPTOMS
BLOOD IN STOOL: 0
CONSTIPATION: 1

## 2020-12-23 PROBLEM — J30.9 ALLERGIC RHINITIS: Status: ACTIVE | Noted: 2020-12-23

## 2020-12-23 RX ORDER — FLUTICASONE PROPIONATE 50 MCG
2 SPRAY, SUSPENSION (ML) NASAL DAILY
Qty: 1 BOTTLE | Refills: 3 | Status: SHIPPED | OUTPATIENT
Start: 2020-12-23 | End: 2022-07-13

## 2021-04-05 ENCOUNTER — NURSE ONLY (OUTPATIENT)
Dept: OBGYN CLINIC | Age: 31
End: 2021-04-05
Payer: MEDICARE

## 2021-04-05 VITALS
DIASTOLIC BLOOD PRESSURE: 75 MMHG | WEIGHT: 188.8 LBS | HEART RATE: 79 BPM | BODY MASS INDEX: 32.23 KG/M2 | HEIGHT: 64 IN | SYSTOLIC BLOOD PRESSURE: 125 MMHG | TEMPERATURE: 97.7 F

## 2021-04-05 DIAGNOSIS — N94.6 DYSMENORRHEA: ICD-10-CM

## 2021-04-05 DIAGNOSIS — Z32.02 NEGATIVE PREGNANCY TEST: Primary | ICD-10-CM

## 2021-04-05 LAB
CONTROL: NORMAL
PREGNANCY TEST URINE, POC: NEGATIVE

## 2021-04-05 PROCEDURE — 96372 THER/PROPH/DIAG INJ SC/IM: CPT | Performed by: SPECIALIST

## 2021-04-05 PROCEDURE — 81025 URINE PREGNANCY TEST: CPT | Performed by: SPECIALIST

## 2021-04-05 RX ORDER — MEDROXYPROGESTERONE ACETATE 150 MG/ML
150 INJECTION, SUSPENSION INTRAMUSCULAR ONCE
Status: COMPLETED | OUTPATIENT
Start: 2021-04-05 | End: 2021-04-05

## 2021-04-05 RX ADMIN — MEDROXYPROGESTERONE ACETATE 150 MG: 150 INJECTION, SUSPENSION INTRAMUSCULAR at 16:56

## 2021-04-05 NOTE — PROGRESS NOTES
Patient was given Depo Provera in the Right Gluteus Corey. NDC# [id number]  LOT# [id number]  Exp date- 08/01/2022  Patient's last injection was 12/08/2020. Patient's last annual exam was on 10/23/2019. Patient tolerated well without difficulty.

## 2021-04-16 NOTE — VIRTUAL HEALTH
Department of Psychiatry  Nurse Practitioner Progress Note  Chief Complaint: Bipolar 1 disorder [facility])     SUBJECTIVE:  Patient is a 27-year-old female who was well until emitted from the [facility] on 10/16/2020 for withdrawals from Xanax on the street and feeling unsafe. Today the patient is seen via virtual health with a nurse present in the treatment room. She endorses that she is physically feeling better today. She reports that she is doing better with the benzo withdrawals. She endorses having auditory hallucinations stating \"I always have. Sometimes I have to go up to my room to get some peace. \"  She reports that she is interested in returning home as she has 5 children and would like to get back to them. She also endorses having frequent nightmares and flashbacks to her past trauma. We discussed prazosin but due to her low blood pressure, clinician to assess vitals and determine whether appropriate. She continues to endorse some anxiety when out on the unit due to the cohort currently present. She endorses that she is previously worked with Dr. Jeovany Gilliland. She denies suicidal or homicidal ideations at this time. Charting medications have been reviewed. Empathetic listening utilized, psychotherapy provided, and patient's concerns listened to and addressed. At this time there is no safe alternative other than hospitalization as patient cannot contract for safety outside of the hospital.    OBJECTIVE    Physical  BP (!) 89/49   Pulse 66   Temp 97.1 °F (36.2 °C) (Oral)   Resp 16   Ht 5' 4\" (1.626 m)   Wt 158 lb (71.7 kg)   LMP  (LMP Unknown)   SpO2 98%   BMI 27.12 kg/m²      Mental Status Evaluation:  Orientation: alertness: alert   Mood:.  Anxious       Affect:  constricted and mood-congruent      Appearance:  age appropriate and disheveled   Activity:  Psychomotor Retardation   Gait/Posture: Normal   Speech:  Spontaneous, normal volume, normal rate   Thought Process:  within normal limits Per MD- pt is to hold Ozempic until he is seen by nephrologist 6/17. I called pt & informed him- he verbalized understanding.   Thought Content:  Denies suicidal or homicidal ideation, reports auditory hallucinations. Sensorium:  person, place and situation   Cognition:  grossly intact   Memory: intact   Insight:  fair   Judgment: fair   Suicidal Ideations: denies suicidal ideation   Homicidal Ideations: Negative for homicidal ideation      Medication Side Effects: absent       Attention Span attention span appeared shorter than expected for age       Labs  Recent Results (from the past 67 hour(s))   COVID-19    Collection Time: 07/16/20  8:23 PM    Specimen: Other   Result Value Ref Range    SARS-CoV-2          SARS-CoV-2, Rapid Not Detected Not Detected    Source . NASOPHARYNGEAL SWAB     SARS-CoV-2, PCR             Medications  Current Facility-Administered Medications   Medication Dose Route Frequency Provider Last Rate Last Dose    traZODone (DESYREL) tablet 50 mg  50 mg Oral Nightly PRN Emily CORADO MD   50 mg at 07/18/20 2104    ondansetron (ZOFRAN) tablet 4 mg  4 mg Oral Q8H PRN TOMY Claire - CNP        gabapentin (NEURONTIN) capsule 300 mg  300 mg Oral TID Emily CORADO MD   300 mg at 07/19/20 0825    hydrOXYzine (ATARAX) tablet 25 mg  25 mg Oral TID PRN Meron Gaspar MD        QUEtiapine (SEROQUEL) tablet 300 mg  300 mg Oral Nightly Jani CORADO MD   300 mg at 07/18/20 2105    QUEtiapine (SEROQUEL) tablet 100 mg  100 mg Oral Daily Jani CORADO MD   100 mg at 07/19/20 0825    vilazodone HCl (VIIBRYD) TABS 10 mg  10 mg Oral Daily Emily CORADO MD   10 mg at 07/19/20 0824    levETIRAcetam (KEPPRA) tablet 250 mg  250 mg Oral BID Jani CORADO MD   250 mg at 07/19/20 0841    buprenorphine-naloxone (SUBOXONE) 8-2 MG SL film 1 Film  1 Film Sublingual BID Emily CORADO MD   1 Film at 07/19/20 0824         gabapentin  300 mg Oral TID    QUEtiapine  300 mg Oral Nightly    QUEtiapine  100 mg Oral Daily    vilazodone HCl  10 mg Oral Daily    levETIRAcetam 250 mg Oral BID    buprenorphine-naloxone  1 Film Sublingual BID       ASSESSMENT  Bipolar 1 disorder (HCC)     Patient's Response to Treatment: slowly positive    PLAN    · Continue inpatient psychiatric treatment  · Supportive therapy with medication management. Reviewed risks and benefits as well as potential side effects with patient. · Therapeutic activities and groups  · Follow up at [facility] after symptoms stabilized  · 07/19/2020 - consider Prazosin 1mg qHS if patient's blood pressure readings improve; at this time, clinician cautious due to chronic low readings. Estimated Length of Stay: 2-5 days      Electronically signed by TOMY Ragland NP on 7/19/2020 at 10:44 AM.    Dragon voice recognition software used in portions of this document. Patient Location:  [address] [place] [address]    Provider Location (City/State):   [place], [place]    This virtual visit was conducted via interactive/real-time audio/video.

## 2021-04-28 ENCOUNTER — HOSPITAL ENCOUNTER (OUTPATIENT)
Age: 31
Setting detail: SPECIMEN
Discharge: HOME | End: 2021-04-28
Payer: MEDICARE

## 2021-04-28 LAB
ALBUMIN SERPL-MCNC: 4.5 G/DL (ref 3.5–5.2)
ALBUMIN/GLOBULIN RATIO: 1.7 (ref 1–2.5)
ALP BLD-CCNC: 49 U/L (ref 35–104)
ALT SERPL-CCNC: 12 U/L (ref 5–33)
ANION GAP SERPL CALCULATED.3IONS-SCNC: 15 MMOL/L (ref 9–17)
AST SERPL-CCNC: 15 U/L
BILIRUB SERPL-MCNC: 0.26 MG/DL (ref 0.3–1.2)
BUN BLDV-MCNC: 18 MG/DL (ref 6–20)
BUN/CREAT BLD: ABNORMAL (ref 9–20)
CALCIUM SERPL-MCNC: 9.6 MG/DL (ref 8.6–10.4)
CHLORIDE BLD-SCNC: 104 MMOL/L (ref 98–107)
CHOLESTEROL/HDL RATIO: 3.2
CHOLESTEROL: 175 MG/DL
CO2: 22 MMOL/L (ref 20–31)
CREAT SERPL-MCNC: 0.79 MG/DL (ref 0.5–0.9)
GFR AFRICAN AMERICAN: >60 ML/MIN
GFR NON-AFRICAN AMERICAN: >60 ML/MIN
GFR SERPL CREATININE-BSD FRML MDRD: ABNORMAL ML/MIN/{1.73_M2}
GFR SERPL CREATININE-BSD FRML MDRD: ABNORMAL ML/MIN/{1.73_M2}
GLUCOSE BLD-MCNC: 75 MG/DL (ref 70–99)
HCT VFR BLD CALC: 38.6 % (ref 36.3–47.1)
HDLC SERPL-MCNC: 54 MG/DL
HEMOGLOBIN: 13.2 G/DL (ref 11.9–15.1)
LDL CHOLESTEROL: 101 MG/DL (ref 0–130)
MCH RBC QN AUTO: 30.9 PG (ref 25.2–33.5)
MCHC RBC AUTO-ENTMCNC: 34.2 G/DL (ref 28.4–34.8)
MCV RBC AUTO: 90.4 FL (ref 82.6–102.9)
NRBC AUTOMATED: 0 PER 100 WBC
PDW BLD-RTO: 13.4 % (ref 11.8–14.4)
PLATELET # BLD: 176 K/UL (ref 138–453)
PMV BLD AUTO: 11 FL (ref 8.1–13.5)
POTASSIUM SERPL-SCNC: 4.2 MMOL/L (ref 3.7–5.3)
RBC # BLD: 4.27 M/UL (ref 3.95–5.11)
SODIUM BLD-SCNC: 141 MMOL/L (ref 135–144)
TOTAL PROTEIN: 7.2 G/DL (ref 6.4–8.3)
TRIGL SERPL-MCNC: 101 MG/DL
VLDLC SERPL CALC-MCNC: NORMAL MG/DL (ref 1–30)
WBC # BLD: 5.6 K/UL (ref 3.5–11.3)

## 2021-04-29 LAB — TSH SERPL DL<=0.05 MIU/L-ACNC: 0.77 MIU/L (ref 0.3–5)

## 2021-06-21 ENCOUNTER — HOSPITAL ENCOUNTER (EMERGENCY)
Age: 31
Discharge: HOME | End: 2021-06-21
Attending: EMERGENCY MEDICINE
Payer: MEDICARE

## 2021-06-21 VITALS
DIASTOLIC BLOOD PRESSURE: 76 MMHG | HEIGHT: 64 IN | BODY MASS INDEX: 33.29 KG/M2 | TEMPERATURE: 99.2 F | OXYGEN SATURATION: 99 % | RESPIRATION RATE: 16 BRPM | HEART RATE: 78 BPM | SYSTOLIC BLOOD PRESSURE: 126 MMHG | WEIGHT: 195 LBS

## 2021-06-21 DIAGNOSIS — R21 RASH AND OTHER NONSPECIFIC SKIN ERUPTION: ICD-10-CM

## 2021-06-21 DIAGNOSIS — L30.9 DERMATITIS: Primary | ICD-10-CM

## 2021-06-21 PROCEDURE — 99283 EMERGENCY DEPT VISIT LOW MDM: CPT

## 2021-06-21 RX ORDER — DIAPER,BRIEF,INFANT-TODD,DISP
EACH MISCELLANEOUS
Qty: 1 TUBE | Refills: 1 | Status: SHIPPED | OUTPATIENT
Start: 2021-06-21 | End: 2021-06-28

## 2021-06-21 RX ORDER — DOXYCYCLINE HYCLATE 100 MG
100 TABLET ORAL 2 TIMES DAILY
Qty: 20 TABLET | Refills: 0 | Status: SHIPPED | OUTPATIENT
Start: 2021-06-21 | End: 2021-07-01

## 2021-06-21 ASSESSMENT — PAIN SCALES - GENERAL: PAINLEVEL_OUTOF10: 6

## 2021-06-22 ASSESSMENT — ENCOUNTER SYMPTOMS
ABDOMINAL DISTENTION: 0
CONSTIPATION: 0
COLOR CHANGE: 0
CHEST TIGHTNESS: 0
VOMITING: 0
SINUS PAIN: 0
SHORTNESS OF BREATH: 0
EYES NEGATIVE: 1
DIARRHEA: 0
APNEA: 0

## 2021-06-22 NOTE — ED PROVIDER NOTES
EMERGENCY DEPARTMENT ENCOUNTER    Pt Name: Sweetie Ames  MRN: [id number]  [place] 1990  Date of evaluation: 21  CHIEF COMPLAINT       Chief Complaint   Patient presents with    Rash     upper body- red rash     HISTORY OF PRESENT ILLNESS   35-year-old female presenting to the emergency room for rash to the face chest arms and back. Patient reports she suffers from sensitive skin and has had issues with acne in the past.  She reports she was recently incarcerated for a number of weeks and has had issues with breakout to the skin since then. She states that the lesions do not feel and look like her typical acne or skin rash. She states she has itching to the skin. REVIEW OF SYSTEMS     Review of Systems   Constitutional: Negative for activity change, chills and diaphoresis. HENT: Negative for congestion, sinus pain and tinnitus. Eyes: Negative. Respiratory: Negative for apnea, chest tightness and shortness of breath. Gastrointestinal: Negative for abdominal distention, constipation, diarrhea and vomiting. Genitourinary: Negative for difficulty urinating and frequency. Musculoskeletal: Negative for arthralgias and myalgias. Skin: Positive for rash. Negative for color change. Neurological: Negative for dizziness. Hematological: Negative. Psychiatric/Behavioral: Negative.         PASTMEDICAL HISTORY     Past Medical History:   Diagnosis Date    Abnormal Pap smear     Acute kidney failure (HCC)     Anemia     Anxiety     Asthma     Bipolar 1 disorder [facility]     Cardiac arrest ([facility]     Cellulitis 2019    Depression     Dialysis patient ([facility]     Pt taken off approx 10/1/2019    Drug abuse ([facility]     gets opiates from [address] OPI 80s per day    H/O 29 wk indicated  delivery 2015    D/t fetal bradycardia at [facility] office, [facility] to [facility] for STAT CS     Hepatitis C     History of substance abuse ([facility] 2020    Multiple, including opiates    HRP (high risk pregnancy) 2017    Kidney infection     Methadone maintenance 2014    6/18/15 Dose of 88mg confirmed with [facility]I. - DISCHARGED from [facility] approximately 17  [facility] backline- [phone] Replacing Inactive Diagnoses  zepf [facility] [phone]     Neurologic disorder     Opioid dependence with withdrawal ([email or web address] 2018    Polysubstance abuse ([email or web address]     Drug abuse includes Fentanyl, opoiates, benzos/Xanax, cocaine; pt is currently prescribed Suboxone    [facility]    Opioid, cocaine, cannabis    Postpartum depression     RLTCS 17 M APG 8/ Wt 7#12 2017    Seizures ([email or web address]     only when detoxing    Suicidal ideation     Ulceration     to right foot     Past Problem List  Patient Active Problem List   Diagnosis Code    Chronic hepatitis C without hepatic coma (HCC) B18.2    Moderate persistent asthma without complication L94.67    Thrombocytopenia D69.6    H/O CS x 2 Z98.891    Lost custody of children Z65.3    Bipolar I disorder, most recent episode depressed ([facility] F31.30    Herpes simplex labialis B00.1    Chronic fatigue R53.82    LUE weakness R29.898    Numbness and tingling R20.0, R20.2    History of substance abuse ([facility] F19.11    Hearing loss of right ear H91.91    History of cardiac arrest Z86.74    Drug-induced constipation K59.03    Encounter for monitoring Suboxone maintenance therapy Z51.81, Z79.899    History of seizures Z87.898    Allergic rhinitis J30.9     SURGICAL HISTORY       Past Surgical History:   Procedure Laterality Date    ABDOMEN SURGERY       SECTION N/A 2017     SECTION performed by [facility] Jensen MD at [facility] L[facility]D OR    GÓMEZ      [place]  DELIVERY ONLY  10-21-14    , Low Cervical     CURRENT MEDICATIONS       Discharge Medication List as of 2021  9:09 PM      CONTINUE these medications which have NOT CHANGED    Details   fluticasone (FLONASE) 50 MCG/ACT nasal spray 2 sprays by Nasal route daily, Disp-1 Bottle, R-3Normal      guanFACINE (TENEX) 1 MG tablet TAKE 1 TABLET BY MOUTH EVERY 12 HOURSHistorical Med      albuterol sulfate HFA (PROVENTIL HFA) 108 (90 Base) MCG/ACT inhaler Inhale 2 puffs into the lungs every 6 hours as needed for Wheezing or Shortness of Breath, Disp-1 Inhaler, R-1Normal      Mometasone Furo-Formoterol Fum 50-5 MCG/ACT AERO Inhale 2 puffs into the lungs 2 times daily, Disp-13 g, R-3Normal      docusate sodium (COLACE) 100 MG capsule Take 1 capsule by mouth 2 times daily as needed for Constipation, Disp-60 capsule, R-3Normal      SUBOXONE 8-2 MG FILM SL film PLACE 1 STRIP UNDER THE TONGUE TWICE DAILY, DAWHistorical Med      hydrOXYzine (ATARAX) 25 MG tablet Take 1 tablet by mouth 3 times daily as needed for Anxiety, Disp-90 tablet,R-0Normal      gabapentin (NEURONTIN) 300 MG capsule Take 1 capsule by mouth 3 times daily for 30 days. , Disp-90 capsule, R-0Normal      vilazodone HCl (VILAZODONE HCL) 10 MG TABS Take 1 tablet by mouth daily, Disp-30 tablet, R-0Normal      prazosin (MINIPRESS) 1 MG capsule Take 1 capsule by mouth 2 times daily, Disp-30 capsule,R-0Normal      traZODone (DESYREL) 50 MG tablet Take 1 tablet by mouth nightly as needed for Sleep, Disp-30 tablet,R-0Normal           ALLERGIES     is allergic to penicillins and buspirone. FAMILY HISTORY     She indicated that her mother is alive. She indicated that her father is alive.      SOCIAL HISTORY       Social History     Tobacco Use    Smoking status: Never Smoker    Smokeless tobacco: Never Used   Vaping Use    Vaping Use: Never assessed   Substance Use Topics    Alcohol use: No     Alcohol/week: 0.0 standard drinks     Comment: prior abuse    Drug use: Not Currently     Types: Cocaine     Comment: Drug abuse includes Fentanyl, opoiates, benzos/Xanax, cocaine; pt is currently prescribed Suboxone     PHYSICAL EXAM     INITIAL VITALS: /76   Pulse 78   Temp 99.2 °F (37.3 °C) Vitals:    Vitals:    06/21/21 2022 06/21/21 2023   BP:  126/76   Pulse: 78    Resp: 16    Temp: 99.2 °F (37.3 °C)    TempSrc: Oral    SpO2: 99%    Weight: 195 lb (88.5 kg)    Height: 5' 4\" (1.626 m)        The patient was given the following medications while in the emergency department:  Orders Placed This Encounter   Medications    doxycycline hyclate (VIBRA-TABS) 100 MG tablet     Sig: Take 1 tablet by mouth 2 times daily for 10 days     Dispense:  20 tablet     Refill:  0    hydrocortisone (ALA-ANUP) 1 % cream     Sig: Apply topically 2 times daily. Dispense:  1 Tube     Refill:  1     CONSULTS:  None    FINAL IMPRESSION      1. Dermatitis    2. Rash and other nonspecific skin eruption          DISPOSITION/PLAN   DISPOSITION Decision To Discharge 06/21/2021 08:54:12 PM      PATIENT REFERRED TO:  M[address]  [address] [address]  [phone]  Schedule an appointment as soon as possible for a visit in 1 week      TOMY Higgins - [address] [address]  [phone]    Schedule an appointment as soon as possible for a visit in 1 week      DISCHARGE MEDICATIONS:  Discharge Medication List as of 6/21/2021  9:09 PM      START taking these medications    Details   doxycycline hyclate (VIBRA-TABS) 100 MG tablet Take 1 tablet by mouth 2 times daily for 10 days, Disp-20 tablet, R-0Print      hydrocortisone (ALA-ANUP) 1 % cream Apply topically 2 times daily. , Disp-1 Tube, R-1, Print           Abhishek Saldaña MD  Attending Emergency Physician                  Rosan Bernheim, MD  06/22/21 8690

## 2021-06-22 NOTE — ED NOTES
Pt provided discharge instructions and educated on prescriptions. Pt instructed to follow up with dermatology. Pt verbalizes understanding and denies additional questions or concerns at this time.        Chirag Hernandez RN  06/21/21 0733

## 2021-06-30 ENCOUNTER — NURSE ONLY (OUTPATIENT)
Dept: OBGYN CLINIC | Age: 31
End: 2021-06-30
Payer: MEDICARE

## 2021-06-30 VITALS — SYSTOLIC BLOOD PRESSURE: 97 MMHG | DIASTOLIC BLOOD PRESSURE: 63 MMHG | HEART RATE: 72 BPM

## 2021-06-30 DIAGNOSIS — N94.6 DYSMENORRHEA: Primary | ICD-10-CM

## 2021-06-30 PROCEDURE — 96372 THER/PROPH/DIAG INJ SC/IM: CPT | Performed by: CLINICAL NURSE SPECIALIST

## 2021-06-30 RX ORDER — MEDROXYPROGESTERONE ACETATE 150 MG/ML
150 INJECTION, SUSPENSION INTRAMUSCULAR ONCE
Status: COMPLETED | OUTPATIENT
Start: 2021-06-30 | End: 2021-06-30

## 2021-06-30 RX ADMIN — MEDROXYPROGESTERONE ACETATE 150 MG: 150 INJECTION, SUSPENSION INTRAMUSCULAR at 16:51

## 2021-06-30 NOTE — PROGRESS NOTES
Patient was given Depo Provera in the Left Gluteus Corey. NDC# [id number]  LOT# [id number]  Exp date- 09.2022  Patient's last injection was 04.05.2021. Patient's last annual exam was on n/a. Patient tolerated well without difficulty.

## 2021-07-12 ENCOUNTER — TELEPHONE (OUTPATIENT)
Dept: NEUROLOGY | Age: 31
End: 2021-07-12

## 2021-07-21 ENCOUNTER — TELEPHONE (OUTPATIENT)
Dept: OBGYN CLINIC | Age: 31
End: 2021-07-21

## 2021-07-21 RX ORDER — METRONIDAZOLE 500 MG/1
500 TABLET ORAL 2 TIMES DAILY
Qty: 14 TABLET | Refills: 0 | Status: SHIPPED | OUTPATIENT
Start: 2021-07-21 | End: 2021-07-28

## 2021-07-21 RX ORDER — FLUCONAZOLE 100 MG/1
100 TABLET ORAL DAILY
Qty: 7 TABLET | Refills: 0 | Status: SHIPPED | OUTPATIENT
Start: 2021-07-21 | End: 2021-07-28

## 2021-07-27 NOTE — ED PROVIDER NOTES
[address]  ED  Emergency Department Encounter  EmergencyMedicine Resident     Pt Katina Reardon  MRN: [id number]  [place] 1990  Date of evaluation: 19  PCP:  No primary care provider on file. CHIEF COMPLAINT       Chief Complaint   Patient presents with    Drug Overdose     was given 6mg of narcan, CPR was done by bystander, snorts herion        HISTORY OF PRESENT ILLNESS  (Location/Symptom, Timing/Onset, Context/Setting, Quality, Duration, Modifying Factors, Severity.)      Altagracia Sherwood is a 34 y.o. female who presents with opiate overdose. Patient given 6 mg of Narcan prior to arrival and regained consciousness. Patient with no complaints of pain. Patient denying any drug use today. Patient with multiple visits for overdose. No external signs of trauma per EMS and on exam.  Patient denying any alcohol use. PAST MEDICAL / SURGICAL / SOCIAL / FAMILY HISTORY      has a past medical history of Abnormal Pap smear, Anemia, Anxiety, Asthma, Bipolar 1 disorder ([address] Depression, Drug abuse ([address] Hepatitis C, Kidney infection, Neurologic disorder, Postpartum depression, Seizures ([address] and Ulceration. has a past surgical history that includes LEEP (); pr  delivery only (10-21-14); Abdomen surgery; and  section (N/A, 2017).     Social History     Socioeconomic History    Marital status: Single     Spouse name: Not on file    Number of children: Not on file    Years of education: Not on file    Highest education level: Not on file   Occupational History    Not on file   Social Needs    Financial resource strain: Not on file    Food insecurity:     Worry: Not on file     Inability: Not on file    Transportation needs:     Medical: Not on file     Non-medical: Not on file   Tobacco Use    Smoking status: Never Smoker    Smokeless tobacco: Never Used   Substance and Sexual Activity    Alcohol use: No     Alcohol/week: 0.0 standard drinks Patient transported to XR from waiting room     Tiffany Cox RN  07/27/21 0678 infectious, trauma, seizure, AMS, electrolytes, encephalopathy, insulin, opiates, uremia, toxins, tumor, thyrotoxicosis, psychiatric, sepsis, stroke, N/V, seizure, headache, elderly age, alcohol / drugs / toxidromes, signs of trauma      DIAGNOSTIC RESULTS / [facility]     LABS:  No results found for this visit on 08/09/19. RADIOLOGY:  None    EKG  None    All EKG's are interpreted by the Emergency Department Physician who either signs or Co-signs this chart in the absence of a cardiologist.    EMERGENCY DEPARTMENT COURSE:  Pt A & O x 3 on arrival, VSS, no resp distress, speaking in full sentences, lungs clear. Received 6mg narcan per EMS. No signs of trauma. Pt w multiple ED visits for opiate overdose. Not cooperative with providing history, stating that she denies using drugs. No evidence of trauma on exam. Pt monitored for >1hr without any episodes of dyspnea, resp depressions, bradycardia, hypotension. Social work consulted. Patient reevaluated and appears more somnolent. Pupils dilated and approximately 8 mm bilaterally. Patient difficult to arouse. Repeat dose of 0.8 mg of Narcan given the patient with improvement of mental status. We will continue to monitor. Plan is to reevaluate and possibly discharge. Care transferred to Dr. Rebeca Diop. PROCEDURES:  None    CONSULTS:  IP CONSULT TO SOCIAL WORK    CRITICAL CARE:  None    FINAL IMPRESSION      1. Accidental drug overdose, initial encounter          DISPOSITION / PLAN     DISPOSITION        PATIENT REFERRED TO:  No follow-up provider specified.     DISCHARGE MEDICATIONS:  New Prescriptions    No medications on file       Debra Taylor MD  Emergency Medicine Resident    (Please note that portions of thisnote were completed with a voice recognition program.  Efforts were made to edit the dictations but occasionally words are mis-transcribed.)       Debra Taylor MD  08/09/19 6202

## 2021-07-28 ENCOUNTER — HOSPITAL ENCOUNTER (EMERGENCY)
Age: 31
Discharge: HOME | End: 2021-07-28
Attending: EMERGENCY MEDICINE
Payer: MEDICARE

## 2021-07-28 VITALS
TEMPERATURE: 100.2 F | DIASTOLIC BLOOD PRESSURE: 84 MMHG | OXYGEN SATURATION: 100 % | WEIGHT: 202 LBS | BODY MASS INDEX: 34.67 KG/M2 | RESPIRATION RATE: 16 BRPM | HEART RATE: 88 BPM | SYSTOLIC BLOOD PRESSURE: 124 MMHG

## 2021-07-28 DIAGNOSIS — K08.89 DENTALGIA: Primary | ICD-10-CM

## 2021-07-28 DIAGNOSIS — K04.7 DENTAL INFECTION: ICD-10-CM

## 2021-07-28 DIAGNOSIS — R21 RASH AND OTHER NONSPECIFIC SKIN ERUPTION: ICD-10-CM

## 2021-07-28 PROCEDURE — 6370000000 HC RX 637 (ALT 250 FOR IP): Performed by: PHYSICIAN ASSISTANT

## 2021-07-28 PROCEDURE — 99283 EMERGENCY DEPT VISIT LOW MDM: CPT

## 2021-07-28 RX ORDER — ACETAMINOPHEN 500 MG
1000 TABLET ORAL ONCE
Status: COMPLETED | OUTPATIENT
Start: 2021-07-28 | End: 2021-07-28

## 2021-07-28 RX ORDER — PREDNISONE 20 MG/1
20 TABLET ORAL 2 TIMES DAILY
Qty: 10 TABLET | Refills: 0 | Status: SHIPPED | OUTPATIENT
Start: 2021-07-28 | End: 2021-08-02

## 2021-07-28 RX ORDER — CLINDAMYCIN HYDROCHLORIDE 150 MG/1
300 CAPSULE ORAL 3 TIMES DAILY
Qty: 60 CAPSULE | Refills: 0 | Status: SHIPPED | OUTPATIENT
Start: 2021-07-28 | End: 2021-08-07

## 2021-07-28 RX ORDER — CLINDAMYCIN HYDROCHLORIDE 150 MG/1
300 CAPSULE ORAL ONCE
Status: COMPLETED | OUTPATIENT
Start: 2021-07-28 | End: 2021-07-28

## 2021-07-28 RX ADMIN — ACETAMINOPHEN 1000 MG: 500 TABLET ORAL at 21:56

## 2021-07-28 RX ADMIN — CLINDAMYCIN HYDROCHLORIDE 300 MG: 150 CAPSULE ORAL at 21:56

## 2021-07-28 ASSESSMENT — PAIN SCALES - GENERAL: PAINLEVEL_OUTOF10: 7

## 2021-07-28 ASSESSMENT — PAIN DESCRIPTION - PAIN TYPE: TYPE: ACUTE PAIN

## 2021-07-29 NOTE — ED PROVIDER NOTES
eMERGENCY dEPARTMENT eNCOUnter   Independent Attestation     Pt Name: Nick Malcolm  MRN: [id number]  [place] 1990  Date of evaluation: 7/28/21     Nick Malcolm is a 32 y.o. female with CC: Mouth Lesions and Fever    Chronic rash for months on hands, has outpt appt with derm soon. Poor overall dentition with facial swelling, no airway involvement. D/c on abx    Based on the medical record the care appears appropriate. I was personally available for consultation in the Emergency Department.     Xiang Quiroz DO  Attending Emergency Physician                    Torsten Bliss 5236,   07/28/21 6377

## 2021-07-29 NOTE — ED PROVIDER NOTES
[address] [place] [address] ED  [facility]      Pt Name: Cindi Thomson  MRN: [id number]  [place] 1990  Date ofevaluation: 7/28/2021  Provider: Izabella Torres Dr       Chief Complaint   Patient presents with    Mouth Lesions    Fever         HISTORY OF PRESENT ILLNESS  (Location/Symptom, Timing/Onset, Context/Setting, Quality, Duration, Modifying Factors, Severity.)   Cindi Thomson is a 32 y.o. female who presents to the emergency department with dental pain and swelling to the posterior lower right portion of mouth. Patient reports subjective fevers today. No nausea or vomiting. Patient also having with a rash for a couple months. Rash located to her chest, upper extremities, face and also to her fingers. Patient states that she was treated recently in June and symptoms were improved. She reports having upcoming dermatology appointment is waiting for verification as to call the back for an appointment. Nursing Notes were reviewed. ALLERGIES     Penicillins and Buspirone    CURRENT MEDICATIONS       Previous Medications    ALBUTEROL SULFATE HFA (PROVENTIL HFA) 108 (90 BASE) MCG/ACT INHALER    Inhale 2 puffs into the lungs every 6 hours as needed for Wheezing or Shortness of Breath    DOCUSATE SODIUM (COLACE) 100 MG CAPSULE    Take 1 capsule by mouth 2 times daily as needed for Constipation    FLUTICASONE (FLONASE) 50 MCG/ACT NASAL SPRAY    2 sprays by Nasal route daily    GABAPENTIN (NEURONTIN) 300 MG CAPSULE    Take 1 capsule by mouth 3 times daily for 30 days.     HYDROXYZINE (ATARAX) 25 MG TABLET    Take 1 tablet by mouth 3 times daily as needed for Anxiety    METRONIDAZOLE (FLAGYL) 500 MG TABLET    Take 1 tablet by mouth 2 times daily for 7 days    MOMETASONE FURO-FORMOTEROL FUM 50-5 MCG/ACT AERO    Inhale 2 puffs into the lungs 2 times daily    SUBOXONE 8-2 MG FILM SL FILM    PLACE 1 STRIP UNDER THE TONGUE TWICE DAILY    TRAZODONE (DESYREL) 50 MG TABLET Take 1 tablet by mouth nightly as needed for Sleep    VILAZODONE HCL (VILAZODONE HCL) 10 MG TABS    Take 1 tablet by mouth daily       PAST MEDICAL HISTORY         Diagnosis Date    Abnormal Pap smear     Acute kidney failure (HCC)     Anemia     Anxiety     Asthma     B[facility]     Cardiac arrest ([facility]     Cellulitis 2019    Depression     Dialysis patient ([facility]     Pt taken off approx 10/1/2019    Drug abuse ([facility]     gets opiates from [address] OPI 80s per day    H/O 29 wk indicated  delivery 2015    D/t fetal bradycardia at [facility] office, [facility] for STAT CS     Hepatitis C     History of substance abuse ([facility] 2020    Multiple, including opiates    HRP (high risk pregnancy) 2017    Kidney infection     Methadone maintenance 2014    6/18/15 Dose of 88mg confirmed with [facility]I. - DISCHARGED from [facility] approximately 17  [facility] backline- [phone] Replacing Inactive Diagnoses  zepf [facility] [phone]     Neurologic disorder     Opioid dependence with withdrawal ([facility] 2018    Polysubstance abuse ([facility]     Drug abuse includes Fentanyl, opoiates, benzos/Xanax, cocaine; pt is currently prescribed Suboxone    Polysubstance dependence ([facility] 2018    Opioid, cocaine, cannabis    Postpartum depression     RLTCS 17 M APG 8/9 Wt 7#12 2017    Seizures ([facility]     only when detoxing    Suicidal ideation     Ulceration     to right foot       SURGICAL HISTORY           Procedure Laterality Date    ABDOMEN SURGERY       SECTION N/A 2017     SECTION performed by Margret Oliveira MD at [facility] L[facility]D OR    [facility]      [place]  DELIVERY ONLY  10-21-14    , Low Cervical         FAMILY HISTORY           Family history unknown: Yes     Family Status   Relation Name Status    Mother  Alive    Father  Alive        SOCIAL HISTORY      reports that she has never smoked.  She has never used smokeless to display    All other labs were within normal range or not returned as of this dictation. EMERGENCY DEPARTMENT COURSE and DIFFERENTIAL DIAGNOSIS/MDM:   Vitals:    Vitals:    07/28/21 2004   BP: 124/84   Pulse: 88   Resp: 16   Temp: 100.2 °F (37.9 °C)   TempSrc: Oral   SpO2: 100%   Weight: 202 lb (91.6 kg)     Will dc HOME. COVERED with prednisone for rash. Given clindamycin. Non septic and non toxic appearing. CONSULTS:  None    PROCEDURES:  Procedures        FINAL IMPRESSION      1. Dentalgia    2. Dental infection    3. Rash and other nonspecific skin eruption          DISPOSITION/PLAN   DISPOSITION Decision To Discharge 07/28/2021 09:54:53 PM      PATIENTREFERRED TO:   No follow-up provider specified.     DISCHARGE MEDICATIONS:     New Prescriptions    CLINDAMYCIN (CLEOCIN) 150 MG CAPSULE    Take 2 capsules by mouth 3 times daily for 10 days    PREDNISONE (DELTASONE) 20 MG TABLET    Take 1 tablet by mouth 2 times daily for 5 days           (Please note that portions of this note were completed with a voice recognition program.  Efforts were made to edit thedictations but occasionally words are mis-transcribed.)    APRIL Kelly PA-C  07/28/21 9955

## 2021-07-29 NOTE — ED NOTES
Patient comes in from home and presents with blisters in her mouth for two days. Patient states that she has been seeing a dentist but started having pain and swelling to the right lower jaw and blisters in her mouth,.      Delaney Vaughan RN  07/28/21 6749

## 2021-08-03 ENCOUNTER — TELEPHONE (OUTPATIENT)
Dept: NEUROLOGY | Age: 31
End: 2021-08-03

## 2021-08-04 ENCOUNTER — HOSPITAL ENCOUNTER (INPATIENT)
Age: 31
LOS: 1 days | Discharge: HOME | DRG: 115 | End: 2021-08-05
Attending: EMERGENCY MEDICINE | Admitting: INTERNAL MEDICINE
Payer: MEDICARE

## 2021-08-04 ENCOUNTER — APPOINTMENT (OUTPATIENT)
Dept: CT IMAGING | Age: 31
DRG: 115 | End: 2021-08-04
Payer: MEDICARE

## 2021-08-04 DIAGNOSIS — K12.2 SUBLINGUAL ABSCESS: Primary | ICD-10-CM

## 2021-08-04 LAB
ABSOLUTE EOS #: 0.22 K/UL (ref 0–0.44)
ABSOLUTE IMMATURE GRANULOCYTE: 0.05 K/UL (ref 0–0.3)
ABSOLUTE LYMPH #: 3.58 K/UL (ref 1.1–3.7)
ABSOLUTE MONO #: 0.64 K/UL (ref 0.1–1.2)
ANION GAP SERPL CALCULATED.3IONS-SCNC: 11 MMOL/L (ref 9–17)
BASOPHILS # BLD: 1 % (ref 0–2)
BASOPHILS ABSOLUTE: 0.04 K/UL (ref 0–0.2)
BUN BLDV-MCNC: 17 MG/DL (ref 6–20)
BUN/CREAT BLD: 18 (ref 9–20)
CALCIUM SERPL-MCNC: 9.4 MG/DL (ref 8.6–10.4)
CHLORIDE BLD-SCNC: 98 MMOL/L (ref 98–107)
CO2: 28 MMOL/L (ref 20–31)
CREAT SERPL-MCNC: 0.93 MG/DL (ref 0.5–0.9)
DIFFERENTIAL TYPE: ABNORMAL
EOSINOPHILS RELATIVE PERCENT: 3 % (ref 1–4)
GFR AFRICAN AMERICAN: >60 ML/MIN
GFR NON-AFRICAN AMERICAN: >60 ML/MIN
GFR SERPL CREATININE-BSD FRML MDRD: ABNORMAL ML/MIN/{1.73_M2}
GFR SERPL CREATININE-BSD FRML MDRD: ABNORMAL ML/MIN/{1.73_M2}
GLUCOSE BLD-MCNC: 82 MG/DL (ref 70–99)
HCT VFR BLD CALC: 37.1 % (ref 36.3–47.1)
HEMOGLOBIN: 12.7 G/DL (ref 11.9–15.1)
IMMATURE GRANULOCYTES: 1 %
LACTIC ACID, SEPSIS WHOLE BLOOD: NORMAL MMOL/L (ref 0.5–1.9)
LACTIC ACID, SEPSIS: 0.8 MMOL/L (ref 0.5–1.9)
LYMPHOCYTES # BLD: 46 % (ref 24–43)
MCH RBC QN AUTO: 30.3 PG (ref 25.2–33.5)
MCHC RBC AUTO-ENTMCNC: 34.2 G/DL (ref 28.4–34.8)
MCV RBC AUTO: 88.5 FL (ref 82.6–102.9)
MONOCYTES # BLD: 8 % (ref 3–12)
NRBC AUTOMATED: 0 PER 100 WBC
PDW BLD-RTO: 12 % (ref 11.8–14.4)
PLATELET # BLD: 204 K/UL (ref 138–453)
PLATELET ESTIMATE: ABNORMAL
PMV BLD AUTO: 9.4 FL (ref 8.1–13.5)
POTASSIUM SERPL-SCNC: 3.8 MMOL/L (ref 3.7–5.3)
RBC # BLD: 4.19 M/UL (ref 3.95–5.11)
RBC # BLD: ABNORMAL 10*6/UL
SEG NEUTROPHILS: 41 % (ref 36–65)
SEGMENTED NEUTROPHILS ABSOLUTE COUNT: 3.09 K/UL (ref 1.5–8.1)
SODIUM BLD-SCNC: 137 MMOL/L (ref 135–144)
WBC # BLD: 7.6 K/UL (ref 3.5–11.3)
WBC # BLD: ABNORMAL 10*3/UL

## 2021-08-04 PROCEDURE — 85025 COMPLETE CBC W/AUTO DIFF WBC: CPT

## 2021-08-04 PROCEDURE — 2580000003 HC RX 258: Performed by: EMERGENCY MEDICINE

## 2021-08-04 PROCEDURE — 83605 ASSAY OF LACTIC ACID: CPT

## 2021-08-04 PROCEDURE — 99283 EMERGENCY DEPT VISIT LOW MDM: CPT

## 2021-08-04 PROCEDURE — 80048 BASIC METABOLIC PNL TOTAL CA: CPT

## 2021-08-04 PROCEDURE — 70491 CT SOFT TISSUE NECK W/DYE: CPT

## 2021-08-04 PROCEDURE — 6360000004 HC RX CONTRAST MEDICATION: Performed by: EMERGENCY MEDICINE

## 2021-08-04 RX ORDER — 0.9 % SODIUM CHLORIDE 0.9 %
80 INTRAVENOUS SOLUTION INTRAVENOUS ONCE
Status: COMPLETED | OUTPATIENT
Start: 2021-08-04 | End: 2021-08-04

## 2021-08-04 RX ORDER — SODIUM CHLORIDE 0.9 % (FLUSH) 0.9 %
10 SYRINGE (ML) INJECTION PRN
Status: DISCONTINUED | OUTPATIENT
Start: 2021-08-04 | End: 2021-08-05 | Stop reason: HOSPADM

## 2021-08-04 RX ADMIN — SODIUM CHLORIDE 80 ML: 9 INJECTION, SOLUTION INTRAVENOUS at 23:12

## 2021-08-04 RX ADMIN — SODIUM CHLORIDE, PRESERVATIVE FREE 10 ML: 5 INJECTION INTRAVENOUS at 23:12

## 2021-08-04 RX ADMIN — IOPAMIDOL 75 ML: 755 INJECTION, SOLUTION INTRAVENOUS at 23:12

## 2021-08-05 VITALS
DIASTOLIC BLOOD PRESSURE: 82 MMHG | OXYGEN SATURATION: 98 % | WEIGHT: 211.86 LBS | HEART RATE: 75 BPM | BODY MASS INDEX: 36.17 KG/M2 | SYSTOLIC BLOOD PRESSURE: 112 MMHG | TEMPERATURE: 98.8 F | HEIGHT: 64 IN | RESPIRATION RATE: 16 BRPM

## 2021-08-05 PROBLEM — K12.2 LUDWIG'S ANGINA: Status: ACTIVE | Noted: 2021-08-05

## 2021-08-05 LAB
LACTIC ACID, SEPSIS WHOLE BLOOD: ABNORMAL MMOL/L (ref 0.5–1.9)
LACTIC ACID, SEPSIS: 2.2 MMOL/L (ref 0.5–1.9)

## 2021-08-05 PROCEDURE — APPSS45 APP SPLIT SHARED TIME 31-45 MINUTES: Performed by: NURSE PRACTITIONER

## 2021-08-05 PROCEDURE — 99222 1ST HOSP IP/OBS MODERATE 55: CPT | Performed by: INTERNAL MEDICINE

## 2021-08-05 PROCEDURE — 36415 COLL VENOUS BLD VENIPUNCTURE: CPT

## 2021-08-05 PROCEDURE — 96372 THER/PROPH/DIAG INJ SC/IM: CPT

## 2021-08-05 PROCEDURE — 6370000000 HC RX 637 (ALT 250 FOR IP): Performed by: NURSE PRACTITIONER

## 2021-08-05 PROCEDURE — 96366 THER/PROPH/DIAG IV INF ADDON: CPT

## 2021-08-05 PROCEDURE — G0378 HOSPITAL OBSERVATION PER HR: HCPCS

## 2021-08-05 PROCEDURE — 6360000002 HC RX W HCPCS: Performed by: NURSE PRACTITIONER

## 2021-08-05 PROCEDURE — 2500000003 HC RX 250 WO HCPCS: Performed by: NURSE PRACTITIONER

## 2021-08-05 PROCEDURE — 6370000000 HC RX 637 (ALT 250 FOR IP): Performed by: EMERGENCY MEDICINE

## 2021-08-05 PROCEDURE — 2060000000 HC ICU INTERMEDIATE R&B

## 2021-08-05 PROCEDURE — 96365 THER/PROPH/DIAG IV INF INIT: CPT

## 2021-08-05 PROCEDURE — 96367 TX/PROPH/DG ADDL SEQ IV INF: CPT

## 2021-08-05 PROCEDURE — 94761 N-INVAS EAR/PLS OXIMETRY MLT: CPT

## 2021-08-05 PROCEDURE — 83605 ASSAY OF LACTIC ACID: CPT

## 2021-08-05 PROCEDURE — 6360000002 HC RX W HCPCS: Performed by: EMERGENCY MEDICINE

## 2021-08-05 RX ORDER — POLYETHYLENE GLYCOL 3350 17 G/17G
17 POWDER, FOR SOLUTION ORAL DAILY PRN
Status: DISCONTINUED | OUTPATIENT
Start: 2021-08-05 | End: 2021-08-05 | Stop reason: HOSPADM

## 2021-08-05 RX ORDER — HYDROXYZINE HYDROCHLORIDE 25 MG/1
50 TABLET, FILM COATED ORAL ONCE
Status: DISCONTINUED | OUTPATIENT
Start: 2021-08-05 | End: 2021-08-05

## 2021-08-05 RX ORDER — BUPRENORPHINE AND NALOXONE 8; 2 MG/1; MG/1
1 FILM, SOLUBLE BUCCAL; SUBLINGUAL 2 TIMES DAILY
Status: DISCONTINUED | OUTPATIENT
Start: 2021-08-05 | End: 2021-08-05 | Stop reason: HOSPADM

## 2021-08-05 RX ORDER — VILAZODONE HYDROCHLORIDE 20 MG/1
10 TABLET ORAL DAILY
Status: DISCONTINUED | OUTPATIENT
Start: 2021-08-05 | End: 2021-08-05

## 2021-08-05 RX ORDER — DOCUSATE SODIUM 100 MG/1
100 CAPSULE, LIQUID FILLED ORAL 2 TIMES DAILY PRN
Status: DISCONTINUED | OUTPATIENT
Start: 2021-08-05 | End: 2021-08-05 | Stop reason: HOSPADM

## 2021-08-05 RX ORDER — MAGNESIUM SULFATE 1 G/100ML
1000 INJECTION INTRAVENOUS PRN
Status: DISCONTINUED | OUTPATIENT
Start: 2021-08-05 | End: 2021-08-05 | Stop reason: HOSPADM

## 2021-08-05 RX ORDER — CLINDAMYCIN PHOSPHATE 600 MG/50ML
600 INJECTION INTRAVENOUS EVERY 8 HOURS
Status: DISCONTINUED | OUTPATIENT
Start: 2021-08-05 | End: 2021-08-05 | Stop reason: HOSPADM

## 2021-08-05 RX ORDER — LEVOFLOXACIN 5 MG/ML
750 INJECTION, SOLUTION INTRAVENOUS EVERY 24 HOURS
Status: DISCONTINUED | OUTPATIENT
Start: 2021-08-05 | End: 2021-08-05 | Stop reason: SDUPTHER

## 2021-08-05 RX ORDER — VILAZODONE HYDROCHLORIDE 20 MG/1
20 TABLET ORAL DAILY
Status: DISCONTINUED | OUTPATIENT
Start: 2021-08-05 | End: 2021-08-05 | Stop reason: HOSPADM

## 2021-08-05 RX ORDER — FLUTICASONE PROPIONATE 50 MCG
2 SPRAY, SUSPENSION (ML) NASAL DAILY
Status: DISCONTINUED | OUTPATIENT
Start: 2021-08-05 | End: 2021-08-05 | Stop reason: HOSPADM

## 2021-08-05 RX ORDER — POTASSIUM CHLORIDE 7.45 MG/ML
10 INJECTION INTRAVENOUS PRN
Status: DISCONTINUED | OUTPATIENT
Start: 2021-08-05 | End: 2021-08-05 | Stop reason: HOSPADM

## 2021-08-05 RX ORDER — TRAZODONE HYDROCHLORIDE 50 MG/1
50 TABLET ORAL NIGHTLY
Status: DISCONTINUED | OUTPATIENT
Start: 2021-08-05 | End: 2021-08-05 | Stop reason: SDUPTHER

## 2021-08-05 RX ORDER — SODIUM CHLORIDE 0.9 % (FLUSH) 0.9 %
5-40 SYRINGE (ML) INJECTION EVERY 12 HOURS SCHEDULED
Status: DISCONTINUED | OUTPATIENT
Start: 2021-08-05 | End: 2021-08-05 | Stop reason: HOSPADM

## 2021-08-05 RX ORDER — GABAPENTIN 300 MG/1
300 CAPSULE ORAL 3 TIMES DAILY
Status: DISCONTINUED | OUTPATIENT
Start: 2021-08-05 | End: 2021-08-05

## 2021-08-05 RX ORDER — SODIUM CHLORIDE 0.9 % (FLUSH) 0.9 %
10 SYRINGE (ML) INJECTION PRN
Status: DISCONTINUED | OUTPATIENT
Start: 2021-08-05 | End: 2021-08-05 | Stop reason: HOSPADM

## 2021-08-05 RX ORDER — CHLORHEXIDINE GLUCONATE 0.12 MG/ML
15 RINSE ORAL 2 TIMES DAILY
Status: DISCONTINUED | OUTPATIENT
Start: 2021-08-05 | End: 2021-08-05 | Stop reason: HOSPADM

## 2021-08-05 RX ORDER — LEVOFLOXACIN 500 MG/1
500 TABLET, FILM COATED ORAL DAILY
Qty: 7 TABLET | Refills: 0 | Status: SHIPPED | OUTPATIENT
Start: 2021-08-05 | End: 2021-08-12 | Stop reason: ALTCHOICE

## 2021-08-05 RX ORDER — LEVOFLOXACIN 5 MG/ML
500 INJECTION, SOLUTION INTRAVENOUS EVERY 24 HOURS
Status: DISCONTINUED | OUTPATIENT
Start: 2021-08-06 | End: 2021-08-05 | Stop reason: HOSPADM

## 2021-08-05 RX ORDER — TRAZODONE HYDROCHLORIDE 50 MG/1
50 TABLET ORAL NIGHTLY PRN
Status: DISCONTINUED | OUTPATIENT
Start: 2021-08-05 | End: 2021-08-05 | Stop reason: HOSPADM

## 2021-08-05 RX ORDER — POTASSIUM CHLORIDE 20 MEQ/1
40 TABLET, EXTENDED RELEASE ORAL PRN
Status: DISCONTINUED | OUTPATIENT
Start: 2021-08-05 | End: 2021-08-05 | Stop reason: HOSPADM

## 2021-08-05 RX ORDER — ONDANSETRON 2 MG/ML
4 INJECTION INTRAMUSCULAR; INTRAVENOUS EVERY 6 HOURS PRN
Status: DISCONTINUED | OUTPATIENT
Start: 2021-08-05 | End: 2021-08-05 | Stop reason: HOSPADM

## 2021-08-05 RX ORDER — BUDESONIDE AND FORMOTEROL FUMARATE DIHYDRATE 80; 4.5 UG/1; UG/1
2 AEROSOL RESPIRATORY (INHALATION) 2 TIMES DAILY
Status: DISCONTINUED | OUTPATIENT
Start: 2021-08-05 | End: 2021-08-05

## 2021-08-05 RX ORDER — CLINDAMYCIN PHOSPHATE 600 MG/50ML
600 INJECTION INTRAVENOUS EVERY 6 HOURS
Status: DISCONTINUED | OUTPATIENT
Start: 2021-08-05 | End: 2021-08-05 | Stop reason: SDUPTHER

## 2021-08-05 RX ORDER — SODIUM CHLORIDE 9 MG/ML
25 INJECTION, SOLUTION INTRAVENOUS PRN
Status: DISCONTINUED | OUTPATIENT
Start: 2021-08-05 | End: 2021-08-05 | Stop reason: HOSPADM

## 2021-08-05 RX ORDER — ONDANSETRON 4 MG/1
4 TABLET, ORALLY DISINTEGRATING ORAL EVERY 8 HOURS PRN
Status: DISCONTINUED | OUTPATIENT
Start: 2021-08-05 | End: 2021-08-05 | Stop reason: HOSPADM

## 2021-08-05 RX ORDER — HYDROXYZINE HYDROCHLORIDE 25 MG/1
25 TABLET, FILM COATED ORAL 3 TIMES DAILY PRN
Status: DISCONTINUED | OUTPATIENT
Start: 2021-08-05 | End: 2021-08-05 | Stop reason: HOSPADM

## 2021-08-05 RX ORDER — ACETAMINOPHEN 650 MG/1
650 SUPPOSITORY RECTAL EVERY 6 HOURS PRN
Status: DISCONTINUED | OUTPATIENT
Start: 2021-08-05 | End: 2021-08-05 | Stop reason: HOSPADM

## 2021-08-05 RX ORDER — CHLORHEXIDINE GLUCONATE 0.12 MG/ML
15 RINSE ORAL 2 TIMES DAILY
Qty: 420 ML | Refills: 0 | Status: SHIPPED | OUTPATIENT
Start: 2021-08-05 | End: 2021-08-19

## 2021-08-05 RX ORDER — ACETAMINOPHEN 325 MG/1
650 TABLET ORAL EVERY 6 HOURS PRN
Status: DISCONTINUED | OUTPATIENT
Start: 2021-08-05 | End: 2021-08-05 | Stop reason: HOSPADM

## 2021-08-05 RX ADMIN — BUPRENORPHINE AND NALOXONE 1 FILM: 8; 2 FILM BUCCAL; SUBLINGUAL at 08:39

## 2021-08-05 RX ADMIN — LEVOFLOXACIN 750 MG: 5 INJECTION, SOLUTION INTRAVENOUS at 01:35

## 2021-08-05 RX ADMIN — CLINDAMYCIN IN 5 PERCENT DEXTROSE 600 MG: 12 INJECTION, SOLUTION INTRAVENOUS at 03:41

## 2021-08-05 RX ADMIN — ENOXAPARIN SODIUM 40 MG: 40 INJECTION SUBCUTANEOUS at 08:39

## 2021-08-05 RX ADMIN — CLINDAMYCIN IN 5 PERCENT DEXTROSE 600 MG: 12 INJECTION, SOLUTION INTRAVENOUS at 10:33

## 2021-08-05 RX ADMIN — TRAZODONE HYDROCHLORIDE 50 MG: 50 TABLET ORAL at 01:16

## 2021-08-05 RX ADMIN — VILAZODONE HYDROCHLORIDE 20 MG: 20 TABLET ORAL at 08:39

## 2021-08-05 RX ADMIN — HYDROXYZINE HYDROCHLORIDE 25 MG: 25 TABLET, FILM COATED ORAL at 02:27

## 2021-08-05 ASSESSMENT — PAIN SCALES - GENERAL
PAINLEVEL_OUTOF10: 0

## 2021-08-05 ASSESSMENT — ENCOUNTER SYMPTOMS
NAUSEA: 0
TROUBLE SWALLOWING: 1
ABDOMINAL PAIN: 0
COUGH: 0
SHORTNESS OF BREATH: 0
COLOR CHANGE: 0
VOMITING: 0

## 2021-08-05 NOTE — H&P
[facility]  Office: [address], DO, Christa Gamble, DO, Mahamed Lyman, DO, Dunia Felipe Blood, DO, Marilee Limon MD, Mignon Meckel, MD, Herminia Sands MD, Sara Eubanks MD, Florecita Marquez MD, Estefania Casas MD, Moiz Franco MD, Alyx Way, DO, Rita San MD, Ana Alves, DO, Tray Colvin MD,  Girma Baires DO, Arleen Murry MD, Clara Gonzalez MD, Mayela Fuentes MD, Franchesca Pacheco MD, Lois Ferrer MD, Mine Forbes MD, Padmaja Mitchell MD, Gwendolyn Hitchcock, [facility], [address], CNP, Cathleen Bethea, CNP,  French, CNS, Andrew Reveles, CNP, Latrice Winkler, CNP, Rober Treviño, CNP, Laura Shah, CNP, Prasanth Ba, CNP, Randolph Oden PA-C, Erin Miranda [facility], Mckenzie Hoyos, CNP, Louise Chaves, CNP, Edward Dumont, CNP, Caryle Poet, CNP, Emy Mccloud, CNP, Kailash Martinez, CNP, Juan Antonio Chaves, CNP, [facility]    HISTORY AND PHYSICAL EXAMINATION            Date:   8/5/2021  Patient name:  Oxana Benjamin  Date of admission:  8/4/2021  9:54 PM  MRN:   [id number]  Account:  [de-identified]  YOB: 1990  PCP:    TOMY Angela CNP  Room:   1103/1103-01  Code Status:    Full Code    Chief Complaint:     Chief Complaint   Patient presents with    Oral Swelling       History Obtained From:     patient, electronic medical record    History of Present Illness:     Oxana Benjamin is a 32 y.o. Non- / non  female who presents with Oral Swelling   and is admitted to the hospital for the management of Sublingual abscess. 70-year-old female presented to the emergency room with continued right lower dental pain and swelling. She was seen in this emergency room 1 week ago and was diagnosed with a dental infection. She was discharged home with clindamycin and prednisone. She states that she finished her prednisone.   She is still taking her clindamycin and denies missing any doses.  She is some swelling to the floor of the mouth on the right side which began draining 3 days ago. At that point she thought she infection was starting to improve, however, it has worsened over the past 48 hours. She has increased swelling to the floor of the mouth on the right. She has increased right lower dental pain. She is still able to tolerate food carefully. She is unsure of her temperature but has felt feverish at times. No nausea or vomiting. She states that she has \" bad teeth and is in the process of getting a lot of work done\". She sees her dentist every 3 weeks and her next appointment is scheduled for next week. She did call her dentist office today to advise of the worsening of symptoms and was advised to come the emergency room. She is currently on Suboxone for a history of substance abuse including Xanax, cocaine and opiates. She was treated for cardiac arrest in 2019 after an overdose. And had acute on chronic kidney injury which required short-term dialysis. Most recent baseline creatinine is 0.9. Additional history includes hepatitis C, asthma, depression and bipolar disorder. Past Medical History:     Past Medical History:   Diagnosis Date    Abnormal Pap smear     Acute kidney failure (HCC)     Anemia     Anxiety     Asthma     [facility]     Cardiac arrest ([facility]     Cellulitis 2019    Depression     Dialysis patient ([facility]     Pt taken off approx 10/1/2019    Drug abuse ([facility]     gets opiates from [address] OPI 80s per day    H/O 29 wk indicated  delivery 2015    D/t fetal bradycardia at [facility] office, sent to [address] [place] [address] for STAT CS     Hepatitis C     History of substance abuse ([facility] 2020    Multiple, including opiates    HRP (high risk pregnancy) 2017    Kidney infection     Methadone maintenance 2014    6/18/15 Dose of 88mg confirmed with [facility]. - DISCHARGED from [facility] approximately 17  [facility] backline- [phone] Replacing Inactive Diagnoses  [facility] [phone]     Neurologic disorder     Opioid dependence with withdrawal ([facility] 2018    Polysubstance abuse ([facility]     Drug abuse includes Fentanyl, opoiates, benzos/Xanax, cocaine; pt is currently prescribed Suboxone    Polysubstance dependence ([facility] 2018    Opioid, cocaine, cannabis    Postpartum depression     RLTCS 17 M APG 8/9 Wt 7#12 2017    Seizures ([facility]     only when detoxing    Suicidal ideation     Ulceration     to right foot        Past Surgical History:     Past Surgical History:   Procedure Laterality Date    ABDOMEN SURGERY       SECTION N/A 2017     SECTION performed [facility]    [facility]      MD  DELIVERY ONLY  10-21-14    , Low Cervical        Medications Prior to Admission:     Prior to Admission medications    Medication Sig Start Date End Date Taking?  Authorizing Provider   clindamycin (CLEOCIN) 150 MG capsule Take 2 capsules by mouth 3 times daily for 10 days 21 Yes Sandy Schmidt PA-C   fluticasone [facility]) 50 MCG/ACT nasal spray 2 sprays by Nasal route daily 20 Yes Ephraim Bobo MD   SUBOXONE 8-2 MG FILM SL film PLACE 1 STRIP UNDER THE TONGUE TWICE DAILY 20  Yes Historical Provider, MD   hydrOXYzine (ATARAX) 25 MG tablet Take 1 tablet by mouth 3 times daily as needed for Anxiety 20  Yes Hong Vazquez MD   traZODone (DESYREL) 50 MG tablet Take 1 tablet by mouth nightly as needed for Sleep 20  Yes Deborah CORADO MD   albuterol sulfate HFA (PROVENTIL HFA) 108 (90 Base) MCG/ACT inhaler Inhale 2 puffs into the lungs every 6 hours as needed for Wheezing or Shortness of Breath 20   Ephraim Bobo MD   Mometasone Furo-Formoterol Fum 50-5 MCG/ACT AERO Inhale 2 puffs into the lungs 2 times daily 20   Ephraim Bobo MD   docusate sodium (COLACE) 100 MG capsule Take 1 capsule by mouth 2 times daily as needed for Constipation 20   Yvette Veras MD   gabapentin (NEURONTIN) 300 MG capsule Take 1 capsule by mouth 3 times daily for 30 days. 20  Sridevi Vallecillo MD   vilazodone HCl (VILAZODONE HCL) 10 MG TABS Take 1 tablet by mouth daily 20  Sridevi Vallecillo MD        Allergies:     Penicillins and Buspirone    Social History:     Tobacco:    reports that she has never smoked. She has never used smokeless tobacco.  Alcohol:      reports no history of alcohol use. Drug Use:  reports previous drug use. Drug: Cocaine. Family History:     Family History   Family history unknown: Yes       Review of Systems:     Positive and Negative as described in HPI. Review of Systems   Constitutional: Positive for chills and diaphoresis. Negative for activity change. HENT: Positive for dental problem and trouble swallowing. Negative for congestion. Respiratory: Negative for cough and shortness of breath. Cardiovascular: Negative for chest pain and palpitations. Gastrointestinal: Negative for abdominal pain, nausea and vomiting. Musculoskeletal: Negative for arthralgias and myalgias. Skin: Negative for color change and rash. Neurological: Negative for dizziness and headaches. Psychiatric/Behavioral: Negative for confusion and decreased concentration. Physical Exam:   /71   Pulse 82   Temp 99.1 °F (37.3 °C) (Oral)   Resp 12   Ht 5' 4\" (1.626 m)   Wt 211 lb 13.8 oz (96.1 kg)   SpO2 98%   BMI 36.37 kg/m²   Temp (24hrs), Av.9 °F (37.2 °C), Min:98.7 °F (37.1 °C), Max:99.1 °F (37.3 °C)    No results for input(s): POCGLU in the last 72 hours. No intake or output data in the 24 hours ending 21    Physical Exam  Constitutional:       Appearance: Normal appearance. She is not ill-appearing.    HENT:      Right Ear: External ear normal.      Left Ear: External ear normal. Mouth/Throat:      Dentition: Dental tenderness and dental caries present. Pharynx: Uvula midline. Eyes:      General:         Right eye: No discharge. Left eye: No discharge. Conjunctiva/sclera: Conjunctivae normal.   Neck:     Cardiovascular:      Rate and Rhythm: Normal rate and regular rhythm. Pulmonary:      Effort: Pulmonary effort is normal.      Breath sounds: Normal breath sounds. Abdominal:      Palpations: Abdomen is soft. Tenderness: There is no abdominal tenderness. Musculoskeletal:      Cervical back: Neck supple. Right lower leg: No edema. Left lower leg: No edema. Lymphadenopathy:      Cervical: Cervical adenopathy present. Skin:     General: Skin is warm and dry. Neurological:      General: No focal deficit present. Mental Status: She is alert and oriented to person, place, and time.    Psychiatric:         Mood and Affect: Mood normal.         Behavior: Behavior normal.         Investigations:      Laboratory Testing:  Recent Results (from the past 24 hour(s))   Basic Metabolic Prof    Collection Time: 08/04/21 10:27 PM   Result Value Ref Range    Glucose 82 70 - 99 mg/dL    BUN 17 6 - 20 mg/dL    CREATININE 0.93 (H) 0.50 - 0.90 mg/dL    Bun/Cre Ratio 18 9 - 20    Calcium 9.4 8.6 - 10.4 mg/dL    Sodium 137 135 - 144 mmol/L    Potassium 3.8 3.7 - 5.3 mmol/L    Chloride 98 98 - 107 mmol/L    CO2 28 20 - 31 mmol/L    Anion Gap 11 9 - 17 mmol/L    GFR Non-African American >60 >60 mL/min    GFR African American >60 >60 mL/min    GFR Comment          GFR Staging NOT REPORTED    CBC with DIFF    Collection Time: 08/04/21 10:27 PM   Result Value Ref Range    WBC 7.6 3.5 - 11.3 k/uL    RBC 4.19 3.95 - 5.11 m/uL    Hemoglobin 12.7 11.9 - 15.1 g/dL    Hematocrit 37.1 36.3 - 47.1 %    MCV 88.5 82.6 - 102.9 fL    MCH 30.3 25.2 - 33.5 pg    MCHC 34.2 28.4 - 34.8 g/dL    RDW 12.0 11.8 - 14.4 %    Platelets 176 580 - 456 k/uL    MPV 9.4 8.1 - 13.5 fL    NRBC Automated 0.0 0.0 per 100 WBC    Differential Type NOT REPORTED     Seg Neutrophils 41 36 - 65 %    Lymphocytes 46 (H) 24 - 43 %    Monocytes 8 3 - 12 %    Eosinophils % 3 1 - 4 %    Basophils 1 0 - 2 %    Immature Granulocytes 1 (H) 0 %    Segs Absolute 3.09 1.50 - 8.10 k/uL    Absolute Lymph # 3.58 1.10 - 3.70 k/uL    Absolute Mono # 0.64 0.10 - 1.20 k/uL    Absolute Eos # 0.22 0.00 - 0.44 k/uL    Basophils Absolute 0.04 0.00 - 0.20 k/uL    Absolute Immature Granulocyte 0.05 0.00 - 0.30 k/uL    WBC Morphology NOT REPORTED     RBC Morphology NOT REPORTED     Platelet Estimate NOT REPORTED    Lactate, Sepsis    Collection Time: 08/04/21 10:27 PM   Result Value Ref Range    Lactic Acid, Sepsis 0.8 0.5 - 1.9 mmol/L    Lactic Acid, Sepsis, Whole Blood NOT REPORTED 0.5 - 1.9 mmol/L   Lactate, Sepsis    Collection Time: 08/05/21  1:37 AM   Result Value Ref Range    Lactic Acid, Sepsis 2.2 (H) 0.5 - 1.9 mmol/L    Lactic Acid, Sepsis, Whole Blood NOT REPORTED 0.5 - 1.9 mmol/L       Imaging/Diagnostics:  CT SOFT TISSUE NECK W CONTRAST    Result Date: 8/5/2021  Loculated collection along for floor of mouth/sublingual space anteriorly possibly rising from the right 1st maxillary molar is worrisome for sublingual abscess. .       Assessment :      Hospital Problems         Last Modified POA    * (Principal) Sublingual abscess 8/5/2021 Yes    Chronic hepatitis C without hepatic coma ([address] 8/5/2021 Yes    Moderate persistent asthma without complication 9/0/9101 Yes    Bipolar I disorder, most recent episode depressed ([address] (Chronic) 8/5/2021 Yes    History of substance abuse ([address] 8/5/2021 Yes    Overview Signed 12/18/2020  8:01 AM by Elsy Pierre MD     Multiple, including opiates               Plan:     Patient status inpatient in the  Progressive Unit/Step down    1.  Sublingual abscess   Telemetry/continuous pulse ox   Monitor airway closely   ENT

## 2021-08-05 NOTE — PROGRESS NOTES
Physical Therapy  DATE: 2021    NAME: Cindi Thomson  MRN: [id number]   : 1990    Patient not seen this date for Physical Therapy due to:  [] Blood transfusion in progress  [] Cancel by RN  [] Hemodialysis  []  Refusal by Patient   [] Spine Precautions   [] Strict Bedrest  [] Surgery  [] Testing      [] Other        [x] PT being discontinued at this time. Patient independent. No further needs. [] PT being discontinued at this time as the patient has been transferred to hospice care. No further needs.     Rhiannon Harrison, PT

## 2021-08-05 NOTE — ED PROVIDER NOTES
[address] ED  eMERGENCY dEPARTMENT eNCOUnter      Pt Name: Anahi Rubio  MRN: [id number]  [place] 1990  Date of evaluation: 8/4/2021  Provider: TOMY Connell CNP    CHIEF COMPLAINT       Chief Complaint   Patient presents with    Oral Swelling         HISTORY OF PRESENT ILLNESS  (Location/Symptom, Timing/Onset, Context/Setting, Quality, Duration, Modifying Factors, Severity.)   Anahi Rubio is a 32 y.o. female who presents to the emergency department via private auto for swelling to the floor of her mouth, dental pain. Onset was a few days ago. She was evaluated here 7/28/21. She was placed on clindamycin. States the swelling has gotten worse. Denies fever, chills, SOB, difficulty swallowing. Rates her pain 8/10 at this time. Denies chance of pregnancy. Nursing Notes were reviewed. ALLERGIES     Penicillins and Buspirone    CURRENT MEDICATIONS       Previous Medications    ALBUTEROL SULFATE HFA (PROVENTIL HFA) 108 (90 BASE) MCG/ACT INHALER    Inhale 2 puffs into the lungs every 6 hours as needed for Wheezing or Shortness of Breath    CLINDAMYCIN (CLEOCIN) 150 MG CAPSULE    Take 2 capsules by mouth 3 times daily for 10 days    DOCUSATE SODIUM (COLACE) 100 MG CAPSULE    Take 1 capsule by mouth 2 times daily as needed for Constipation    FLUTICASONE (FLONASE) 50 MCG/ACT NASAL SPRAY    2 sprays by Nasal route daily    GABAPENTIN (NEURONTIN) 300 MG CAPSULE    Take 1 capsule by mouth 3 times daily for 30 days.     HYDROXYZINE (ATARAX) 25 MG TABLET    Take 1 tablet by mouth 3 times daily as needed for Anxiety    MOMETASONE FURO-FORMOTEROL FUM 50-5 MCG/ACT AERO    Inhale 2 puffs into the lungs 2 times daily    SUBOXONE 8-2 MG FILM SL FILM    PLACE 1 STRIP UNDER THE TONGUE TWICE DAILY    TRAZODONE (DESYREL) 50 MG TABLET    Take 1 tablet by mouth nightly as needed for Sleep    VILAZODONE HCL (VILAZODONE HCL) 10 MG TABS    Take 1 tablet by mouth daily       PAST MEDICAL HISTORY Diagnosis Date    Abnormal Pap smear     Acute kidney failure (HCC)     Anemia     Anxiety     Asthma     Bipolar 1 disorder (HCC)     [facility] arrest ([facility]     Cellulitis 2019    Depression     D[facility]     Pt taken off approx 10/1/2019    Drug abuse ([facility]     gets opiates from [address] OPI 80s per day    H/O 29 wk indicated  delivery 2015    D/t fetal bradycardia at [facility] office, sent to [facility] for STAT CS     Hepatitis C     History of substance abuse ([facility] 2020    Multiple, including opiates    HRP (high risk pregnancy) 2017    Kidney infection     Methadone maintenance 2014    6/18/15 Dose of 88mg confirmed with [facility]. - DISCHARGED from [facility] approximately 17  [facility] backline- [phone] Replacing Inactive Diagnoses  [facility] [phone]     Neurologic disorder     Opioid dependence with withdrawal ([facility] 2018    Polysubstance abuse ([facility]     Drug abuse includes Fentanyl, opoiates, benzos/Xanax, cocaine; pt is currently prescribed Suboxone    [facility] 2018    Opioid, cocaine, cannabis    Postpartum depression     RLTCS 17 M APG 8/9 Wt 7#12 2017    Seizures ([facility]     only when detoxing    Suicidal ideation     Ulceration     to right foot       SURGICAL HISTORY           Procedure Laterality Date    ABDOMEN SURGERY       SECTION N/A 2017     SECTION [facility] Cat [facility] at [facility] L[facility]D OR    [facility]      [place]  DELIVERY ONLY  10-21-14    , Low Cervical         FAMILY HISTORY           Family history unknown: Yes     Family Status   Relation Name Status    Mother  Alive    Father  Alive        SOCIAL HISTORY      reports that she has never smoked. She has never used smokeless tobacco. She reports previous drug use. Drug: Cocaine. She reports that she does not drink alcohol.     REVIEW OF SYSTEMS    (2-9 systems for level 4, 10 or more for level 5)     Review of Systems   Constitutional: Negative for chills, diaphoresis, fatigue and fever. HENT: Positive for dental problem and facial swelling. Negative for drooling, rhinorrhea, sore throat, trouble swallowing and voice change. Eyes: Negative for photophobia, pain and visual disturbance. Respiratory: Negative for cough and shortness of breath. Cardiovascular: Negative for chest pain. Gastrointestinal: Negative for abdominal pain. Musculoskeletal: Negative for arthralgias, myalgias, neck pain and neck stiffness. Skin: Negative for color change, rash and wound. Neurological: Negative for dizziness, weakness and headaches. Except as noted above the remainder of the review of systems was reviewed and negative. PHYSICAL EXAM    (up to 7 for level 4, 8 or more for level 5)     ED Triage Vitals [08/04/21 2049]   BP Temp Temp Source Pulse Resp SpO2 Height Weight   128/81 98.7 °F (37.1 °C) Oral 84 16 100 % 5' 4\" (1.626 m) 204 lb (92.5 kg)     Physical Exam  Vitals reviewed. Constitutional:       General: She is not in acute distress. Appearance: She is well-developed. She is not diaphoretic. HENT:      Right Ear: External ear normal.      Left Ear: External ear normal.      Mouth/Throat:      Lips: Pink. Mouth: Mucous membranes are moist.      Pharynx: Oropharynx is clear. Uvula midline. No pharyngeal swelling, oropharyngeal exudate or uvula swelling. Comments: Swelling to right floor of mouth. Patient is handling secretions well, speaking in full sentences. Eyes:      General: No scleral icterus. Conjunctiva/sclera: Conjunctivae normal.   Cardiovascular:      Rate and Rhythm: Normal rate. Pulmonary:      Effort: Pulmonary effort is normal. No respiratory distress. Breath sounds: No stridor. Musculoskeletal:      Cervical back: Neck supple. No rigidity. Comments: Moves extremities   Lymphadenopathy:      Cervical: Cervical adenopathy present.    Skin: General: Skin is warm and dry. Findings: No rash. Neurological:      Mental Status: She is alert and oriented to person, place, and time. Psychiatric:         Behavior: Behavior normal.           DIAGNOSTIC RESULTS     RADIOLOGY:   Non-plain film images such as CT, Ultrasound and MRI are read by the radiologist. Plain radiographic images are visualized and preliminarily interpreted by the emergency physician with the below findings:    Interpretation per the Radiologist below, if available at the time of this note:    CT SOFT TISSUE NECK W CONTRAST    Result Date: 8/5/2021  EXAMINATION: CT OF THE NECK SOFT TISSUE WITH CONTRAST  8/4/2021 TECHNIQUE: CT of the neck was performed with the administration of intravenous contrast. Multiplanar reformatted images are provided for review. Dose modulation, iterative reconstruction, and/or weight based adjustment of the mA/kV was utilized to reduce the radiation dose to as low as reasonably achievable. COMPARISON: CT facial bones 12/04/2019 HISTORY: ORDERING SYSTEM PROVIDED HISTORY: dental abscess right lower jaw area, swelling to floor of mouth. TECHNOLOGIST PROVIDED HISTORY: dental abscess right lower jaw area, swelling to floor of mouth. Decision Support Exception - unselect if not a suspected or confirmed emergency medical condition->Emergency Medical Condition (MA) Reason for Exam: dental abscess right lower jaw area, swelling to floor of mouth. Acuity: Acute Type of Exam: Initial FINDINGS: PHARYNX/LARYNX:  The palatine tonsils are normal in appearance. The tongue is normal in appearance. The valleculae, epiglottis, aryepiglottic folds and pyriform sinuses appear unremarkable. The true and false vocal cords are normal in appearance. There is a 1.5 cm loculated collection along floor of mouth. Anteriorly it appears to be displacing lingual thyroid glands posteriorly and medially. This measures 1.7 x 0.8 x 1.5 cm.   This may be rising from the apical lucency involving the right 1st molar however no definite cortical breakthrough identified. SALIVARY GLANDS/THYROID:  The parotid and submandibular glands appear unremarkable. The thyroid gland appears unremarkable. LYMPH NODES:  Mildly prominent submental lymph nodes likely reactive. SOFT TISSUES:  No additional soft tissue swelling or mass is seen. BRAIN/ORBITS/SINUSES:  The visualized portion of the intracranial contents appear unremarkable. The visualized portion of the orbits, paranasal sinuses and mastoid air cells demonstrate no acute abnormality. LUNG APICES/SUPERIOR MEDIASTINUM:  No focal consolidation is seen within the visualized lung apices. No superior mediastinal lymphadenopathy or mass. The visualized portion of the trachea appears unremarkable. BONES:  No aggressive appearing lytic or blastic bony lesion. Loculated collection along for floor of mouth/sublingual space anteriorly possibly rising from the right 1st maxillary molar is worrisome for sublingual abscess. Judi Anders LABS:  Labs Reviewed   BASIC METABOLIC PANEL   CBC WITH AUTO DIFFERENTIAL   LACTATE, SEPSIS   LACTATE, SEPSIS       All other labs were within normal range or not returned as of this dictation. EMERGENCY DEPARTMENT COURSE and DIFFERENTIAL DIAGNOSIS/MDM:   Vitals:    Vitals:    08/04/21 2049   BP: 128/81   Pulse: 84   Resp: 16   Temp: 98.7 °F (37.1 °C)   TempSrc: Oral   SpO2: 100%   Weight: 204 lb (92.5 kg)   Height: 5' 4\" (1.626 m)       CLINICAL DECISION MAKING:  The patient presented alert with a nontoxic appearance and was seen in conjunction with Dr. Megan York. CT scan was pending. Care was resumed to the physician. See his dictation for further evaluation and treatment.            (Please note that portions of this note were completed with a voice recognition program.  Efforts were made to edit the dictations but occasionally words are mis-transcribed.)    TOMY Thomas CNP, APRN - CNP  08/06/21 Kristi Espitia

## 2021-08-05 NOTE — ED PROVIDER NOTES
EMERGENCY DEPARTMENT ENCOUNTER   ATTENDING ATTESTATION     Pt Name: Rosette Starkey  MRN: [id number]  [place] 1990  Date of evaluation: 8/5/21   Rosette Starkey is a 32 y.o. female with CC: Oral Swelling    MDM:   49-year-old female with sublingual infection. CT scan shows evidence of subungual abscess. Patient has anaphylactic reaction to penicillin. Started on clindamycin and Levaquin. Airway patent, no difficulties breathing. Discussed case with Harjinder Winkler, who accepts patient for admission to hospital.             EKG: All EKG's are interpreted by the Emergency Department Physician who either signs or Co-signs this chart in the absence of a cardiologist.      RADIOLOGY:All plain film, CT, MRI, and formal ultrasound images (except ED bedside ultrasound) are read by the radiologist, see reports below, unless otherwise noted in MDM or here. CT SOFT TISSUE NECK W CONTRAST   Final Result   Loculated collection along for floor of mouth/sublingual space anteriorly   possibly rising from the right 1st maxillary molar is worrisome for   sublingual abscess. Wes Jackson LABS: All lab results were reviewed by myself, and all abnormals are listed below. Labs Reviewed   BASIC METABOLIC PANEL - Abnormal; Notable for the following components:       Result Value    CREATININE 0.93 (*)     All other components within normal limits   CBC WITH AUTO DIFFERENTIAL - Abnormal; Notable for the following components:    Lymphocytes 46 (*)     Immature Granulocytes 1 (*)     All other components within normal limits   LACTATE, SEPSIS   LACTATE, SEPSIS     CONSULTS:  IP CONSULT TO INTERNAL MEDICINE  FINAL IMPRESSION      1.  Sublingual abscess            PASTMEDICAL HISTORY     Past Medical History:   Diagnosis Date    Abnormal Pap smear     Acute kidney failure (HCC)     Anemia     Anxiety     Asthma     B[facility]     Cardiac arrest ([facility]     Cellulitis 7/18/2019    Depression     Dialysis patient HYDROXYZINE (ATARAX) 25 MG TABLET    Take 1 tablet by mouth 3 times daily as needed for Anxiety    MOMETASONE FURO-FORMOTEROL FUM 50-5 MCG/ACT AERO    Inhale 2 puffs into the lungs 2 times daily    SUBOXONE 8-2 MG FILM SL FILM    PLACE 1 STRIP UNDER THE TONGUE TWICE DAILY    TRAZODONE (DESYREL) 50 MG TABLET    Take 1 tablet by mouth nightly as needed for Sleep    VILAZODONE HCL (VILAZODONE HCL) 10 MG TABS    Take 1 tablet by mouth daily     ALLERGIES     is allergic to penicillins and buspirone. FAMILY HISTORY     She indicated that her mother is alive. She indicated that her father is alive. SOCIAL HISTORY       Social History     Tobacco Use    Smoking status: Never Smoker    Smokeless tobacco: Never Used   Vaping Use    Vaping Use: Never assessed   Substance Use Topics    Alcohol use: No     Alcohol/week: 0.0 standard drinks     Comment: prior abuse    Drug use: Not Currently     Types: Cocaine     Comment: Drug abuse includes Fentanyl, opoiates, benzos/Xanax, cocaine; pt is currently prescribed Suboxone       I personally evaluated and examined the patient in conjunction with the APC and agree with the assessment, treatment plan, and disposition of the patient as recorded by the APC.    Mary Harden MD  Attending Emergency Physician           Gaston Lowe MD  08/05/21 6420

## 2021-08-05 NOTE — PROGRESS NOTES
Occupational [address]  Occupational Therapy Not Seen Note    Patient not available for Occupational Therapy due to:    [] Testing:    [] Hemodialysis    [] Cancelled by RN:    []Refusal by Patient:    [] Surgery:     [] Intubation:     [] Pain Medication:    [] Sedation:     [] Spine Precautions :    [] Medical Instability:    [x] Other: Pt is independent with ADL in room  and does not need OT services. OT to discontinue the evaluation order.

## 2021-08-05 NOTE — PLAN OF CARE
Problem: Falls - Risk of:  Goal: Will remain free from falls  Description: Will remain free from falls  8/5/2021 1011 by Brynn Cullen RN  Outcome: Ongoing  Note: Patient is a fall risk during this admission. Fall risk assessment was performed. Patient is absent of falls. Bed is in the lowest position. Wheels on the bed are locked. Call light and bed side table are within reach. Clutter is removed. Patient was educated to call out when needing assistance or wanting to get out of bed. Patient offered toileting assistance during rounding. Hourly rounds have been performed. Problem: Pain:  Goal: Pain level will decrease  Description: Pain level will decrease  Outcome: Ongoing  Note: Pt medicated with pain medication prn. Assessed all pain characteristics including level, type, location, frequency, and onset. Non-pharmacologic interventions offered to pt as well. Pt states pain is tolerable at this time. Will continue to monitor      Problem: Skin Integrity:  Goal: Will show no infection signs and symptoms  Description: Will show no infection signs and symptoms  Outcome: Ongoing  Note: Skin assessment performed. No new signs of skin breakdown. Patient repositions self.

## 2021-08-05 NOTE — CONSULTS
[address] [facility] [address]                [address] [place] [address]                                  CONSULTATION    PATIENT NAME: Melissa Lyman                     :        1990  MED REC NO:   [id number]                             ROOM:       5948  ACCOUNT NO:   [de-identified]                           ADMIT DATE: 2021  PROVIDER:     Morales Styles OF CONSULTATION:  2021    HISTORY OF PRESENT ILLNESS:  This is a 70-year-old who was admitted 1  day prior to this dictation with a dental abscess. The reviewers refer to the chart for definitive details. The patient admits to an extended history of end-stage dentition. She  reports seeing her private dentist every 3 weeks for dental  restorations. Apparently, these have included planned dental  extractions, root canals. She denies past history of dental abscess. She admits to a greater than  1-week history of right lower jaw pain and floor of mouth edema and  erythema. She was initially seen through the [address] [place] [address] Emergency Room  1 week prior. Dental infection was diagnosed. She was treated on an  outpatient basis with prednisone, clindamycin. Prednisone course was  completed. She was utilizing clindamycin as recommended. She then  admits to spontaneous purulent drainage from the right floor of mouth 3  days prior to this dictation. She admitted to immediate relief in her  signs/symptoms. Unfortunately, however, she described worsening right  floor of mouth pain and swelling over the past 48 hours. She denies respiratory difficulties or dysphagia. She denies  significant odynophagia. Medical history is significant for multiple substance abuse including  Xanax, cocaine, opiates. She does utilize Suboxone. She was treated  for cardiac arrest 2019. Following reported overdose, there was  associated acute-on-chronic kidney injury requiring short-term dialysis.     CT neck with contrast was obtained upon presentation. Study was  reviewed by myself. This suggested at minimum periapical lucency about  the right first maxillary molar. In addition, there was described 1.5  cm loculated collection along the right floor of mouth/sublingual space. The patient was admitted and begun on IV Levaquin, Cleocin. Overall,  she denies significant improvement post admission. PAST MEDICAL HISTORY:  As above, hepatitis C, reactive airway disease,  depression, bipolar disorder, abnormal Pap smear, anemia, anxiety,  cellulitis, kidney infection, neurologic disorder, postpartum  depression, detox-related seizures, right foot ulceration, suicidal  ideation. PAST SURGICAL HISTORY:   section, LEEP, abdominal surgery. ALLERGIES:  PENICILLIN, BUSPIRONE. SOCIAL HISTORY:  As above. Denies smoking. Denies smokeless tobacco  use. Denies alcohol use. PHYSICAL EXAMINATION:  VITAL SIGNS:  Afebrile, maximum temperature 99.1, heart rate 89,  respiratory rate 11, 95/82, O2 saturation 98% on room air. Weight 211  pounds. LABORATORY DATA:  Lactate elevated at 2.2. On 2021, lactate  within normal limits at 0.8. WBC 7.6, hemoglobin 12.7, platelets 517. BUN 17, creatinine 0.93. PRESENT MEDICATIONS:  Include but are not limited to, Cleocin, Levaquin,  Flonase, Suboxone, Lovenox, Viibryd. Bedside exam revealed a sleepy but arousable 32year-old. She was in no  acute distress. Her voice remained strong and intact and grossly  unremarkable. Facial exam was unremarkable. There was questionable  diffuse chronic actinic changes. Bilateral ear exam was unremarkable. She denied otalgia. Anterior rhinoscopy revealed pale mucosal edema,  but without gross mass, polyp, or mucopurulence. Nasal septum was  mildly irregular. Neck exam visually was unremarkable. Palpation of  the neck was unremarkable. The right submandibular area was only mildly  tender with manipulation. Oral exam revealed no trismus. There was  diffuse end-stage dentition. The tongue was grossly unremarkable. The  oropharynx was widely patent. The soft palate and uvula were  unremarkable. There was 1+ tonsil tissue only. Of note was moderate  erythema and edema about the right floor of mouth. About the apex,  there was whitish eschar potentially representing past spontaneous  drainage site. There was no associated purulence. There was no  significant elevation of the tongue. The tongue remained well  positioned in the oral cavity. IMPRESSION:  Primary dental infection with associated dental abscess. No immediate intervention is required from an Otolaryngology standpoint,  including alternative airway - tracheostomy, oral endotracheal  intubation. Airway remains widely patent on both bedside exam and per  CT imaging. The patient is without airway complaints, saturating well,  and in no respiratory distress. RECOMMENDATIONS:  Recommend oral maxillofacial consultation for  definitive workup, therapy including potential I and D of right floor of  mouth/sublingual abscess. The patient appeared to understand these plans and considerations. All  questions were answered. The above was discussed with the Department of  Nursing.   Please do not hesitate to contact myself with any specific  questions regarding the above exam.        Jennifer Aparicio    D: 08/05/2021 11:56:06       T: 08/05/2021 12:02:47     [email or web address]  Job#: [id number]     Doc#: [id number]    CC:

## 2021-08-05 NOTE — DISCHARGE SUMMARY
[facility]  Office: [address], DO, Levell Ebbs, DO, Tejas Morris, DO, Melodiemegan Mtz Blood, DO, Girma Ferrer MD, Americo Han MD, Cornelio Arce MD, Trip Amos MD, Lynne Bartlett MD, Ginna Andrews MD, Alley Chaves MD, Yobani Zaidi, DO, No Craig MD, Leonardo Varner, DO, Mar Corbett MD,  Arnav Matthew, DO, Savi Rondon MD, Jacobo Priest MD, Raenette Felty, MD, Johnny Dominguez MD, Jean Pan MD, Nikko Lindsay MD, Malena Tian MD, Hali Arndt [facility], [facility] Gallo, CNP, Minnie Ellis, CNP, Killian Holden, CNS, Nate Blackburn, CNP, Kirstin Cannon, CNP, Clarice Bueno, CNP, Paulo Valadez, CNP, Dory Church, CNP, Zafar Smith PA-C, Fiorella Casey, [facility], Elijah Cabezas, CNP, Tam Becker, CNP, Tatum Burroughs, CNP, Siddhartha Ortez, CNP, Darshan Howell, CNP, Yvette Cullen, CNP, Rambo Hamilton, CNP, Anil Rubalcava, [facility]    Discharge Summary     Patient ID: Shaheed Riojas  :  1990   MRN: [id number]     ACCOUNT:  [de-identified]   Patient's PCP: TOMY James CNP  Admit Date: 2021   Discharge Date: 2021     Length of Stay: 0  Code Status:  Full Code  Admitting Physician: Noé Cat DO  Discharge Physician: Noé Cat DO     Active Discharge Diagnoses:     Hospital Problem Lists:  Principal Problem:    Sublingual abscess  Active Problems:    Chronic hepatitis C without hepatic coma (HCC)    Moderate persistent asthma without complication    Bipolar I disorder, most recent episode depressed ([facility] [address]    History of substance abuse [facility])  Resolved Problems:    * No resolved hospital problems.  *      Admission Condition:  fair     Discharged Condition: stable    Hospital Stay:     Hospital Course:  Shaheed Riojas is a 32 y.o. female who was admitted for the management of  Sublingual abscess , presented to ER with Oral Swelling    This is a 69-year-old female dealing with dental caries has been followed with dentistry. She has developed oral swelling and subsequent drainage with sublingual abscess. She returns the emergency room with worsening symptoms and was admitted for evaluation. She underwent ENT consultation and there was no obstruction of the airway and no surgical intervention required from their perspective. Recommend outpatient follow-up with dentistry/oral surgery for continued management. She had Levaquin added to her clindamycin and showed improvement here. Levaquin has been transitioned to oral. She is discharged in stable condition with instruction to follow-up with dentistry on the 12th as scheduled. Significant therapeutic interventions: IV clindamycin, Levaquin, ENT consult    Significant Diagnostic Studies:   Labs / Micro:  CBC:   Lab Results   Component Value Date    WBC 7.6 08/04/2021    RBC 4.19 08/04/2021    RBC 3.97 05/01/2012    HGB 12.7 08/04/2021    HCT 37.1 08/04/2021    MCV 88.5 08/04/2021    MCH 30.3 08/04/2021    MCHC 34.2 08/04/2021    RDW 12.0 08/04/2021     08/04/2021     05/01/2012     BMP:    Lab Results   Component Value Date    GLUCOSE 82 08/04/2021    GLUCOSE 91 09/25/2011     08/04/2021    K 3.8 08/04/2021    CL 98 08/04/2021    CO2 28 08/04/2021    ANIONGAP 11 08/04/2021    BUN 17 08/04/2021    CREATININE 0.93 08/04/2021    BUNCRER 18 08/04/2021    CALCIUM 9.4 08/04/2021    LABGLOM >60 08/04/2021    GFRAA >60 08/04/2021    GFR      08/04/2021    GFR NOT REPORTED 08/04/2021        Radiology:  CT SOFT TISSUE NECK W CONTRAST    Result Date: 8/5/2021  Loculated collection along for floor of mouth/sublingual space anteriorly possibly rising from the right 1st maxillary molar is worrisome for sublingual abscess. .       Consultations:    Consults:     Final Specialist Recommendations/Findings:   IP CONSULT TO INTERNAL MEDICINE  IP CONSULT TO OTOLARYNGOLOGY  IP CONSULT TO ORAL SURGERY      The patient was seen and examined on day of discharge and this discharge summary is in conjunction with any daily progress note from day of discharge. Discharge plan:     Disposition: Home    Physician Follow Up:   PCP 1 week  Dr. Jassi Smalls -DDS  [address] [facility] [address]   [id number]  [phone]  On 8/12/2021  Appointment at 3:00. Will call if they have any cancellations. Requiring Further Evaluation/Follow Up POST HOSPITALIZATION/Incidental Findings: None    Diet: Dental soft    Activity: As tolerated    Instructions to Patient: Take medication as prescribed    Discharge Medications:      Medication List      START taking these medications    chlorhexidine 0.12 % solution  Commonly known as: PERIDEX  Take 15 mLs by mouth 2 times daily for 14 days     levoFLOXacin 500 MG tablet  Commonly known as: LEVAQUIN  Take 1 tablet by mouth daily for 7 days        CONTINUE taking these medications    albuterol sulfate  (90 Base) MCG/ACT inhaler  Commonly known as: Proventil HFA  Inhale 2 puffs into the lungs every 6 hours as needed for Wheezing or Shortness of Breath     clindamycin 150 MG capsule  Commonly known as: Cleocin  Take 2 capsules by mouth 3 times daily for 10 days     docusate sodium 100 MG capsule  Commonly known as: Colace  Take 1 capsule by mouth 2 times daily as needed for Constipation     fluticasone 50 MCG/ACT nasal spray  Commonly known as: Flonase  2 sprays by Nasal route daily     gabapentin 300 MG capsule  Commonly known as: NEURONTIN  Take 1 capsule by mouth 3 times daily for 30 days.      hydrOXYzine 25 MG tablet  Commonly known as: ATARAX  Take 1 tablet by mouth 3 times daily as needed for Anxiety     Mometasone Furo-Formoterol Fum 50-5 MCG/ACT Aero  Inhale 2 puffs into the lungs 2 times daily     Suboxone 8-2 MG Film SL film  Generic drug: buprenorphine-naloxone     traZODone 50 MG tablet  Commonly known as: DESYREL  Take 1 tablet by mouth nightly as needed for Sleep     vilazodone HCl 10 MG Tabs  Commonly known as: vilazodone hcl  Take 1 tablet by mouth daily           Where to Get Your Medications      These medications were sent to [facility] [place], [address] [place] [address]  [address], [address]    Phone: [phone]   · chlorhexidine 0.12 % solution  · levoFLOXacin 500 MG tablet         No discharge procedures on file. Time Spent on discharge is  27 minutes in patient examination, evaluation, counseling as well as medication reconciliation, prescriptions for required medications, discharge plan and follow up. Electronically signed by   Butch Yoder DO  8/5/2021  5:29 PM      Thank you Dr. Allegra Neil, APRN - CNP for the opportunity to be involved in this patient's care.

## 2021-08-05 NOTE — CARE COORDINATION
Case Management Initial Discharge Plan  Bebe Medley,             Met with:patient to discuss discharge plans. Information verified: address, contacts, phone number, , insurance Yes  Insurance Provider: [employer]    Emergency Contact/Next of Kin name & number: bere/Aiyana   [phone]  Who are involved in patient's support system? Self and amother    PCP: TOMY Richter - CNP  Date of last visit: Dr. Emeli Perez- over 6 months ago      Discharge Planning    Living Arrangements:  Parent     Home has 1 stories  3 stairs to climb to get into front door, 0stairs to climb to reach second floor  Location of bedroom/bathroom in home main    Patient able to perform ADL's:Independent    Current Services (outpatient & in home) none  DME equipment: 0  DME provider: 0    Is patient receiving oral anticoagulation therapy? No    If indicated:   Physician managing anticoagulation treatment:   Where does patient obtain lab work for ATC treatment? Potential Assistance Needed:  N/A    Patient agreeable to home care: No  [place] of choice provided:  n/a    Prior SNF/Rehab Placement and Facility: n/a  Agreeable to SNF/Rehab: No  [place] of choice provided: n/a     Evaluation: no    Expected Discharge date:  21    Patient expects to be discharged to: If home: is the family and/or caregiver wiling & able to provide support at home? yes  Who will be providing this support? Self and mother*    Follow Up Appointment: Best Day/ Time: Tuesday AM    Transportation provider: self  Transportation arrangements needed for discharge: No    Readmission Risk              Risk of Unplanned Readmission:  10             Does patient have a readmission risk score greater than 14?: No  If yes, follow-up appointment must be made within 7 days of discharge.      Goals of Care:       Educated patient on transitional options, provided freedom of choice and are agreeable with plan      Discharge Plan:

## 2021-08-05 NOTE — CARE COORDINATION
Spoke with the patient. Her Dentist is Dr. Tamie Herman. Patient has an appointment on 8/12. Writer called to see if they could get her in sooner. There are no cancellations at this time. But they will call the patient if there is a cancellation. The appointment is now in the discharge summary.

## 2021-08-06 ASSESSMENT — ENCOUNTER SYMPTOMS
VOICE CHANGE: 0
EYE PAIN: 0
PHOTOPHOBIA: 0
ABDOMINAL PAIN: 0
COLOR CHANGE: 0
SHORTNESS OF BREATH: 0
RHINORRHEA: 0
FACIAL SWELLING: 1
TROUBLE SWALLOWING: 0
COUGH: 0
SORE THROAT: 0

## 2021-08-11 PROBLEM — L70.9 ACNE: Status: ACTIVE | Noted: 2021-04-28

## 2021-08-11 PROBLEM — R56.9 SEIZURE (HCC): Status: ACTIVE | Noted: 2018-03-07

## 2021-08-11 PROBLEM — F14.20 COCAINE DEPENDENCE (HCC): Status: ACTIVE | Noted: 2018-05-10

## 2021-08-11 SDOH — ECONOMIC STABILITY: TRANSPORTATION INSECURITY
IN THE PAST 12 MONTHS, HAS THE LACK OF TRANSPORTATION KEPT YOU FROM MEDICAL APPOINTMENTS OR FROM GETTING MEDICATIONS?: NO

## 2021-08-11 SDOH — ECONOMIC STABILITY: FOOD INSECURITY: WITHIN THE PAST 12 MONTHS, THE FOOD YOU BOUGHT JUST DIDN'T LAST AND YOU DIDN'T HAVE MONEY TO GET MORE.: NEVER TRUE

## 2021-08-11 SDOH — ECONOMIC STABILITY: INCOME INSECURITY: IN THE LAST 12 MONTHS, WAS THERE A TIME WHEN YOU WERE NOT ABLE TO PAY THE MORTGAGE OR RENT ON TIME?: NO

## 2021-08-11 SDOH — ECONOMIC STABILITY: FOOD INSECURITY: WITHIN THE PAST 12 MONTHS, YOU WORRIED THAT YOUR FOOD WOULD RUN OUT BEFORE YOU GOT MONEY TO BUY MORE.: NEVER TRUE

## 2021-08-11 SDOH — ECONOMIC STABILITY: HOUSING INSECURITY
IN THE LAST 12 MONTHS, WAS THERE A TIME WHEN YOU DID NOT HAVE A STEADY PLACE TO SLEEP OR SLEPT IN A SHELTER (INCLUDING NOW)?: NO

## 2021-08-11 SDOH — ECONOMIC STABILITY: TRANSPORTATION INSECURITY
IN THE PAST 12 MONTHS, HAS LACK OF TRANSPORTATION KEPT YOU FROM MEETINGS, WORK, OR FROM GETTING THINGS NEEDED FOR DAILY LIVING?: NO

## 2021-08-11 ASSESSMENT — SOCIAL DETERMINANTS OF HEALTH (SDOH): HOW HARD IS IT FOR YOU TO PAY FOR THE VERY BASICS LIKE FOOD, HOUSING, MEDICAL CARE, AND HEATING?: NOT HARD AT ALL

## 2021-08-11 NOTE — PROGRESS NOTES
Visit Information    Have you changed or started any medications since your last visit including any over-the-counter medicines, vitamins, or herbal medicines? no   Are you having any side effects from any of your medications? -  no  Have you stopped taking any of your medications? Is so, why? -  no    Have you seen any other physician or provider since your last visit? No  Have you had any other diagnostic tests since your last visit? Yes - Records Requested  Have you been seen in the emergency room and/or had an admission to a hospital since we last saw you? Yes - Records Obtained  Have you had your routine dental cleaning in the past 6 months? no    Have you activated your [employer] account? If not, what are your barriers?  Yes     Patient Care Team:  Sid Rosales MD as PCP - General (Family Medicine)  Sid Rosales MD as PCP - [facility]  Fox Maurer MD as Consulting Physician (Obstetrics & Gynecology)  Nav Cordero MD as Consulting Physician (Psychiatry)  Mague Sheppard MD as Consulting Physician (Neurology)    Medical History Review  Past Medical, Family, and Social History reviewed and does contribute to the patient presenting condition    Health Maintenance   Topic Date Due    Hepatitis A vaccine (1 of 2 - Risk 2-dose series) Never done    Varicella vaccine (1 of 2 - 2-dose childhood series) Never done    Pneumococcal 0-64 years Vaccine (1 of 2 - PPSV23) Never done    COVID-19 Vaccine (1) Never done    Hepatitis B vaccine (1 of 3 - Risk 3-dose series) Never done    Flu vaccine (1) 09/01/2021    Cervical cancer screen  10/23/2022    DTaP/Tdap/Td vaccine (5 - Td or Tdap) 03/23/2027    HIV screen  Completed    Hib vaccine  Aged Out    Meningococcal (ACWY) vaccine  Aged Out

## 2021-08-12 ENCOUNTER — TELEMEDICINE (OUTPATIENT)
Dept: FAMILY MEDICINE CLINIC | Age: 31
End: 2021-08-12
Payer: MEDICARE

## 2021-08-12 DIAGNOSIS — F19.11 HISTORY OF SUBSTANCE ABUSE (HCC): ICD-10-CM

## 2021-08-12 DIAGNOSIS — F11.988 OPIOID USE, UNSPECIFIED WITH OTHER OPIOID-INDUCED DISORDER (HCC): ICD-10-CM

## 2021-08-12 DIAGNOSIS — K12.2 SUBLINGUAL ABSCESS: Primary | ICD-10-CM

## 2021-08-12 DIAGNOSIS — F31.30 BIPOLAR I DISORDER, MOST RECENT EPISODE DEPRESSED (HCC): ICD-10-CM

## 2021-08-12 PROCEDURE — 99214 OFFICE O/P EST MOD 30 MIN: CPT | Performed by: FAMILY MEDICINE

## 2021-08-12 PROCEDURE — 1111F DSCHRG MED/CURRENT MED MERGE: CPT | Performed by: FAMILY MEDICINE

## 2021-08-12 RX ORDER — DOXYCYCLINE HYCLATE 100 MG
100 TABLET ORAL 2 TIMES DAILY
Qty: 14 TABLET | Refills: 0 | Status: SHIPPED | OUTPATIENT
Start: 2021-08-12 | End: 2021-08-19

## 2021-08-12 RX ORDER — PREDNISONE 10 MG/1
TABLET ORAL
Qty: 30 TABLET | Refills: 0 | Status: SHIPPED | OUTPATIENT
Start: 2021-08-12 | End: 2021-09-01

## 2021-08-12 RX ORDER — CHLORHEXIDINE GLUCONATE 4 G/100ML
SOLUTION TOPICAL
Qty: 1 BOTTLE | Refills: 1 | Status: SHIPPED | OUTPATIENT
Start: 2021-08-12 | End: 2021-08-27 | Stop reason: SDUPTHER

## 2021-08-12 ASSESSMENT — PATIENT HEALTH QUESTIONNAIRE - PHQ9
SUM OF ALL RESPONSES TO PHQ QUESTIONS 1-9: 1
SUM OF ALL RESPONSES TO PHQ QUESTIONS 1-9: 1
1. LITTLE INTEREST OR PLEASURE IN DOING THINGS: 0
9. THOUGHTS THAT YOU WOULD BE BETTER OFF DEAD, OR OF HURTING YOURSELF: 0
SUM OF ALL RESPONSES TO PHQ QUESTIONS 1-9: 1
SUM OF ALL RESPONSES TO PHQ9 QUESTIONS 1 & 2: 1
2. FEELING DOWN, DEPRESSED OR HOPELESS: 1

## 2021-08-12 ASSESSMENT — ENCOUNTER SYMPTOMS
COLOR CHANGE: 1
WHEEZING: 0
ABDOMINAL PAIN: 0
RESPIRATORY NEGATIVE: 1
SHORTNESS OF BREATH: 0
SORE THROAT: 1
FACIAL SWELLING: 1

## 2021-08-12 NOTE — PATIENT INSTRUCTIONS
dosing syringe provided, or with a special dose-measuring spoon or medicine cup. If you do not have a dose-measuring device, ask your pharmacist for one. If you take doxycycline to prevent malaria: Start taking the medicine 1 or 2 days before entering an area where malaria is common. Continue taking the medicine every day during your stay and for at least 4 weeks after you leave the area. Doxycycline is usually given by injection only if you are unable to take the medicine by mouth. A healthcare provider will give you this injection as an infusion into a vein. Use this medicine for the full prescribed length of time, even if your symptoms quickly improve. Skipping doses can increase your risk of infection that is resistant to medication. Doxycycline will not treat a viral infection such as the flu or a common cold. Store at room temperature away from moisture, heat, and light. Throw away any unused medicine after the expiration date on the label has passed. Using  doxycycline can cause damage to your kidneys. What happens if I miss a dose? Take the medicine as soon as you can, but skip the missed dose if it is almost time for your next dose. Do not take two doses at one time. What happens if I overdose? Seek emergency medical attention or call the Poison Help line at 1-267.394.7953. What should I avoid while taking doxycycline? Do not take iron supplements, multivitamins, calcium supplements, antacids, or laxatives within 2 hours before or after taking doxycycline. Avoid taking any other antibiotics with doxycycline unless your doctor has told you to. Doxycycline could make you sunburn more easily. Avoid sunlight or tanning beds. Wear protective clothing and use sunscreen (SPF 30 or higher) when you are outdoors. Antibiotic medicines can cause diarrhea, which may be a sign of a new infection. If you have diarrhea that is watery or bloody, call your doctor.  Do not use anti-diarrhea medicine unless your doctor tells you to. What are the possible side effects of doxycycline? Get emergency medical help if you have signs of an allergic reaction (hives, difficult breathing, swelling in your face or throat) or a severe skin reaction (fever, sore throat, burning in your eyes, skin pain, red or purple skin rash that spreads and causes blistering and peeling). Seek medical treatment if you have a serious drug reaction that can affect many parts of your body. Symptoms may include: skin rash, fever, swollen glands, flu-like symptoms, muscle aches, severe weakness, unusual bruising, or yellowing of your skin or eyes. This reaction may occur several weeks after you began using doxycycline. Call your doctor at once if you have:  · severe stomach pain, diarrhea that is watery or bloody;  · throat irritation, trouble swallowing;  · chest pain, irregular heart rhythm, feeling short of breath;  · little or no urination;  · low white blood cell counts --fever, chills, swollen glands, body aches, weakness, pale skin, easy bruising or bleeding;  · increased pressure inside the skull --severe headaches, ringing in your ears, dizziness, nausea, vision problems, pain behind your eyes; or  · signs of liver or pancreas problems --loss of appetite, upper stomach pain (that may spread to your back), tiredness, nausea or vomiting, fast heart rate, dark urine, jaundice (yellowing of the skin or eyes). Common side effects may include:  · nausea, vomiting, upset stomach, loss of appetite;  · mild diarrhea;  · skin rash or itching;  · darkened skin color; or  · vaginal itching or discharge. This is not a complete list of side effects and others may occur. Call your doctor for medical advice about side effects. You may report side effects to FDA at 1-108-FDA-1266. What other drugs will affect doxycycline? Sometimes it is not safe to use certain medications at the same time.  Some drugs can affect your blood levels of other drugs you take, which may increase side effects or make the medications less effective. Other drugs may affect doxycycline, including prescription and over-the-counter medicines, vitamins, and herbal products. Tell your doctor about all your current medicines and any medicine you start or stop using. Where can I get more information? Your pharmacist can provide more information about doxycycline. Remember, keep this and all other medicines out of the reach of children, never share your medicines with others, and use this medication only for the indication prescribed. Every effort has been made to ensure that the information provided by [address] is accurate, up-to-date, and complete, but no guarantee is made to that effect. Drug information contained herein may be time sensitive. [facility] information has been compiled for use by healthcare practitioners and consumers in the [place] and therefore [facility] does not warrant that uses outside of the [place] are appropriate, unless specifically indicated otherwise. [facility]'s drug information does not endorse drugs, diagnose patients or recommend therapy. [facility] information is an informational resource designed to assist licensed healthcare practitioners in caring for their patients and/or to serve consumers viewing this service as a supplement to, and not a substitute for, the expertise, skill, knowledge and judgment of healthcare practitioners. The absence of a warning for a given drug or drug combination in no way should be construed to indicate that the drug or drug combination is safe, effective or appropriate for any given patient. [facility] does not assume any responsibility for any aspect of healthcare administered with the aid of information [facility] provides. The information contained herein is not intended to cover all possible uses, directions, precautions, warnings, drug interactions, allergic reactions, or adverse effects.  If you have questions about the drugs you are taking, check with your doctor, nurse or pharmacist.  Copyright 5636-0944 [address]: 21.04. Revision date: 11/4/2020. Care instructions adapted under license by [facility] ([facility]). If you have questions about a medical condition or this instruction, always ask your healthcare professional. [place] [address] any warranty or liability for your use of this information.

## 2021-08-12 NOTE — PROGRESS NOTES
[facility]  [facility]  [address]  [address] [facility] [address]  Dept: [address] [place] [address] Follow Up      Elías Garsia   YOB: 1990    Date of Office Visit:  8/12/2021  Date of Hospital Admission: 8/4/21  Date of Hospital Discharge: 8/5/21  Readmission Risk Score(high >=14%.  Medium >=10%):Readmission Risk Score: 11      Care management risk score Rising risk (score 2-5) and Complex Care (Scores >=6): 5     Non face to face  following discharge, date last encounter closed (first attempt may have been earlier): *No documented post hospital discharge outreach found in the last 14 days *No documented post hospital discharge outreach found in the last 14 days    Call initiated 2 business days of discharge: *No response recorded in the last 14 days     Patient Active Problem List   Diagnosis    Viral hepatitis C    Moderate persistent asthma without complication    Thrombocytopenia    H/O CS x 2    Lost custody of children    Bipolar I disorder, most recent episode depressed ([address]    Herpes simplex labialis    Chronic fatigue    LUE weakness    Numbness and tingling    History of substance abuse ([address]    Hearing loss of right ear    History of cardiac arrest    Drug-induced constipation    Encounter for monitoring Suboxone maintenance therapy    History of seizures    Allergic rhinitis    Sublingual abscess    Acne    Cocaine dependence ([address]    Seizure ([address]    Opioid use, unspecified with other opioid-induced disorder (HCC)       Allergies   Allergen Reactions    Penicillins Hives and Swelling     States throat swells    Buspirone      Other reaction(s): prev tried (mild)         Medications listed as ordered at the time of discharge from [facility]   [address] Medication Instructions LUISA:    Printed on:08/12/21 2889   Medication Information albuterol sulfate HFA (PROVENTIL HFA) 108 (90 Base) MCG/ACT inhaler  Inhale 2 puffs into the lungs every 6 hours as needed for Wheezing or Shortness of Breath             chlorhexidine (HIBICLENS) 4 % external liquid  Apply topically daily as needed. chlorhexidine (PERIDEX) 0.12 % solution  Take 15 mLs by mouth 2 times daily for 14 days             docusate sodium (COLACE) 100 MG capsule  Take 1 capsule by mouth 2 times daily as needed for Constipation             doxycycline hyclate (VIBRA-TABS) 100 MG tablet  Take 1 tablet by mouth 2 times daily for 7 days             fluticasone (FLONASE) 50 MCG/ACT nasal spray  2 sprays by Nasal route daily             gabapentin (NEURONTIN) 300 MG capsule  Take 1 capsule by mouth 3 times daily for 30 days. hydrOXYzine (ATARAX) 25 MG tablet  Take 1 tablet by mouth 3 times daily as needed for Anxiety             Mometasone Furo-Formoterol Fum 50-5 MCG/ACT AERO  Inhale 2 puffs into the lungs 2 times daily             predniSONE (DELTASONE) 10 MG tablet  Take 4 tabs X 3 days, then 3 tabs X 3 days, then 2 tabs X 3 days, then 1 tab X 3 days             SUBOXONE 8-2 MG FILM SL film  PLACE 1 STRIP UNDER THE TONGUE TWICE DAILY             traZODone (DESYREL) 50 MG tablet  Take 1 tablet by mouth nightly as needed for Sleep             vilazodone HCl (VILAZODONE HCL) 10 MG TABS  Take 1 tablet by mouth daily                   Medications marked \"taking\" at this time  Outpatient Medications Marked as Taking for the 8/12/21 encounter (Telemedicine) with TOMY Angela CNP   Medication Sig Dispense Refill    predniSONE (DELTASONE) 10 MG tablet Take 4 tabs X 3 days, then 3 tabs X 3 days, then 2 tabs X 3 days, then 1 tab X 3 days 30 tablet 0    doxycycline hyclate (VIBRA-TABS) 100 MG tablet Take 1 tablet by mouth 2 times daily for 7 days 14 tablet 0    chlorhexidine (HIBICLENS) 4 % external liquid Apply topically daily as needed.  1 Bottle 1    DISORDER DEPRESSION AND ANXIETY  Jadiel Green reported some ongoing issues with depression and anxiety. Symptoms includes difficulty concentrating, feelings of losing control, psychomotor agitation and anhedonia. Current therapy includes vilazodone, trazodone, which is working well for her. she denies adverse reaction to current therapy. she also denies suicidal/homicidal ideation, plan or intent. Patient being seen by [facility]. PHQ-2 Over the past 2 weeks, how often have you been bothered by any of the following problems? Little interest or pleasure in doing things: Not at all  Feeling down, depressed, or hopeless: Several days  PHQ-2 Score: 1  PHQ-9 Over the past 2 weeks, how often have you been bothered by any of the following problems? Thoughts that you would be better off dead, or of hurting yourself in some way: Not at all  PHQ-9 Total Score: 1   No flowsheet data found. FINDINGS:   PHARYNX/LARYNX:  The palatine tonsils are normal in appearance. The tongue   is normal in appearance. The valleculae, epiglottis, aryepiglottic folds and   pyriform sinuses appear unremarkable. The true and false vocal cords are   normal in appearance. There is a 1.5 cm loculated collection along floor of   mouth. Anteriorly it appears to be displacing lingual thyroid glands   posteriorly and medially. This measures 1.7 x 0.8 x 1.5 cm. This may be   rising from the apical lucency involving the right 1st molar however no   definite cortical breakthrough identified. SALIVARY GLANDS/THYROID:  The parotid and submandibular glands appear   unremarkable. The thyroid gland appears unremarkable. LYMPH NODES:  Mildly prominent submental lymph nodes likely reactive. SOFT TISSUES:  No additional soft tissue swelling or mass is seen. BRAIN/ORBITS/SINUSES:  The visualized portion of the intracranial contents   appear unremarkable.   The visualized portion of the orbits, paranasal sinuses   and ideation. Assessment/Plan:  1. Sublingual abscess  Worsening  Advised to Keep site clean and dry. DISCUSSED AND ADVISED TO:  Apply antibiotic ointment sparingly to site for the next few days. Keep open to air as much as possible. Apply dressing if needed. No pool, lakes, hot tubs until fully healed. Call for worsening redness, streaking, swelling, drainage, pain, and fever/chills. - Landry Escobar MD, Dermatology, [place]  - predniSONE (DELTASONE) 10 MG tablet; Take 4 tabs X 3 days, then 3 tabs X 3 days, then 2 tabs X 3 days, then 1 tab X 3 days  Dispense: 30 tablet; Refill: 0  - doxycycline hyclate (VIBRA-TABS) 100 MG tablet; Take 1 tablet by mouth 2 times daily for 7 days  Dispense: 14 tablet; Refill: 0  - chlorhexidine (HIBICLENS) 4 % external liquid; Apply topically daily as needed. Dispense: 1 Bottle; Refill: 1    2. Bipolar I disorder, most recent episode depressed ([facility]  Stable  Continue current therapy. DISCUSSED and ADVISED TO:  Not stopping medication suddenly. See the specialist as discussed. Report for feelings of SI, HI, and hallucinations. Go to the ER for increasing urge to hurt yourself. - CT DISCHARGE MEDS RECONCILED W/ CURRENT OUTPATIENT MED LIST    3. Opioid use, unspecified with other opioid-induced disorder ([facility]  Stable  On suboxone  No opiates given      4. History of substance abuse ([facility]  historical  - CT DISCHARGE MEDS RECONCILED W/ CURRENT OUTPATIENT MED LIST      Controlled Substance Monitoring:    Acute and Chronic Pain Monitoring:   RX Monitoring 8/12/2021   Periodic Controlled Substance Monitoring No signs of potential drug abuse or diversion identified. Medical Decision Making: moderate complexity    This note was completed by using the assistance of a speech-recognition program. However, inadvertent computerized transcription errors may be present.  Although every effort was made to ensure accuracy, no guarantees can be provided that every mistake has been identified and corrected by editing.   Electronically signed by TOMY Ashraf CNP on 8/12/21 at 12:38 PM EDT

## 2021-08-18 DIAGNOSIS — K12.2 SUBLINGUAL ABSCESS: ICD-10-CM

## 2021-08-18 RX ORDER — PREDNISONE 10 MG/1
TABLET ORAL
Qty: 30 TABLET | Refills: 0 | OUTPATIENT
Start: 2021-08-18

## 2021-08-18 RX ORDER — CHLORHEXIDINE GLUCONATE 4 G/100ML
SOLUTION TOPICAL
Qty: 1 BOTTLE | Refills: 1 | OUTPATIENT
Start: 2021-08-18

## 2021-08-18 RX ORDER — DOXYCYCLINE HYCLATE 100 MG
100 TABLET ORAL 2 TIMES DAILY
Qty: 14 TABLET | Refills: 0 | OUTPATIENT
Start: 2021-08-18 | End: 2021-08-25

## 2021-08-18 NOTE — TELEPHONE ENCOUNTER
Yenny Chavez from Dr. Rosemary Carias office called stating that she spoke with patient and made it seem like she needed to schedule an appointment urgently due to abscess. She states Dr. Fabian Petersen is scheduling out until after the first of the year. She states usually if it is urgent our office calls or sends over the referral stating it is an urgent need. She states she is just following up with us to let us know what is going on.

## 2021-08-18 NOTE — TELEPHONE ENCOUNTER
Please Approve or Refuse. Send to Pharmacy per Pt's Request:      Patient states she saw Chiquita Rdz recently, was prescribed Prednisone, and given a referral for a dermatologist. She states she is not able to get in with the dermatologist until next year because it is not urgent. She states the medication Renella prescribed is helping and was told to call back and get it refilled if it was working. Please advise.       Next Visit Date:  10/21/2021   Last Visit Date: 8/12/2021    No results found for: LABA1C          ( goal A1C is < 7)   BP Readings from Last 3 Encounters:   08/05/21 112/82   07/28/21 124/84   06/30/21 97/63          (goal 120/80)  BUN   Date Value Ref Range Status   08/04/2021 17 6 - 20 mg/dL Final     CREATININE   Date Value Ref Range Status   08/04/2021 0.93 (H) 0.50 - 0.90 mg/dL Final     Potassium   Date Value Ref Range Status   08/04/2021 3.8 3.7 - 5.3 mmol/L Final

## 2021-08-27 DIAGNOSIS — K12.2 SUBLINGUAL ABSCESS: ICD-10-CM

## 2021-08-27 RX ORDER — CHLORHEXIDINE GLUCONATE 4 G/100ML
SOLUTION TOPICAL
Qty: 1 BOTTLE | Refills: 1 | Status: SHIPPED | OUTPATIENT
Start: 2021-08-27 | End: 2022-07-26 | Stop reason: ALTCHOICE

## 2021-08-27 RX ORDER — PREDNISONE 10 MG/1
TABLET ORAL
Qty: 30 TABLET | Refills: 0 | OUTPATIENT
Start: 2021-08-27

## 2021-08-27 NOTE — TELEPHONE ENCOUNTER
LM for patient to call the office to schedule a virtual appointment at the end of the day today. Will have to schedule with another provider if she does not call back today.

## 2021-08-27 NOTE — TELEPHONE ENCOUNTER
Please Approve or Refuse.   Send to Pharmacy per Pt's Request:     Next Visit Date:  10/21/2021   Last Visit Date: 8/12/2021    No results found for: LABA1C          ( goal A1C is < 7)   BP Readings from Last 3 Encounters:   08/05/21 112/82   07/28/21 124/84   06/30/21 97/63          (goal 120/80)  BUN   Date Value Ref Range Status   08/04/2021 17 6 - 20 mg/dL Final     CREATININE   Date Value Ref Range Status   08/04/2021 0.93 (H) 0.50 - 0.90 mg/dL Final     Potassium   Date Value Ref Range Status   08/04/2021 3.8 3.7 - 5.3 mmol/L Final

## 2021-09-30 ENCOUNTER — CLINICAL DOCUMENTATION (OUTPATIENT)
Dept: FAMILY MEDICINE CLINIC | Age: 31
End: 2021-09-30

## 2022-04-21 ENCOUNTER — TELEPHONE (OUTPATIENT)
Dept: FAMILY MEDICINE CLINIC | Age: 32
End: 2022-04-21

## 2022-04-21 NOTE — TELEPHONE ENCOUNTER
No-show today, patient needs to reschedule    [address] Via Text    No text has been sent  Last text sent04/21/2022 9:30 AM  To:[phone]  Email      Send Link Via Email    No email has been sent  Last email sent04/21/2022 9:30 AM  To:[email or web address]      [address] Phone      Send Link Via Text    No text has been sent  Last text sent04/21/2022 9:41 AM  To:[phone]  Email      Send Link Via Email    No email has been sent  Last email sent04/21/2022 9:41 AM  To:[email or web address]      I left the patient a voice message to click on the link sent in her text messages, if unable to do so in the next 5 minutes she needs to reschedule, phone number provided to reschedule [phone]

## 2022-07-13 ENCOUNTER — HOSPITAL ENCOUNTER (OUTPATIENT)
Age: 32
Setting detail: SPECIMEN
Discharge: HOME | End: 2022-07-13

## 2022-07-13 ENCOUNTER — OFFICE VISIT (OUTPATIENT)
Dept: OBGYN CLINIC | Age: 32
End: 2022-07-13
Payer: MEDICARE

## 2022-07-13 VITALS
SYSTOLIC BLOOD PRESSURE: 118 MMHG | WEIGHT: 183 LBS | BODY MASS INDEX: 31.24 KG/M2 | DIASTOLIC BLOOD PRESSURE: 78 MMHG | HEART RATE: 94 BPM | HEIGHT: 64 IN

## 2022-07-13 DIAGNOSIS — N76.0 ACUTE VAGINITIS: ICD-10-CM

## 2022-07-13 DIAGNOSIS — K64.9 HEMORRHOIDS, UNSPECIFIED HEMORRHOID TYPE: ICD-10-CM

## 2022-07-13 DIAGNOSIS — Z01.419 WELL WOMAN EXAM: ICD-10-CM

## 2022-07-13 DIAGNOSIS — Z32.02 NEGATIVE PREGNANCY TEST: ICD-10-CM

## 2022-07-13 DIAGNOSIS — Z87.42 HISTORY OF MENORRHAGIA: ICD-10-CM

## 2022-07-13 DIAGNOSIS — N94.6 DYSMENORRHEA: ICD-10-CM

## 2022-07-13 DIAGNOSIS — N92.6 IRREGULAR BLEEDING: ICD-10-CM

## 2022-07-13 DIAGNOSIS — Z98.890 HISTORY OF LOOP ELECTRICAL EXCISION PROCEDURE (LEEP): ICD-10-CM

## 2022-07-13 DIAGNOSIS — Z01.419 WELL WOMAN EXAM: Primary | ICD-10-CM

## 2022-07-13 LAB
CONTROL: NORMAL
PREGNANCY TEST URINE, POC: NEGATIVE

## 2022-07-13 PROCEDURE — 99395 PREV VISIT EST AGE 18-39: CPT | Performed by: SPECIALIST

## 2022-07-13 PROCEDURE — 96372 THER/PROPH/DIAG INJ SC/IM: CPT | Performed by: SPECIALIST

## 2022-07-13 PROCEDURE — 81025 URINE PREGNANCY TEST: CPT | Performed by: SPECIALIST

## 2022-07-13 RX ORDER — IBUPROFEN 800 MG/1
TABLET ORAL
COMMUNITY
Start: 2022-07-12

## 2022-07-13 RX ORDER — MIRTAZAPINE 15 MG/1
TABLET, FILM COATED ORAL
COMMUNITY
Start: 2022-06-28

## 2022-07-13 RX ORDER — CARIPRAZINE 1.5 MG/1
CAPSULE, GELATIN COATED ORAL
COMMUNITY
Start: 2022-06-28

## 2022-07-13 RX ORDER — MEDROXYPROGESTERONE ACETATE 150 MG/ML
150 INJECTION, SUSPENSION INTRAMUSCULAR ONCE
Status: COMPLETED | OUTPATIENT
Start: 2022-07-13 | End: 2022-07-13

## 2022-07-13 RX ORDER — HYDROCORTISONE ACETATE 25 MG/1
25 SUPPOSITORY RECTAL EVERY 12 HOURS
Qty: 25 SUPPOSITORY | Refills: 0 | Status: SHIPPED | OUTPATIENT
Start: 2022-07-13 | End: 2022-07-26 | Stop reason: ALTCHOICE

## 2022-07-13 RX ORDER — FLUCONAZOLE 100 MG/1
100 TABLET ORAL DAILY
Qty: 7 TABLET | Refills: 0 | Status: SHIPPED | OUTPATIENT
Start: 2022-07-13 | End: 2022-07-20

## 2022-07-13 RX ORDER — METRONIDAZOLE 500 MG/1
500 TABLET ORAL 2 TIMES DAILY
Qty: 14 TABLET | Refills: 0 | Status: SHIPPED | OUTPATIENT
Start: 2022-07-13 | End: 2022-07-20

## 2022-07-13 RX ADMIN — MEDROXYPROGESTERONE ACETATE 150 MG: 150 INJECTION, SUSPENSION INTRAMUSCULAR at 16:46

## 2022-07-13 ASSESSMENT — ENCOUNTER SYMPTOMS
VOMITING: 0
COUGH: 0
ABDOMINAL PAIN: 0
NAUSEA: 0
CONSTIPATION: 0
APNEA: 0
ABDOMINAL DISTENTION: 0
DIARRHEA: 0
EYE PAIN: 0

## 2022-07-13 NOTE — PROGRESS NOTES
Subjective:      Patient ID: Elberta Phoenix is a 28 y.o. female. Chief Complaint   Patient presents with    Annual Exam     /78   Pulse 94   Ht 5' 4\" (1.626 m)   Wt 183 lb (83 kg)   LMP  (LMP Unknown)   BMI 31.41 kg/m²   No LMP recorded (lmp unknown). Patient has had an injection. [id number]    Past Medical History:   Diagnosis Date    Abnormal Pap smear     Acute kidney failure (HCC)     Anemia     Anxiety     Asthma     Bipolar 1 disorder (HCC)     Cardiac arrest ([address] [address]     Cellulitis 2019    Depression     Dialysis patient ([facility] [address] [facility]     Pt taken off approx 10/1/2019    Drug abuse ([address] [address]     gets opiates from [address] OPI 80s per day    H/O 29 wk indicated  delivery 2015    D/t fetal bradycardia at [facility] office, [facility] for STAT CS     Hepatitis C     History of substance abuse ([facility] 2020    Multiple, including opiates    HRP (high risk pregnancy) 2017    Kidney infection     Methadone maintenance 2014    6/18/15 Dose of 88mg confirmed with [facility]. - DISCHARGED from [facility] approximately 17  [facility] backline- [phone] Replacing Inactive Diagnoses  [phone]     Neurologic disorder     Opioid dependence with withdrawal ([facility] [address] 2018    Polysubstance abuse ([facility] [address]     Drug abuse includes Fentanyl, opoiates, benzos/Xanax, cocaine; pt is currently prescribed Suboxone    Polysubstance dependence ([facility] [address] [facility] 2018    Opioid, cocaine, cannabis    Postpartum depression     RLTCS 17 M APG 8/9 Wt 7#12 2017    Seizures ([facility] [address]     only when detoxing    Suicidal ideation     Ulceration     to right foot     Current Outpatient Medications Ordered in Epic   Medication Sig Dispense Refill    mirtazapine (REMERON) 15 MG tablet       metroNIDAZOLE (FLAGYL) 500 MG tablet Take 1 tablet by mouth 2 times daily for 7 days 14 tablet 0    fluconazole (DIFLUCAN) 100 MG tablet Take 1 tablet by mouth daily for 7 days 7 tablet 0    hydrocortisone (ANUSOL-HC) 25 MG suppository Place 1 suppository rectally every 12 hours 25 suppository 0    minocycline (MINOCIN;DYNACIN) 100 MG capsule Take 1 capsule by mouth 2 times daily 14 capsule 0    clindamycin-benzoyl peroxide (BENZACLIN) 1-5 % gel Apply topically 2 times daily. 120 g 1    chlorhexidine (HIBICLENS) 4 % external liquid Apply topically daily as needed. 1 Bottle 1    albuterol sulfate HFA (PROVENTIL HFA) 108 (90 Base) MCG/ACT inhaler Inhale 2 puffs into the lungs every 6 hours as needed for Wheezing or Shortness of Breath 1 Inhaler 1    Mometasone Furo-Formoterol Fum 50-5 MCG/ACT AERO Inhale 2 puffs into the lungs 2 times daily 13 g 3    SUBOXONE 8-2 MG FILM SL film PLACE 1 STRIP UNDER THE TONGUE TWICE DAILY      hydrOXYzine (ATARAX) 25 MG tablet Take 1 tablet by mouth 3 times daily as needed for Anxiety 90 tablet 0    VRAYLAR 1.5 MG capsule       ibuprofen (ADVIL;MOTRIN) 800 MG tablet       tretinoin (RETIN-A) 0.01 % gel Apply topically nightly. 120 g 1    gabapentin (NEURONTIN) 300 MG capsule Take 1 capsule by mouth 3 times daily for 30 days.  (Patient not taking: Reported on 7/13/2022) 90 capsule 0    vilazodone HCl (VILAZODONE HCL) 10 MG TABS Take 1 tablet by mouth daily 30 tablet 0     Current Facility-Administered Medications Ordered in Epic   Medication Dose Route Frequency Provider Last Rate Last Admin    medroxyPROGESTERone (DEPO-PROVERA) injection 150 mg  150 mg IntraMUSCular Once Danny Hamilton MD         Problem List Items Addressed This Visit     Well woman exam - Primary    Relevant Orders    PAP SMEAR    POCT urine pregnancy    Chlamydia Trachomatis & Neisseria gonorrhoeae (GC) by amplified detection    Vaginitis DNA Probe    Hemorrhoids    Relevant Orders    POCT urine pregnancy    Chlamydia Trachomatis & Neisseria gonorrhoeae (GC) by amplified detection    Vaginitis DNA Probe    Irregular bleeding    Relevant Medications    medroxyPROGESTERone (DEPO-PROVERA) injection 150 mg (Start on 2022  4:00 PM)    Other Relevant Orders    POCT urine pregnancy    Chlamydia Trachomatis & Neisseria gonorrhoeae (GC) by amplified detection    Vaginitis DNA Probe    Dysmenorrhea    Relevant Medications    medroxyPROGESTERone (DEPO-PROVERA) injection 150 mg (Start on 2022  4:00 PM)    Other Relevant Orders    POCT urine pregnancy    Chlamydia Trachomatis & Neisseria gonorrhoeae (GC) by amplified detection    Vaginitis DNA Probe    Negative pregnancy test    Relevant Orders    POCT urine pregnancy    Chlamydia Trachomatis & Neisseria gonorrhoeae (GC) by amplified detection    Vaginitis DNA Probe    Acute vaginitis    Relevant Orders    POCT urine pregnancy    Chlamydia Trachomatis & Neisseria gonorrhoeae (GC) by amplified detection    Vaginitis DNA Probe    History of loop electrical excision procedure (LEEP)    Relevant Orders    POCT urine pregnancy    Chlamydia Trachomatis & Neisseria gonorrhoeae (GC) by amplified detection    Vaginitis DNA Probe    History of menorrhagia    Relevant Medications    medroxyPROGESTERone (DEPO-PROVERA) injection 150 mg (Start on 2022  4:00 PM)    Other Relevant Orders    POCT urine pregnancy    Chlamydia Trachomatis & Neisseria gonorrhoeae (GC) by amplified detection    Vaginitis DNA Probe        Allergies   Allergen Reactions    Penicillins Hives and Swelling     States throat swells    Buspirone      Other reaction(s): prev tried (mild)       Orders Placed This Encounter   Procedures    Chlamydia Trachomatis & Neisseria gonorrhoeae (GC) by amplified detection     Standing Status:   Future     Standing Expiration Date:   2023    Vaginitis DNA Probe     Standing Status:   Future     Standing Expiration Date:   2022    PAP SMEAR     Patient History:    No LMP recorded. Patient has had an injection.   OBGYN Status: Injection  Past Surgical History:  No date: ABDOMINAL SURGERY  2017:  SECTION; N/A      Comment:  SECTION performed by Omi Carrasco MD at                [facility] L&D OR  2008: LEEP  10-21-14: [place]  DELIVERY ONLY      Comment:  , Low Cervical      Social History    Tobacco Use      Smoking status: Never Smoker      Smokeless tobacco: Never Used       Standing Status:   Future     Standing Expiration Date:   2023     Order Specific Question:   Collection Type     Answer: Thin Prep     Order Specific Question:   Prior Abnormal Pap Test     Answer:   No     Order Specific Question:   Screening or Diagnostic     Answer:   Screening     Order Specific Question:   HPV Requested? Answer:   Yes     Order Specific Question:   High Risk Patient     Answer:   N/A    POCT urine pregnancy     HPI  Patient is here for an annual exam today. Her last pap smear was done 10/23/2019 and was negative for intraepithelial lesion or malignancy. She had a LEEP done in 2018. Patient wants to get back on Depo Provera. She has had 2 periods in the last 2 months, but she did not have any periods for a long time before then. She has something that feels like extra skin and hurts when she goes to the bathroom. Patient wants to have cultures done today. She is working as a  for the last 3 years on and off. Review of Systems   Constitutional: Negative for activity change, appetite change and fever. HENT: Negative for ear discharge and ear pain. Eyes: Negative for pain and visual disturbance. Respiratory: Negative for apnea and cough. Cardiovascular: Negative for chest pain, palpitations and leg swelling. Gastrointestinal: Negative for abdominal distention, abdominal pain, constipation, diarrhea, nausea and vomiting. Endocrine: Negative. Genitourinary: Positive for menstrual problem. Negative for difficulty urinating, dysuria and pelvic pain. Musculoskeletal: Negative for neck pain and neck stiffness. Skin: Negative.     Neurological: Negative for light-headedness and numbness. Hematological: Negative. Does not bruise/bleed easily. Objective:   Physical Exam  Vitals and nursing note reviewed. Exam conducted with a chaperone present. Constitutional:       Appearance: She is well-developed. HENT:      Head: Normocephalic and atraumatic. Neck:      Thyroid: No thyroid mass or thyromegaly. Cardiovascular:      Rate and Rhythm: Normal rate and regular rhythm. Pulmonary:      Effort: Pulmonary effort is normal.      Breath sounds: Normal breath sounds. Chest:   Breasts:      Right: Normal. No inverted nipple, mass, nipple discharge, skin change or tenderness. Left: Normal. No inverted nipple, mass, nipple discharge, skin change or tenderness. Abdominal:      General: Bowel sounds are normal. There is no distension. Palpations: Abdomen is soft. There is no mass. Tenderness: There is no abdominal tenderness. There is no guarding or rebound. Hernia: There is no hernia in the left inguinal area. Genitourinary:     General: Normal vulva. Exam position: Supine. Labia:         Right: No rash or lesion. Left: No rash or lesion. Vagina: Normal. No signs of injury. No vaginal discharge or tenderness. Cervix: Discharge present. No cervical motion tenderness. Uterus: Normal. Not enlarged, not fixed and not tender. Adnexa: Right adnexa normal and left adnexa normal.        Right: No mass or tenderness. Left: No mass or tenderness. Rectum: External hemorrhoid present. No mass or anal fissure. Normal anal tone. Musculoskeletal:         General: No tenderness. Normal range of motion. Skin:     General: Skin is warm and dry. Neurological:      Mental Status: She is alert and oriented to person, place, and time.    Psychiatric:         Judgment: Judgment normal.         Assessment:      Patient with vaginitis and hemorrhoids, otherwise normal annual exam.  Pap smear and cultures were done. Will treat hemorrhoids with Anusol. Will treat vaginitis with Flagyl and Diflucan. Patient was told that if she needs different medication when the result of cultures return, the office will contact her. Patient with irregular bleeding. A pregnancy test today is negative. Will treat with Depo Provera. Plan:      Orders Placed This Encounter   Procedures    Chlamydia Trachomatis & Neisseria gonorrhoeae (GC) by amplified detection    Vaginitis DNA Probe    PAP SMEAR    POCT urine pregnancy     Orders Placed This Encounter   Medications    metroNIDAZOLE (FLAGYL) 500 MG tablet     Sig: Take 1 tablet by mouth 2 times daily for 7 days     Dispense:  14 tablet     Refill:  0    fluconazole (DIFLUCAN) 100 MG tablet     Sig: Take 1 tablet by mouth daily for 7 days     Dispense:  7 tablet     Refill:  0    hydrocortisone (ANUSOL-HC) 25 MG suppository     Sig: Place 1 suppository rectally every 12 hours     Dispense:  25 suppository     Refill:  0    medroxyPROGESTERone (DEPO-PROVERA) injection 150 mg      Appointment in 1 year. Donny Diaz am scribing for, and in the presence of Dr. Martir Ellison. Electronically signed by: Franky Chavez 7/13/22 3:54 PM       I agree to the above documentation placed by my scribe Franky Chavez. I reviewed the scribe's note and agree with the documented findings and plan of care. Any areas of disagreement are noted on the chart. I have personally evaluated this patient. Additional findings are as noted. I agree with the chief complaint, past medical history, past surgical history, allergies, medications, social and family history as documented unless otherwise noted below.      Electronically signed by Martir Elilson MD on 7/14/2022 at 6:12 AM

## 2022-07-13 NOTE — PROGRESS NOTES
Patient was given Depo Provera in the Right Gluteus Corey. NDC# [id number]  LOT# [id number]  Exp date- 01.24  Patient's last injection was n/a. Patient's last annual exam was on 07/13/22. Patient tolerated well without difficulty.

## 2022-07-14 LAB
C TRACH DNA GENITAL QL NAA+PROBE: NEGATIVE
CANDIDA SPECIES, DNA PROBE: NEGATIVE
GARDNERELLA VAGINALIS, DNA PROBE: POSITIVE
N. GONORRHOEAE DNA: NEGATIVE
SOURCE: ABNORMAL
SPECIMEN DESCRIPTION: NORMAL
TRICHOMONAS VAGINALIS DNA: NEGATIVE

## 2022-07-15 ENCOUNTER — TELEPHONE (OUTPATIENT)
Dept: OBGYN CLINIC | Age: 32
End: 2022-07-15

## 2022-07-15 LAB
HPV SAMPLE: NORMAL
HPV, GENOTYPE 16: NOT DETECTED
HPV, GENOTYPE 18: NOT DETECTED
HPV, HIGH RISK OTHER: NOT DETECTED
HPV, INTERPRETATION: NORMAL
SPECIMEN DESCRIPTION: NORMAL

## 2022-07-19 LAB — CYTOLOGY REPORT: NORMAL

## 2022-07-26 ENCOUNTER — TELEPHONE (OUTPATIENT)
Dept: FAMILY MEDICINE CLINIC | Age: 32
End: 2022-07-26

## 2022-07-26 ENCOUNTER — TELEMEDICINE (OUTPATIENT)
Dept: FAMILY MEDICINE CLINIC | Age: 32
End: 2022-07-26
Payer: MEDICARE

## 2022-07-26 DIAGNOSIS — R25.1 TREMOR OF BOTH HANDS: Primary | ICD-10-CM

## 2022-07-26 DIAGNOSIS — L70.0 ACNE VULGARIS: ICD-10-CM

## 2022-07-26 DIAGNOSIS — F31.30 BIPOLAR I DISORDER, MOST RECENT EPISODE DEPRESSED (HCC): Chronic | ICD-10-CM

## 2022-07-26 DIAGNOSIS — Z30.09 ENCOUNTER FOR COUNSELING REGARDING CONTRACEPTION: ICD-10-CM

## 2022-07-26 DIAGNOSIS — B18.2 CHRONIC HEPATITIS C WITHOUT HEPATIC COMA (HCC): ICD-10-CM

## 2022-07-26 PROBLEM — F19.11 HISTORY OF SUBSTANCE ABUSE (HCC): Status: RESOLVED | Noted: 2020-12-18 | Resolved: 2022-07-26

## 2022-07-26 PROCEDURE — G8427 DOCREV CUR MEDS BY ELIG CLIN: HCPCS | Performed by: FAMILY MEDICINE

## 2022-07-26 PROCEDURE — 99214 OFFICE O/P EST MOD 30 MIN: CPT | Performed by: FAMILY MEDICINE

## 2022-07-26 RX ORDER — PRIMIDONE 50 MG/1
50 TABLET ORAL 3 TIMES DAILY
Qty: 90 TABLET | Refills: 3 | Status: SHIPPED | OUTPATIENT
Start: 2022-07-26

## 2022-07-26 RX ORDER — CLINDAMYCIN AND BENZOYL PEROXIDE 10; 50 MG/G; MG/G
GEL TOPICAL
Qty: 120 G | Refills: 1 | Status: SHIPPED | OUTPATIENT
Start: 2022-07-26

## 2022-07-26 ASSESSMENT — PATIENT HEALTH QUESTIONNAIRE - PHQ9
1. LITTLE INTEREST OR PLEASURE IN DOING THINGS: 0
SUM OF ALL RESPONSES TO PHQ QUESTIONS 1-9: 0
SUM OF ALL RESPONSES TO PHQ QUESTIONS 1-9: 0
2. FEELING DOWN, DEPRESSED OR HOPELESS: 0
SUM OF ALL RESPONSES TO PHQ9 QUESTIONS 1 & 2: 0
SUM OF ALL RESPONSES TO PHQ QUESTIONS 1-9: 0
SUM OF ALL RESPONSES TO PHQ QUESTIONS 1-9: 0

## 2022-07-26 ASSESSMENT — ENCOUNTER SYMPTOMS
VOMITING: 0
ABDOMINAL DISTENTION: 0
CHEST TIGHTNESS: 0
CONSTIPATION: 0
COUGH: 0
NAUSEA: 0
WHEEZING: 0
ABDOMINAL PAIN: 0
DIARRHEA: 0
SHORTNESS OF BREATH: 0

## 2022-07-26 NOTE — PROGRESS NOTES
[address] Phone      Send Link Via Text    No text has been sent  Last text sent2022 2:29 PM  To:[phone]  Email      Send Link Via Email    No email has been sent  Last email sent2022 2:29 PM  To: [email or web address]  This patient cannot be sent a video visit link for this encounter. 2022    TELEHEALTH EVALUATION -- Audio[facility]Visual ([facility]      Heaven De Jesus (:  1990) is a 28 y.o. female,Established patient, here for evaluation of the following chief complaint(s): Fatigue, Depression, Anxiety, and Acne        ASSESSMENT/PLAN:    1. Tremor of both hands  Worsening  I absolutely declined gabapentin due to being a controlled substance, and recent overdose on opiates, benzodiazepines, admitted at [facility] on 3/18/2022.  -   start  primidone (MYSOLINE) 50 MG tablet; Take 1 tablet by mouth in the morning and 1 tablet at noon and 1 tablet before bedtime. , Disp-90 tablet, R-3Normal  -     [facility], [place], operated by [facility], Neurology, [address]  requested the labs done at psychiatrist  2. Bipolar I disorder, most recent episode depressed ([facility]  Improved with medications  Continue current treatment and follow up with psychiatrist and psychologist as scheduled at [address] [place] [address]. 3. Acne vulgaris  Worsening  -     clindamycin-benzoyl peroxide (BENZACLIN) 1-5 % gel; Apply topically 2 times daily. , Disp-120 g, R-1, Normal  -     Benzoyl Peroxide 10 % FOAM; Wash face 1-2 times a day and rinse well, Disp-156 g, R-0Normal  4. Chronic hepatitis C without hepatic coma ([facility]  She is unsure if it was ever treated, patient says her psychiatrist already tested her. I requested the report if not, we will need to check her labs next time, currently she refuses any labs    5.  Encounter for counseling regarding contraception  On Depo-Provera, every 3 months, vitamin D supplementation discussed    Phyllis received counseling on the following healthy behaviors: nutrition, exercise, medication adherence, and we discussed for her to absolutely abstain from alcoholic beverages or any drugs, she understands  Reviewed prior labs and health maintenance  Discussed use, benefit, and side effects of prescribed medications. Barriers to medication compliance addressed. Patient given educational materials - see patient instructions  All patient questions answered. Patient voiced understanding. The patient's past medical,surgical, social, and family history as well as her current medications and allergies were reviewed as documented in today's encounter. Medications, labs, diagnostic studies, consultations and follow-up as documented in this encounter. Return in about 4 weeks (around 2022). Please obtain the labs from [facility], patient might need to sign release information    No future appointments. SUBJECTIVE/OBJECTIVE:  Kenny Calixto (:  1990) has requested an audio/video evaluation for the following concern(s):Fatigue, Depression, Anxiety, and Acne      Patient reports she is here mainly to have her gabapentin refilled and to address worsening acne. Patient reports she was on gabapentin from psychiatrist for many years but the psychiatrist, Dr. Sylvie Painter, moved out. She says she only has 3 pills left, however OARRS done today proves that she had the last prescription in March. Patient reports she was just released from incarceration and she was given gabapentin while in [facility]. Patient says without gabapentin \"I shake really bad, as I was in a coma\"  She says she had blood work 1 month ago at [facility], I requested the report. Dr. Sylvie Painter move out, last refilled in March  Then incarcerated  Shaking badly if does not take gabapentin, repeating this to me multiple times. She does admit to alcohol use and dependence in the past but not recently.     She says she is now working at [employer]  She also tells me she has \"nerve palsy in the left swelling. Gastrointestinal:  Negative for abdominal distention, abdominal pain, constipation, diarrhea, nausea and vomiting. Neurological:  Positive for tremors and numbness (left arm). Negative for weakness. Psychiatric/Behavioral:  Positive for decreased concentration. Negative for dysphoric mood and sleep disturbance. The patient is nervous/anxious. Prior to Visit Medications    Medication Sig Taking? Authorizing Provider   VRAYLAR 1.5 MG capsule  Yes Historical Provider, MD   ibuprofen (ADVIL;MOTRIN) 800 MG tablet  Yes Historical Provider, MD   mirtazapine (REMERON) 15 MG tablet  Yes Historical Provider, MD   hydrocortisone (ANUSOL-HC) 25 MG suppository Place 1 suppository rectally every 12 hours Yes Jorge Carrasco MD   minocycline (MINOCIN;DYNACIN) 100 MG capsule Take 1 capsule by mouth 2 times daily Yes Bran Gómez MD   clindamycin-benzoyl peroxide (BENZACLIN) 1-5 % gel Apply topically 2 times daily. Yes Bran Gómez MD   tretinoin (RETIN-A) 0.01 % gel Apply topically nightly. Yes Bran Gómez MD   chlorhexidine (HIBICLENS) 4 % external liquid Apply topically daily as needed. Yes Don Macedo MD   albuterol sulfate HFA (PROVENTIL HFA) 108 (90 Base) MCG/ACT inhaler Inhale 2 puffs into the lungs every 6 hours as needed for Wheezing or Shortness of Breath Yes Don Macedo MD   Mometasone Furo-Formoterol Fum 50-5 MCG/ACT AERO Inhale 2 puffs into the lungs 2 times daily Yes Jailyn Fung MD   SUBOXONE 8-2 MG FILM SL film PLACE 1 STRIP UNDER THE TONGUE TWICE DAILY Yes Historical Provider, MD   hydrOXYzine (ATARAX) 25 MG tablet Take 1 tablet by mouth 3 times daily as needed for Anxiety Yes Jaiden CORADO MD   gabapentin (NEURONTIN) 300 MG capsule Take 1 capsule by mouth 3 times daily for 30 days.   Patient not taking: Reported on 7/13/2022  Radha Hicks MD   vilazodone HCl (VILAZODONE HCL) 10 MG TABS Take 1 tablet by mouth daily  Radha Hicks MD Social History     Tobacco Use    Smoking status: Never    Smokeless tobacco: Never   Substance Use Topics    Alcohol use: No     Alcohol/week: 0.0 standard drinks     Comment: prior abuse    Drug use: Not Currently     Types: Cocaine     Comment: Drug abuse includes Fentanyl, opoiates, benzos/Xanax, cocaine; pt is currently prescribed Suboxone          PHYSICAL EXAMINATION:    Vital Signs: (As obtained by patient/caregiver or practitioner observation)  -vital signs stable and within normal limits except Obesity per BMI, BMI 31.41 kg/m2  Patient-Reported Vitals 7/26/2022   Patient-Reported Weight 183 lbs   Patient-Reported Height 5 ft 4 in           Intensity of pain is:3/10       Constitutional: [x] Appears well-developed and well-nourished [x] No apparent distress      [x] Abnormal- obese      Mental status  [x] Alert and awake  [x] Oriented to person/place/time [x]Able to follow commands      Eyes:  EOM    [x]  Normal  [] Abnormal-  Sclera  [x]  Normal  [] Abnormal -         Discharge [x]  None visible  [] Abnormal -    HENT:   [x] Normocephalic, atraumatic.   [] Abnormal   [x] Mouth/Throat: Mucous membranes are moist.     External Ears [x] Normal  [] Abnormal-     Neck: [x] No visualized mass     Pulmonary/Chest: [x] Respiratory effort normal.  [x] No visualized signs of difficulty breathing or respiratory distress        [] Abnormal        Musculoskeletal:   [x] Normal gait with no signs of ataxia         [x] Normal range of motion of neck        [] Abnormal-       Neurological:        [x] No Facial Asymmetry (Cranial nerve 7 motor function) (limited exam to video visit)          [x] No gaze palsy        [x] Abnormal-  Intentional tremor in her hands noted mild, through the camera          Skin:        [x] No significant exanthematous lesions or discoloration noted on facial skin         [x] Abnormal-on the face, multiple scars, on the left side of the face there are 2 small erythematous skin lesions consistent with papules and small cysts           Psychiatric:      [x] No Hallucinations       []Mood is normal      [x]Behavior is normal      [x]Judgment is normal      [x]Thought content is normal       [x] Abnormal-anxious, talking very fast    Other pertinent observable physical exam findings- none      Due to this being a TeleHealth encounter, evaluation of the following organ systems is limited: Vitals/Constitutional/EENT/Resp/CV/GI//MS/Neuro/Skin/Heme-Lymph-Imm. Will Try to obtain the labs from [address] [place] [address] This Encounter   Medications    primidone (MYSOLINE) 50 MG tablet     Sig: Take 1 tablet by mouth in the morning and 1 tablet at noon and 1 tablet before bedtime. Dispense:  90 tablet     Refill:  3    clindamycin-benzoyl peroxide (BENZACLIN) 1-5 % gel     Sig: Apply topically 2 times daily. Dispense:  120 g     Refill:  1    Benzoyl Peroxide 10 % FOAM     Sig: Wash face 1-2 times a day and rinse well     Dispense:  156 g     Refill:  0       Orders Placed This Encounter   Procedures    [facility], Neurology, [facility]     Referral Priority:   Routine     Referral Type:   Eval and Treat     Referral Reason:   Specialty Services Required     Requested Specialty:   Neurology     Number of Visits Requested:   1         Medications Discontinued During This Encounter   Medication Reason    vilazodone HCl (VILAZODONE HCL) 10 MG TABS Therapy completed    hydrocortisone (ANUSOL-HC) 25 MG suppository Therapy completed    minocycline (MINOCIN;DYNACIN) 100 MG capsule Therapy completed    tretinoin (RETIN-A) 0.01 % gel Therapy completed    chlorhexidine (HIBICLENS) 4 % external liquid Alternate therapy    clindamycin-benzoyl peroxide (BENZACLIN) 1-5 % gel REORDER              Chaya Miranda, was evaluated through a synchronous (real-time) audio-video encounter.  The patient (or guardian if applicable) is aware that this is a billable service, which includes applicable co-pays. The virtual visit was conducted with patient's (and/or legal guardian consent). Verbal consent to proceed has been obtained within the past 12 months. The visit was conducted pursuant to the emergency declaration under the [facility], [address] [place] [address] authority and the [employer] Act. Patient identification was verified    Patient was alone   Provider was located at primary practice location. The patient was located at home, in a state where the provider was licensed to provide care. On this date 7/26/2022 I have spent 35 minutes reviewing previous notes, test results and face to face with the patient discussing the diagnosis and importance of compliance with the treatment plan as well as documenting on the day of the visit. --Arin Claudio MD on 8/1/2022 at 9:02 PM    An electronic signature was used to authenticate this note.

## 2022-07-26 NOTE — TELEPHONE ENCOUNTER
Called pt to check out and schedule f/u . Spoke with pt and she states will call in the morning to schedule a return in about 4 weeks (around 8/23/2022). Pt is going to [facility] tomorrow and will see if they will give her her lab results.

## 2022-08-01 PROBLEM — R25.1 TREMOR OF BOTH HANDS: Status: ACTIVE | Noted: 2022-08-01

## 2022-08-12 PROBLEM — Z01.419 WELL WOMAN EXAM: Status: RESOLVED | Noted: 2022-07-13 | Resolved: 2022-08-12

## 2022-09-29 ENCOUNTER — NURSE ONLY (OUTPATIENT)
Dept: OBGYN CLINIC | Age: 32
End: 2022-09-29
Payer: MEDICARE

## 2022-09-29 VITALS
DIASTOLIC BLOOD PRESSURE: 62 MMHG | HEART RATE: 86 BPM | SYSTOLIC BLOOD PRESSURE: 98 MMHG | BODY MASS INDEX: 30.79 KG/M2 | WEIGHT: 179.4 LBS

## 2022-09-29 DIAGNOSIS — Z32.02 NEGATIVE PREGNANCY TEST: ICD-10-CM

## 2022-09-29 DIAGNOSIS — N94.6 DYSMENORRHEA: Primary | ICD-10-CM

## 2022-09-29 LAB
CONTROL: NORMAL
PREGNANCY TEST URINE, POC: NEGATIVE

## 2022-09-29 PROCEDURE — 81025 URINE PREGNANCY TEST: CPT | Performed by: CLINICAL NURSE SPECIALIST

## 2022-09-29 PROCEDURE — 96372 THER/PROPH/DIAG INJ SC/IM: CPT | Performed by: CLINICAL NURSE SPECIALIST

## 2022-09-29 RX ORDER — MEDROXYPROGESTERONE ACETATE 150 MG/ML
150 INJECTION, SUSPENSION INTRAMUSCULAR ONCE
Status: COMPLETED | OUTPATIENT
Start: 2022-09-29 | End: 2022-09-29

## 2022-09-29 RX ADMIN — MEDROXYPROGESTERONE ACETATE 150 MG: 150 INJECTION, SUSPENSION INTRAMUSCULAR at 15:05

## 2022-09-29 NOTE — PROGRESS NOTES
Patient was given Depo Provera in the right dorsal gluteal  NDC# 25650-836-98  LOT# [id number]  Exp date- 04/30/2024  Patient's last injection was 07/13/2022. Patient's last annual exam was on 07/13/2022. Patient tolerated well without difficulty.

## 2022-10-03 NOTE — ED NOTES
Goal Outcome Evaluation:  Plan of Care Reviewed With: patient        Progress: improving   VSS, RA, A/Ox4. Anticipating d/c. Will continue plan of care.   Pt to ED c/o rash. Pt reports gradually worsened rash since February. Pt reports that rash initially developed in February when pt was incarcerated, states that she thought it was due to the sulfur in the water at half-way. Pt reports that she has been home for 2 weeks but rash has been persistent. Pt denies taking anything for rash PTA. Pt denies SOB, difficulty breathing, or difficulty swallowing.    On exam, pt with scattered pustules noted to the face, chest, and back with no active drainage at this time      Sharifa Rivas RN  06/21/21 [id number]

## 2022-11-16 ENCOUNTER — OFFICE VISIT (OUTPATIENT)
Dept: NEUROLOGY | Age: 32
End: 2022-11-16
Payer: MEDICARE

## 2022-11-16 VITALS
DIASTOLIC BLOOD PRESSURE: 73 MMHG | SYSTOLIC BLOOD PRESSURE: 113 MMHG | TEMPERATURE: 97.3 F | BODY MASS INDEX: 30.56 KG/M2 | OXYGEN SATURATION: 96 % | HEART RATE: 132 BPM | WEIGHT: 179 LBS | HEIGHT: 64 IN

## 2022-11-16 DIAGNOSIS — R29.898 LEFT ARM WEAKNESS: Primary | ICD-10-CM

## 2022-11-16 DIAGNOSIS — R25.1 TREMOR OF BOTH HANDS: ICD-10-CM

## 2022-11-16 DIAGNOSIS — T40.415S: ICD-10-CM

## 2022-11-16 DIAGNOSIS — G62.9 NEUROPATHY: ICD-10-CM

## 2022-11-16 PROCEDURE — 1036F TOBACCO NON-USER: CPT | Performed by: STUDENT IN AN ORGANIZED HEALTH CARE EDUCATION/TRAINING PROGRAM

## 2022-11-16 PROCEDURE — 99203 OFFICE O/P NEW LOW 30 MIN: CPT | Performed by: STUDENT IN AN ORGANIZED HEALTH CARE EDUCATION/TRAINING PROGRAM

## 2022-11-16 PROCEDURE — G8427 DOCREV CUR MEDS BY ELIG CLIN: HCPCS | Performed by: STUDENT IN AN ORGANIZED HEALTH CARE EDUCATION/TRAINING PROGRAM

## 2022-11-16 PROCEDURE — G8417 CALC BMI ABV UP PARAM F/U: HCPCS | Performed by: STUDENT IN AN ORGANIZED HEALTH CARE EDUCATION/TRAINING PROGRAM

## 2022-11-16 PROCEDURE — G8484 FLU IMMUNIZE NO ADMIN: HCPCS | Performed by: STUDENT IN AN ORGANIZED HEALTH CARE EDUCATION/TRAINING PROGRAM

## 2022-11-16 RX ORDER — PRIMIDONE 50 MG/1
50 TABLET ORAL NIGHTLY
Qty: 90 TABLET | Refills: 3 | Status: SHIPPED | OUTPATIENT
Start: 2022-11-16

## 2022-11-16 NOTE — PATIENT INSTRUCTIONS
- primidone 50 mg daily for tremor   - follow up with psychiatrist for PTSD,depression, anxiety.  -Follow-up after 3-month

## 2022-11-16 NOTE — PROGRESS NOTES
[place] [address] [id number]  Dept: [phone]  Dept Fax: [phone]    NEUROLOGY NEW PATIENT NOTE       PATIENT NAME: Larry Casas  PATIENT MRN: [id number]  PRIMARY CARE PHYSICIAN: Hal Moore MD      HPI:      Larry Casas is a 28 y.o. female patient is a new patient came in today for tremor in both hands, worsening,recent overdose on opioids and benzodiazepine admitted to [facility] on 3/18/2022, patient started on primidone 50 mg, patient was on gabapentin from her psychiatrist for many years, primidone and gabapentin helped the patient however patient is not taking these medication for the past 1 month because she ran out of, patient mentioned that usually gabapentin helped her numbness in extremities and primidone helps with tremor  Patient follows up with psychiatrist outpatient for PTSD, depression, and anxiety she is supposed to follow-up with the psychiatrist in the upcoming 1 month,    Last MRI was done in 2019 unremarkable noncontrast MRI    PREVIOUS WORKUP:     Lab Results   Component Value Date    WBC 7.6 2021    HGB 12.7 2021    HCT 37.1 2021    MCV 88.5 2021     2021       Past Medical History:   Diagnosis Date    Abnormal Pap smear     Acute kidney failure ([address]     Anemia     Anxiety     Asthma     Bipolar 1 disorder ([address]     Cardiac arrest ([facility]     Cellulitis 2019    Depression     Dialysis patient ([facility]     Pt taken off approx 10/1/2019    Drug abuse ([facility]     gets opiates from [address] per day    H/O 29 wk indicated  delivery 2015    D/t fetal bradycardia at [facility] office, sent to [facility] for STAT CS     Hepatitis C     History of substance abuse ([facility] 2020    Multiple, including opiates    HRP (high risk pregnancy) 2017    Kidney infection     Methadone maintenance 2014    6/18/15 Dose of 88mg confirmed with [facility]. - DISCHARGED from [facility] approximately 17  [facility] backline- [phone] Replacing Inactive Diagnoses  [facility] [phone]     Neurologic disorder     Opioid dependence with withdrawal ([facility] 2018    Polysubstance abuse ([facility]     Drug abuse includes Fentanyl, opoiates, benzos/Xanax, cocaine; pt is currently prescribed Suboxone    Polysubstance dependence ([facility] 2018    Opioid, cocaine, cannabis    Postpartum depression     RLTCS 17 M APG 8/9 Wt 7#12 2017    Seizures ([facility]     only when detoxing    Suicidal ideation     Ulceration     to right foot        Past Surgical History:   Procedure Laterality Date    ABDOMEN SURGERY       SECTION N/A 2017     SECTION performed by Lexx Foy MD at [facility] L&D [facility]      MA  DELIVERY ONLY  10-21-14    , Low Cervical        Social History     Socioeconomic History    Marital status: Single     Spouse name: Not on file    Number of children: Not on file    Years of education: Not on file    Highest education level: Not on file   Occupational History    Not on file   Tobacco Use    Smoking status: Never    Smokeless tobacco: Never   Vaping Use    Vaping Use: Not on file   Substance and Sexual Activity    Alcohol use: No     Alcohol/week: 0.0 standard drinks     Comment: prior abuse    Drug use: Not Currently     Types: Cocaine     Comment: Drug abuse includes Fentanyl, opoiates, benzos/Xanax, cocaine; pt is currently prescribed Suboxone    Sexual activity: Yes     Partners: Male   Other Topics Concern    Not on file   Social History Narrative    Not on file     Social Determinants of Health     Financial Resource Strain: Not on file   Food Insecurity: Not on file   Transportation Needs: Not on file   Physical Activity: Not on file   Stress: Not on file   Social Connections: Not on file   Intimate Partner Violence: Not on file   Housing Stability: Not on file        Current Outpatient Medications   Medication Sig Dispense Refill    primidone (MYSOLINE) 50 MG tablet Take 1 tablet by mouth in the morning and 1 tablet at noon and 1 tablet before bedtime. 90 tablet 3    clindamycin-benzoyl peroxide (BENZACLIN) 1-5 % gel Apply topically 2 times daily. 120 g 1    Benzoyl Peroxide 10 % FOAM Wash face 1-2 times a day and rinse well 156 g 0    VRAYLAR 1.5 MG capsule       ibuprofen (ADVIL;MOTRIN) 800 MG tablet       mirtazapine (REMERON) 15 MG tablet       albuterol sulfate HFA (PROVENTIL HFA) 108 (90 Base) MCG/ACT inhaler Inhale 2 puffs into the lungs every 6 hours as needed for Wheezing or Shortness of Breath 1 Inhaler 1    Mometasone Furo-Formoterol Fum 50-5 MCG/ACT AERO Inhale 2 puffs into the lungs 2 times daily 13 g 3    SUBOXONE 8-2 MG FILM SL film PLACE 1 STRIP UNDER THE TONGUE TWICE DAILY      hydrOXYzine (ATARAX) 25 MG tablet Take 1 tablet by mouth 3 times daily as needed for Anxiety 90 tablet 0     No current facility-administered medications for this visit. Allergies   Allergen Reactions    Penicillins Hives and Swelling     States throat swells    Buspirone      Other reaction(s): prev tried (mild)          REVIEW OF SYSTEMS:     Review of Systems     VITALS  /73 (Site: Right Upper Arm, Position: Sitting, Cuff Size: Large Adult)   Pulse (!) 132   Temp 97.3 °F (36.3 °C) (Temporal)   Ht 5' 4\" (1.626 m)   Wt 179 lb (81.2 kg)   SpO2 96%   BMI 30.73 kg/m²      PHYSICAL EXAMINATION:     Physical Exam   General appearance: cooperative  Skin: no rash or skin lesions. HEENT: normocephalic  Optic Fundi: deferred  Neck: supple, no cervcical adenopathy or carotid bruit  Lungs: clear to auscultation  Heart: Regular rate and rhythm, normal S1, S2. No murmurs, clicks or gallops.   Peripheral pulses: radial pulses palpable  Abdominal: BS present, soft, NT, ND  Extremities: no edema    NEUROLOGICAL EXAMINATION:     GENERAL  Appears comfortable and in no distress   HEENT  NC/ AT   HEART  S1 and S2 heard; palpation of pulses: radial pulse    NECK  Supple and no bruits heard   MENTAL STATUS:  Alert, oriented, intact memory, no confusion, normal speech, normal language, no hallucination or delusion   CRANIAL NERVES: II     -      Visual fields intact to confrontation  III,IV,VI -  PERR, EOMs full, no ptosis  V     -     Normal facial sensation   VII    -     Normal facial symmetry  VIII   -     Intact hearing   IX,X -     Symmetrical palate  XI    -     Symmetrical shoulder shrug  XII   -     Midline tongue, no atrophy    MOTOR FUNCTION: RUE: Significant for good strength of grade 5/5 in proximal and distal muscle groups   LUE: Significant for good strength of grade 5/5 in proximal and distal muscle groups   RLE: Significant for good strength of grade 5/5 in proximal and distal muscle groups   LLE: Significant for good strength of grade 5/5 in proximal and distal muscle groups      Normal bulk, normal tone and no involuntary movements, no tremor   SENSORY FUNCTION:  Normal touch, normal pin, normal vibration, normal proprioception   CEREBELLAR FUNCTION:  Intact fine motor control over upper limbs and lower limbs   REFLEX FUNCTION:  Symmetric in upper and lower extremities, no Babinski sign   STATION and GAIT  Normal gait and tandem station, normal tip toes and heel walking     ASSESSMENT:     72-year-old female came in for tremors bilateral arms, numbness bilateral lower extremities and upper extremities, patient usually take primidone 50 mg however she ran out of primidone for the last month, patient follow-up with psychiatrist for PTSD, depression and anxiety,    -Patient has a history of opioid overdose she transferred to [facility], almost 1 year ago since then the patient has this tremors bilateral upper extremities, primidone helps however she has been off primidone for almost 1 month,    PLAN:     primidone 50 mg daily for tremor    follow up with psychiatrist for PTSD,depression, anxiety. Follow-up after 3 months    Ms. Randolph Aguiar received counseling on the following healthy behaviors: medical compliance, smoking cessation, blood pressure control, regular follow up with primary doctor.         Electronically signed by Micha Saucedo MD on 11/16/2022 at 2:55 PM

## 2022-12-09 ENCOUNTER — HOSPITAL ENCOUNTER (EMERGENCY)
Age: 32
Discharge: HOME | End: 2022-12-09
Attending: SPECIALIST
Payer: MEDICARE

## 2022-12-09 ENCOUNTER — APPOINTMENT (OUTPATIENT)
Dept: CT IMAGING | Age: 32
End: 2022-12-09
Payer: MEDICARE

## 2022-12-09 VITALS
TEMPERATURE: 97.7 F | WEIGHT: 180 LBS | HEIGHT: 64 IN | DIASTOLIC BLOOD PRESSURE: 68 MMHG | SYSTOLIC BLOOD PRESSURE: 115 MMHG | RESPIRATION RATE: 16 BRPM | BODY MASS INDEX: 30.73 KG/M2 | OXYGEN SATURATION: 100 % | HEART RATE: 92 BPM

## 2022-12-09 DIAGNOSIS — S09.90XA CLOSED HEAD INJURY, INITIAL ENCOUNTER: Primary | ICD-10-CM

## 2022-12-09 DIAGNOSIS — S16.1XXA ACUTE STRAIN OF NECK MUSCLE, INITIAL ENCOUNTER: ICD-10-CM

## 2022-12-09 PROCEDURE — 71045 X-RAY EXAM CHEST 1 VIEW: CPT

## 2022-12-09 PROCEDURE — 70450 CT HEAD/BRAIN W/O DYE: CPT

## 2022-12-09 PROCEDURE — 99284 EMERGENCY DEPT VISIT MOD MDM: CPT

## 2022-12-09 PROCEDURE — 72125 CT NECK SPINE W/O DYE: CPT

## 2022-12-09 ASSESSMENT — PAIN SCALES - GENERAL: PAINLEVEL_OUTOF10: 6

## 2022-12-09 ASSESSMENT — PAIN - FUNCTIONAL ASSESSMENT: PAIN_FUNCTIONAL_ASSESSMENT: 0-10

## 2022-12-09 ASSESSMENT — PAIN DESCRIPTION - LOCATION: LOCATION: NECK

## 2022-12-09 ASSESSMENT — PAIN DESCRIPTION - ONSET: ONSET: SUDDEN

## 2022-12-09 ASSESSMENT — ENCOUNTER SYMPTOMS: SHORTNESS OF BREATH: 0

## 2022-12-09 ASSESSMENT — PAIN DESCRIPTION - PAIN TYPE: TYPE: ACUTE PAIN

## 2022-12-09 ASSESSMENT — PAIN DESCRIPTION - ORIENTATION: ORIENTATION: POSTERIOR
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
## 2022-12-09 NOTE — RESULT ENCOUNTER NOTE
Noted, addressed in emergency room  Future Appointments  12/15/2022 2:00 PM    SCHEDULE, JERMAINE FOSS OB* OB Clista Shallow  2/22/2023  1:00 PM    Antonella Fabian MD         Neuro [facility]        Neurology -

## 2022-12-09 NOTE — RESULT ENCOUNTER NOTE
Addressed in ED     Future Appointments  12/15/2022 2:00 PM    SCHEDULE, JERMAINE FOSS OB* OB Claudine Warner  2/22/2023  1:00 PM    Rebecca Dailey MD         Neuro [facility]        Neurology -

## 2022-12-09 NOTE — DISCHARGE INSTRUCTIONS
PLEASE RETURN TO THE EMERGENCY DEPARTMENT IMMEDIATELY if your symptoms worsen in anyway or in 8-12 hours if not improved for re-evaluation. You should immediately return to the ER for symptoms such as new or worsening pain, fever, visual or hearing changes, stiff neck, rash, a feeling of passing out, chest pain, shortness of breath, persistent nausea and/or vomiting, numbness or weakness to the arms or legs, coolness or color change of the arms or legs. You should avoid contact sports or activities where you might hit your head for a minimum of 1 week. Take your medication as indicated and prescribed. If you are given an antibiotic then, make sure you get the prescription filled and take the antibiotics until finished. Please understand that at this time there is no evidence for a more serious underlying process, but that early in the process of an illness or injury, an emergency department workup can be falsely reassuring. You should contact your family doctor within the next 24 hours for a follow up appointment    Kristin Sethi!!!    From [facility]) and [facility]    On behalf of the Emergency Department staff at [facility]), I would like to thank you for giving us the opportunity to address your health care needs and concerns. We hope that during your visit, our service was delivered in a professional and caring manner. Please keep [facility]) in mind as we walk with you down the path to your own personal wellness. Please expect an automated text message or email from us so we can ask a few questions about your health and progress. Based on your answers, a clinician may call you back to offer help and instructions. Please understand that early in the process of an illness or injury, an emergency department workup can be falsely reassuring. If you notice any worsening, changing or persistent symptoms please call your family doctor or return to the ER immediately.      Tell us how we did during your visit at [email or web address]   and let us know about your experience

## 2023-01-31 NOTE — GROUP NOTE
Group Therapy Note    Date: July 23    Group Start Time: 0900  Group End Time: 4269  Group Topic: [address] [facility]    Patient did not attend [employer] after encouragement from staff. 1:1 talk time offered but patient refused.       Signature:  Christopher Starks Mother instructed on how to tape fingers together.

## 2023-07-28 ENCOUNTER — HOSPITAL ENCOUNTER (OUTPATIENT)
Age: 33
Setting detail: SPECIMEN
Discharge: HOME | End: 2023-07-28

## 2023-07-28 ENCOUNTER — OFFICE VISIT (OUTPATIENT)
Dept: OBGYN CLINIC | Age: 33
End: 2023-07-28

## 2023-07-28 VITALS
HEART RATE: 81 BPM | SYSTOLIC BLOOD PRESSURE: 94 MMHG | DIASTOLIC BLOOD PRESSURE: 74 MMHG | BODY MASS INDEX: 26.02 KG/M2 | WEIGHT: 151.6 LBS

## 2023-07-28 DIAGNOSIS — Z11.3 SCREEN FOR STD (SEXUALLY TRANSMITTED DISEASE): ICD-10-CM

## 2023-07-28 DIAGNOSIS — N93.9 VAGINAL BLEEDING: ICD-10-CM

## 2023-07-28 DIAGNOSIS — Z01.419 WELL WOMAN EXAM: ICD-10-CM

## 2023-07-28 DIAGNOSIS — Z01.419 WELL WOMAN EXAM: Primary | ICD-10-CM

## 2023-07-28 DIAGNOSIS — N92.6 IRREGULAR BLEEDING: ICD-10-CM

## 2023-07-28 LAB
CANDIDA SPECIES: NEGATIVE
GARDNERELLA VAGINALIS: NEGATIVE
SOURCE: NORMAL
TRICHOMONAS: NEGATIVE

## 2023-07-28 PROCEDURE — 99395 PREV VISIT EST AGE 18-39: CPT | Performed by: SPECIALIST

## 2023-07-28 RX ORDER — ETONOGESTREL AND ETHINYL ESTRADIOL 11.7; 2.7 MG/1; MG/1
1 INSERT, EXTENDED RELEASE VAGINAL
Qty: 3 EACH | Refills: 3 | Status: SHIPPED | OUTPATIENT
Start: 2023-07-28

## 2023-07-28 ASSESSMENT — ENCOUNTER SYMPTOMS
NAUSEA: 0
ABDOMINAL DISTENTION: 0
COUGH: 0
CONSTIPATION: 0
APNEA: 0
ABDOMINAL PAIN: 0
VOMITING: 0
EYE PAIN: 0
DIARRHEA: 0

## 2023-07-28 NOTE — PROGRESS NOTES
documentation placed by my scribe Fox Pan. I reviewed the scribe's note and agree with the documented findings and plan of care. Any areas of disagreement are noted on the chart. I have personally evaluated this patient. Additional findings are as noted. I agree with the chief complaint, past medical history, past surgical history, allergies, medications, social and family history as documented unless otherwise noted below.      Electronically signed by Janice Sprague MD on 7/30/2023 at 12:09 PM

## 2023-07-30 PROBLEM — Z11.3 SCREEN FOR STD (SEXUALLY TRANSMITTED DISEASE): Status: ACTIVE | Noted: 2022-07-13

## 2023-07-30 PROBLEM — N93.9 VAGINAL BLEEDING: Status: ACTIVE | Noted: 2023-07-30

## 2023-07-31 LAB
C TRACH DNA SPEC QL PROBE+SIG AMP: NEGATIVE
N GONORRHOEA DNA SPEC QL PROBE+SIG AMP: NEGATIVE
SPECIMEN DESCRIPTION: NORMAL

## 2023-08-08 NOTE — BH NOTE
PRN Note:    IM Vistaril 50mg given for active vomiting, patient accepted medication willingly, patient currently resting in her room will continue to monitor. Ambulatory

## 2023-08-29 PROBLEM — Z11.3 SCREEN FOR STD (SEXUALLY TRANSMITTED DISEASE): Status: RESOLVED | Noted: 2022-07-13 | Resolved: 2023-08-29

## 2023-10-17 ENCOUNTER — PATIENT MESSAGE (OUTPATIENT)
Dept: OBGYN CLINIC | Age: 33
End: 2023-10-17

## 2023-10-17 NOTE — TELEPHONE ENCOUNTER
Please call the office  The phone we have for you is not in service  This is regarding your birth control

## 2023-10-24 ENCOUNTER — TELEPHONE (OUTPATIENT)
Dept: FAMILY MEDICINE CLINIC | Age: 33
End: 2023-10-24

## 2023-10-24 NOTE — TELEPHONE ENCOUNTER
1st attempt - Called pt to reschedule, phone rang busy no alternate number listed  2nd attempt - My chart message sent

## 2023-12-05 ENCOUNTER — OFFICE VISIT (OUTPATIENT)
Dept: FAMILY MEDICINE CLINIC | Age: 33
End: 2023-12-05
Payer: MEDICAID

## 2023-12-05 VITALS
WEIGHT: 179 LBS | BODY MASS INDEX: 30.56 KG/M2 | TEMPERATURE: 97.8 F | HEIGHT: 64 IN | HEART RATE: 84 BPM | OXYGEN SATURATION: 97 % | DIASTOLIC BLOOD PRESSURE: 80 MMHG | SYSTOLIC BLOOD PRESSURE: 120 MMHG

## 2023-12-05 DIAGNOSIS — Z00.00 ENCOUNTER FOR WELL ADULT EXAM WITHOUT ABNORMAL FINDINGS: Primary | ICD-10-CM

## 2023-12-05 DIAGNOSIS — B19.20 HEPATITIS C VIRUS INFECTION WITHOUT HEPATIC COMA, UNSPECIFIED CHRONICITY: ICD-10-CM

## 2023-12-05 DIAGNOSIS — Z13.1 SCREENING FOR DIABETES MELLITUS: ICD-10-CM

## 2023-12-05 DIAGNOSIS — Z86.74 HISTORY OF CARDIAC ARREST: ICD-10-CM

## 2023-12-05 DIAGNOSIS — L70.0 ACNE VULGARIS: ICD-10-CM

## 2023-12-05 DIAGNOSIS — Z13.220 SCREENING FOR HYPERLIPIDEMIA: ICD-10-CM

## 2023-12-05 DIAGNOSIS — Z11.59 SCREENING FOR VIRAL DISEASE: ICD-10-CM

## 2023-12-05 DIAGNOSIS — R00.2 PALPITATIONS: ICD-10-CM

## 2023-12-05 DIAGNOSIS — R53.82 CHRONIC FATIGUE: ICD-10-CM

## 2023-12-05 DIAGNOSIS — R11.0 NAUSEA: ICD-10-CM

## 2023-12-05 PROCEDURE — 99395 PREV VISIT EST AGE 18-39: CPT | Performed by: NURSE PRACTITIONER

## 2023-12-05 RX ORDER — CLINDAMYCIN AND BENZOYL PEROXIDE 10; 50 MG/G; MG/G
GEL TOPICAL
Qty: 120 G | Refills: 1 | Status: SHIPPED | OUTPATIENT
Start: 2023-12-05

## 2023-12-05 RX ORDER — CLONIDINE HYDROCHLORIDE 0.1 MG/1
0.1 TABLET ORAL DAILY
COMMUNITY
Start: 2023-11-09

## 2023-12-05 RX ORDER — ONDANSETRON 4 MG/1
4 TABLET, ORALLY DISINTEGRATING ORAL 3 TIMES DAILY PRN
Qty: 21 TABLET | Refills: 0 | Status: SHIPPED | OUTPATIENT
Start: 2023-12-05

## 2023-12-05 RX ORDER — FAMOTIDINE 20 MG/1
20 TABLET, FILM COATED ORAL 2 TIMES DAILY
Qty: 60 TABLET | Refills: 3 | Status: SHIPPED | OUTPATIENT
Start: 2023-12-05

## 2023-12-05 RX ORDER — GABAPENTIN 300 MG/1
300 CAPSULE ORAL DAILY
COMMUNITY
Start: 2023-11-09

## 2023-12-05 RX ORDER — DEXTROAMPHETAMINE SACCHARATE, AMPHETAMINE ASPARTATE, DEXTROAMPHETAMINE SULFATE AND AMPHETAMINE SULFATE 2.5; 2.5; 2.5; 2.5 MG/1; MG/1; MG/1; MG/1
10 TABLET ORAL 2 TIMES DAILY
COMMUNITY
Start: 2023-11-09

## 2023-12-05 RX ORDER — HYDROXYZINE PAMOATE 25 MG/1
25 CAPSULE ORAL
COMMUNITY
Start: 2023-11-11

## 2023-12-05 RX ORDER — BENZOYL PEROXIDE 100 MG/ML
LIQUID TOPICAL
Qty: 227 G | Refills: 0 | Status: SHIPPED | OUTPATIENT
Start: 2023-12-05

## 2023-12-05 SDOH — ECONOMIC STABILITY: FOOD INSECURITY: WITHIN THE PAST 12 MONTHS, THE FOOD YOU BOUGHT JUST DIDN'T LAST AND YOU DIDN'T HAVE MONEY TO GET MORE.: NEVER TRUE

## 2023-12-05 SDOH — ECONOMIC STABILITY: INCOME INSECURITY: HOW HARD IS IT FOR YOU TO PAY FOR THE VERY BASICS LIKE FOOD, HOUSING, MEDICAL CARE, AND HEATING?: NOT HARD AT ALL

## 2023-12-05 SDOH — ECONOMIC STABILITY: FOOD INSECURITY: WITHIN THE PAST 12 MONTHS, YOU WORRIED THAT YOUR FOOD WOULD RUN OUT BEFORE YOU GOT MONEY TO BUY MORE.: NEVER TRUE

## 2023-12-05 ASSESSMENT — PATIENT HEALTH QUESTIONNAIRE - PHQ9
8. MOVING OR SPEAKING SO SLOWLY THAT OTHER PEOPLE COULD HAVE NOTICED. OR THE OPPOSITE, BEING SO FIGETY OR RESTLESS THAT YOU HAVE BEEN MOVING AROUND A LOT MORE THAN USUAL: 0
SUM OF ALL RESPONSES TO PHQ QUESTIONS 1-9: 0
1. LITTLE INTEREST OR PLEASURE IN DOING THINGS: 0
4. FEELING TIRED OR HAVING LITTLE ENERGY: 0
3. TROUBLE FALLING OR STAYING ASLEEP: 0
7. TROUBLE CONCENTRATING ON THINGS, SUCH AS READING THE NEWSPAPER OR WATCHING TELEVISION: 0
9. THOUGHTS THAT YOU WOULD BE BETTER OFF DEAD, OR OF HURTING YOURSELF: 0
SUM OF ALL RESPONSES TO PHQ9 QUESTIONS 1 & 2: 0
SUM OF ALL RESPONSES TO PHQ QUESTIONS 1-9: 0
SUM OF ALL RESPONSES TO PHQ QUESTIONS 1-9: 0
5. POOR APPETITE OR OVEREATING: 0
SUM OF ALL RESPONSES TO PHQ QUESTIONS 1-9: 0
10. IF YOU CHECKED OFF ANY PROBLEMS, HOW DIFFICULT HAVE THESE PROBLEMS MADE IT FOR YOU TO DO YOUR WORK, TAKE CARE OF THINGS AT HOME, OR GET ALONG WITH OTHER PEOPLE: 0
6. FEELING BAD ABOUT YOURSELF - OR THAT YOU ARE A FAILURE OR HAVE LET YOURSELF OR YOUR FAMILY DOWN: 0
2. FEELING DOWN, DEPRESSED OR HOPELESS: 0

## 2023-12-05 ASSESSMENT — COLUMBIA-SUICIDE SEVERITY RATING SCALE - C-SSRS
7. DID THIS OCCUR IN THE LAST THREE MONTHS: NO
3. HAVE YOU BEEN THINKING ABOUT HOW YOU MIGHT KILL YOURSELF?: NO
5. HAVE YOU STARTED TO WORK OUT OR WORKED OUT THE DETAILS OF HOW TO KILL YOURSELF? DO YOU INTEND TO CARRY OUT THIS PLAN?: NO
4. HAVE YOU HAD THESE THOUGHTS AND HAD SOME INTENTION OF ACTING ON THEM?: NO

## 2023-12-05 ASSESSMENT — ANXIETY QUESTIONNAIRES
6. BECOMING EASILY ANNOYED OR IRRITABLE: 1
1. FEELING NERVOUS, ANXIOUS, OR ON EDGE: 1
IF YOU CHECKED OFF ANY PROBLEMS ON THIS QUESTIONNAIRE, HOW DIFFICULT HAVE THESE PROBLEMS MADE IT FOR YOU TO DO YOUR WORK, TAKE CARE OF THINGS AT HOME, OR GET ALONG WITH OTHER PEOPLE: EXTREMELY DIFFICULT
2. NOT BEING ABLE TO STOP OR CONTROL WORRYING: 1
GAD7 TOTAL SCORE: 7
5. BEING SO RESTLESS THAT IT IS HARD TO SIT STILL: 1
4. TROUBLE RELAXING: 1
7. FEELING AFRAID AS IF SOMETHING AWFUL MIGHT HAPPEN: 1
3. WORRYING TOO MUCH ABOUT DIFFERENT THINGS: 1

## 2023-12-05 NOTE — PROGRESS NOTES
Visit Information    Have you changed or started any medications since your last visit including any over-the-counter medicines, vitamins, or herbal medicines? yes -     Have you stopped taking any of your medications? Is so, why? -  no  Are you having any side effects from any of your medications? - no    Have you seen any other physician or provider since your last visit? yes -     Have you had any other diagnostic tests since your last visit? yes -     Have you been seen in the emergency room and/or had an admission in a hospital since we last saw you?  no   Have you had your routine dental cleaning in the past 6 months?  yes -       Do you have an active MyChart account? If no, what is the barrier?   Yes    Patient Care Team:  Thania Self MD as PCP - General (Family Medicine)  Thania Self MD as PCP - Empaneled Provider  Rosario Sosa MD as Consulting Physician (Obstetrics & Gynecology)  Jill Huffman MD as Consulting Physician (Psychiatry)  Art Zimmerman MD as Consulting Physician (Neurology)    Medical History Review  Past Medical, Family, and Social History reviewed and does contribute to the patient presenting condition    Health Maintenance   Topic Date Due    Hepatitis B vaccine (1 of 3 - 3-dose series) Never done    Varicella vaccine (1 of 2 - 2-dose childhood series) Never done    Hepatitis A vaccine (1 of 2 - Risk 2-dose series) Never done    Pneumococcal 0-64 years Vaccine (1 - PCV) 09/01/2024 (Originally 5/28/1996)    COVID-19 Vaccine (1) 09/01/2024 (Originally 1990)    Flu vaccine (1) 12/05/2024 (Originally 8/1/2023)    Depression Monitoring  12/05/2024    DTaP/Tdap/Td vaccine (5 - Td or Tdap) 03/23/2027    Cervical cancer screen  07/13/2027    HIV screen  Completed    Hib vaccine  Aged Out    HPV vaccine  Aged Out    Meningococcal (ACWY) vaccine  Aged Out    Depression Screen  Discontinued             Well Adult Note  Name: Terell Pacheco Date:

## 2023-12-06 ASSESSMENT — ENCOUNTER SYMPTOMS
DIARRHEA: 0
ROS SKIN COMMENTS: HISTORY OF ACNE
CHEST TIGHTNESS: 0
WHEEZING: 0
VOMITING: 0
NAUSEA: 0
BLOOD IN STOOL: 0
BACK PAIN: 0
COUGH: 0
ABDOMINAL PAIN: 0
ABDOMINAL DISTENTION: 0
SHORTNESS OF BREATH: 0
CONSTIPATION: 0

## 2024-01-12 ENCOUNTER — TELEPHONE (OUTPATIENT)
Dept: GASTROENTEROLOGY | Age: 34
End: 2024-01-12

## 2024-01-12 NOTE — TELEPHONE ENCOUNTER
Writer called pt and left v/m asking pt to call back to r/s her 2-12-24 appt due to Dr Glez not being in the [place] office.

## 2024-05-13 ENCOUNTER — OFFICE VISIT (OUTPATIENT)
Dept: FAMILY MEDICINE CLINIC | Age: 34
End: 2024-05-13
Payer: MEDICAID

## 2024-05-13 ENCOUNTER — HOSPITAL ENCOUNTER (OUTPATIENT)
Age: 34
Discharge: HOME | End: 2024-05-13
Payer: MEDICAID

## 2024-05-13 VITALS
BODY MASS INDEX: 30.39 KG/M2 | HEART RATE: 70 BPM | OXYGEN SATURATION: 98 % | HEIGHT: 64 IN | SYSTOLIC BLOOD PRESSURE: 110 MMHG | DIASTOLIC BLOOD PRESSURE: 70 MMHG | WEIGHT: 178 LBS

## 2024-05-13 DIAGNOSIS — B02.7 DISSEMINATED HERPES ZOSTER: Primary | ICD-10-CM

## 2024-05-13 DIAGNOSIS — Z13.1 SCREENING FOR DIABETES MELLITUS: ICD-10-CM

## 2024-05-13 DIAGNOSIS — J45.40 MODERATE PERSISTENT ASTHMA WITHOUT COMPLICATION: ICD-10-CM

## 2024-05-13 DIAGNOSIS — Z13.220 SCREENING FOR HYPERLIPIDEMIA: ICD-10-CM

## 2024-05-13 DIAGNOSIS — Z11.59 SCREENING FOR VIRAL DISEASE: ICD-10-CM

## 2024-05-13 DIAGNOSIS — B19.20 HEPATITIS C VIRUS INFECTION WITHOUT HEPATIC COMA, UNSPECIFIED CHRONICITY: ICD-10-CM

## 2024-05-13 DIAGNOSIS — R53.82 CHRONIC FATIGUE: ICD-10-CM

## 2024-05-13 LAB
25(OH)D3 SERPL-MCNC: 24.9 NG/ML (ref 30–100)
ALBUMIN SERPL-MCNC: 4.2 G/DL (ref 3.5–5.2)
ALP SERPL-CCNC: 45 U/L (ref 35–104)
ALT SERPL-CCNC: 11 U/L (ref 5–33)
ANION GAP SERPL CALCULATED.3IONS-SCNC: 10 MMOL/L (ref 9–17)
AST SERPL-CCNC: 12 U/L
BILIRUB SERPL-MCNC: 0.4 MG/DL (ref 0.3–1.2)
BUN SERPL-MCNC: 12 MG/DL (ref 6–20)
CALCIUM SERPL-MCNC: 9.8 MG/DL (ref 8.6–10.4)
CHLORIDE SERPL-SCNC: 100 MMOL/L (ref 98–107)
CHOLEST SERPL-MCNC: 161 MG/DL
CHOLESTEROL/HDL RATIO: 2.7
CO2 SERPL-SCNC: 26 MMOL/L (ref 20–31)
CREAT SERPL-MCNC: 0.9 MG/DL (ref 0.5–0.9)
ERYTHROCYTE [DISTWIDTH] IN BLOOD BY AUTOMATED COUNT: 12.5 % (ref 11.5–14.9)
EST. AVERAGE GLUCOSE BLD GHB EST-MCNC: 65 MG/DL
FOLATE SERPL-MCNC: 2.4 NG/ML (ref 4.8–24.2)
GFR, ESTIMATED: 87 ML/MIN/1.73M2
GLUCOSE SERPL-MCNC: 88 MG/DL (ref 70–99)
HAV AB SERPL IA-ACNC: NONREACTIVE
HBA1C MFR BLD: 3.9 % (ref 4–6)
HBV SURFACE AB SERPL IA-ACNC: <3.5 MIU/ML
HCT VFR BLD AUTO: 37 % (ref 36–46)
HCV AB SERPL QL IA: REACTIVE
HDLC SERPL-MCNC: 59 MG/DL
HGB BLD-MCNC: 12.9 G/DL (ref 12–16)
LDLC SERPL CALC-MCNC: 74 MG/DL (ref 0–130)
MAGNESIUM SERPL-MCNC: 1.5 MG/DL (ref 1.6–2.6)
MCH RBC QN AUTO: 31.2 PG (ref 26–34)
MCHC RBC AUTO-ENTMCNC: 34.7 G/DL (ref 31–37)
MCV RBC AUTO: 89.7 FL (ref 80–100)
PLATELET # BLD AUTO: 137 K/UL (ref 150–450)
PMV BLD AUTO: 8.9 FL (ref 6–12)
POTASSIUM SERPL-SCNC: 3.6 MMOL/L (ref 3.7–5.3)
PROT SERPL-MCNC: 6.2 G/DL (ref 6.4–8.3)
RBC # BLD AUTO: 4.13 M/UL (ref 4–5.2)
SODIUM SERPL-SCNC: 136 MMOL/L (ref 135–144)
TRIGL SERPL-MCNC: 139 MG/DL
TSH SERPL DL<=0.05 MIU/L-ACNC: 1.39 UIU/ML (ref 0.3–5)
VIT B12 SERPL-MCNC: <150 PG/ML (ref 232–1245)
WBC OTHER # BLD: 6.9 K/UL (ref 3.5–11)

## 2024-05-13 PROCEDURE — 85027 COMPLETE CBC AUTOMATED: CPT

## 2024-05-13 PROCEDURE — 83036 HEMOGLOBIN GLYCOSYLATED A1C: CPT

## 2024-05-13 PROCEDURE — 86708 HEPATITIS A ANTIBODY: CPT

## 2024-05-13 PROCEDURE — 80053 COMPREHEN METABOLIC PANEL: CPT

## 2024-05-13 PROCEDURE — 82746 ASSAY OF FOLIC ACID SERUM: CPT

## 2024-05-13 PROCEDURE — 99214 OFFICE O/P EST MOD 30 MIN: CPT | Performed by: FAMILY MEDICINE

## 2024-05-13 PROCEDURE — 82306 VITAMIN D 25 HYDROXY: CPT

## 2024-05-13 PROCEDURE — 80061 LIPID PANEL: CPT

## 2024-05-13 PROCEDURE — 36415 COLL VENOUS BLD VENIPUNCTURE: CPT

## 2024-05-13 PROCEDURE — 86803 HEPATITIS C AB TEST: CPT

## 2024-05-13 PROCEDURE — 86787 VARICELLA-ZOSTER ANTIBODY: CPT

## 2024-05-13 PROCEDURE — 84443 ASSAY THYROID STIM HORMONE: CPT

## 2024-05-13 PROCEDURE — 82607 VITAMIN B-12: CPT

## 2024-05-13 PROCEDURE — 83735 ASSAY OF MAGNESIUM: CPT

## 2024-05-13 PROCEDURE — 86317 IMMUNOASSAY INFECTIOUS AGENT: CPT

## 2024-05-13 RX ORDER — ACYCLOVIR 50 MG/G
OINTMENT TOPICAL
Qty: 1 EACH | Refills: 1 | Status: SHIPPED | OUTPATIENT
Start: 2024-05-13 | End: 2024-05-20

## 2024-05-13 RX ORDER — VALACYCLOVIR HYDROCHLORIDE 1 G/1
1000 TABLET, FILM COATED ORAL 2 TIMES DAILY
Qty: 20 TABLET | Refills: 0 | Status: SHIPPED | OUTPATIENT
Start: 2024-05-13 | End: 2024-05-23

## 2024-05-13 RX ORDER — ALBUTEROL SULFATE 90 UG/1
2 AEROSOL, METERED RESPIRATORY (INHALATION) EVERY 6 HOURS PRN
Qty: 1 EACH | Refills: 1 | Status: SHIPPED | OUTPATIENT
Start: 2024-05-13

## 2024-05-13 ASSESSMENT — ENCOUNTER SYMPTOMS
RECTAL PAIN: 0
DIARRHEA: 0
SORE THROAT: 0
BLOOD IN STOOL: 0
ABDOMINAL PAIN: 0
BACK PAIN: 0
WHEEZING: 0
VOMITING: 0
CONSTIPATION: 0
TROUBLE SWALLOWING: 0
RHINORRHEA: 0
NAUSEA: 0
ABDOMINAL DISTENTION: 0
STRIDOR: 0
EYE REDNESS: 0
CHEST TIGHTNESS: 0
COUGH: 0
SINUS PRESSURE: 0
SHORTNESS OF BREATH: 0

## 2024-05-13 ASSESSMENT — PATIENT HEALTH QUESTIONNAIRE - PHQ9
6. FEELING BAD ABOUT YOURSELF - OR THAT YOU ARE A FAILURE OR HAVE LET YOURSELF OR YOUR FAMILY DOWN: NOT AT ALL
SUM OF ALL RESPONSES TO PHQ QUESTIONS 1-9: 1
2. FEELING DOWN, DEPRESSED OR HOPELESS: SEVERAL DAYS
SUM OF ALL RESPONSES TO PHQ QUESTIONS 1-9: 1
8. MOVING OR SPEAKING SO SLOWLY THAT OTHER PEOPLE COULD HAVE NOTICED. OR THE OPPOSITE, BEING SO FIGETY OR RESTLESS THAT YOU HAVE BEEN MOVING AROUND A LOT MORE THAN USUAL: NOT AT ALL
3. TROUBLE FALLING OR STAYING ASLEEP: NOT AT ALL
SUM OF ALL RESPONSES TO PHQ QUESTIONS 1-9: 1
SUM OF ALL RESPONSES TO PHQ QUESTIONS 1-9: 1
1. LITTLE INTEREST OR PLEASURE IN DOING THINGS: NOT AT ALL
4. FEELING TIRED OR HAVING LITTLE ENERGY: NOT AT ALL
9. THOUGHTS THAT YOU WOULD BE BETTER OFF DEAD, OR OF HURTING YOURSELF: NOT AT ALL
SUM OF ALL RESPONSES TO PHQ9 QUESTIONS 1 & 2: 1
7. TROUBLE CONCENTRATING ON THINGS, SUCH AS READING THE NEWSPAPER OR WATCHING TELEVISION: NOT AT ALL
5. POOR APPETITE OR OVEREATING: NOT AT ALL

## 2024-05-13 NOTE — PATIENT INSTRUCTIONS
Dear valued patient,    We hope this message finds you in good health. At [ ], we are committed to providing you with the best possible healthcare experience. To further enhance your convenience and streamline your access to our services, we would like to introduce you to the benefits of utilizing direct scheduling through the [facility] Patient Portal.    Direct scheduling is a feature within the [facility] Patient Portal that allows you to schedule appointments with your healthcare provider directly, without the need to make a phone call or wait for a callback. We understand that your time is rajiv, and we want to ensure that you have quick and easy access to our services whenever you need them.  Here are some reasons why you should consider utilizing direct scheduling through the [facility] Patient Portal:    1. Convenience: With direct scheduling, you can book appointments at any time that suits you best, 24 hours a day, 7 days a week. No more waiting on hold or playing phone tag with our office staff. It puts you in control of managing your healthcare appointments effortlessly.    2. Accessibility: The [facility] Patient Portal is accessible through your computer, smartphone, or tablet, allowing you to schedule appointments from the comfort of your home, office, or on the go. You can access your medical records, review test results, and communicate with your healthcare provider all in one secure and user-friendly platform.    3. Timesaving: By utilizing direct scheduling, you can avoid unnecessary delays and save rajiv time. You can easily browse the available appointment slots and select the one that works best for you, eliminating the back-and-forth communication typically required when scheduling via phone.    4. Reminders and notifications: [facility] Patient Portal sends automatic reminders for upcoming appointments, ensuring that you never miss an important visit. You can also receive notifications about test

## 2024-05-13 NOTE — PROGRESS NOTES
Visit Information    Have you changed or started any medications since your last visit including any over-the-counter medicines, vitamins, or herbal medicines? no   Are you having any side effects from any of your medications? -  no  Have you stopped taking any of your medications? Is so, why? -  no    Have you seen any other physician or provider since your last visit? No  Have you had any other diagnostic tests since your last visit? No  Have you been seen in the emergency room and/or had an admission to a hospital since we last saw you? [facility] for rash   Have you had your routine dental cleaning in the past 6 months? no    Have you activated your [employer] account? If not, what are your barriers? Yes     Patient Care Team:  Jailyn Fung MD as PCP - General (Family Medicine)  Jailyn Fung MD as PCP - Empaneled Provider  Zafar Encinas MD as Consulting Physician (Obstetrics & Gynecology)  Jani Kearney MD as Consulting Physician (Psychiatry)  Kamron Saleem MD as Consulting Physician (Neurology)    Medical History Review  Past Medical, Family, and Social History reviewed and does contribute to the patient presenting condition    Health Maintenance   Topic Date Due    Hepatitis B vaccine (1 of 3 - 3-dose series) Never done    Varicella vaccine (1 of 2 - 2-dose childhood series) Never done    Hepatitis A vaccine (1 of 2 - Risk 2-dose series) Never done    Pneumococcal 0-64 years Vaccine (1 of 2 - PCV) 09/01/2024 (Originally 5/28/1996)    COVID-19 Vaccine (1) 09/01/2024 (Originally 1990)    Flu vaccine (Season Ended) 12/05/2024 (Originally 8/1/2024)    Depression Monitoring  12/05/2024    DTaP/Tdap/Td vaccine (5 - Td or Tdap) 03/23/2027    Cervical cancer screen  07/13/2027    HIV screen  Completed    Hib vaccine  Aged Out    HPV vaccine  Aged Out    Polio vaccine  Aged Out    Meningococcal (ACWY) vaccine  Aged Out    Depression Screen  Discontinued     
lungs every 6 hours as needed for Wheezing or Shortness of Breath  Dispense: 1 each; Refill: 1      No orders of the defined types were placed in this encounter.        Medications Discontinued During This Encounter   Medication Reason    hydrOXYzine pamoate (VISTARIL) 25 MG capsule DUPLICATE    primidone (MYSOLINE) 50 MG tablet Therapy completed    VRAYLAR 1.5 MG capsule Therapy completed    Mometasone Furo-Formoterol Fum 50-5 MCG/ACT AERO Cost of medication    albuterol sulfate HFA (PROVENTIL HFA) 108 (90 Base) MCG/ACT inhaler REORDER       Phyllis received counseling on the following healthy behaviors: nutrition, exercise, and medication adherence  Reviewed prior labs and health maintenance.  Continue current medications, diet and exercise.  Discussed use, benefit, and side effects of prescribed medications. Barriers to medication compliance addressed.   Patient given educational materials - see patient instructions.    All patient questions answered.  Patient voiced understanding.     The patient'spast medical, surgical, social, and family history as well as her   current medications and allergies were reviewed as documented in today's encounter.      Medications, labs, diagnostic studies, consultations andfollow-up as documented in this encounter.    Return in about 6 months (around 11/13/2024) for annual exam 30min.    Patient wasseen with total face to face time of 30 minutes. More than 50% of this visit was counseling and education.       Future Appointments   Date Time Provider Department Center   11/13/2024  1:30 PM Jailyn Fung MD [facility]     This note was completed by using the assistance of a speech-recognition program. However, inadvertent computerized transcription errors may be present. Althoughevery effort was made to ensure accuracy, no guarantees can be provided that every mistake has been identified and corrected by editing.  Electronically signed by Igor Glez MD on 5/13/2024

## 2024-05-15 DIAGNOSIS — E83.42 HYPOMAGNESEMIA: ICD-10-CM

## 2024-05-15 DIAGNOSIS — E87.6 HYPOKALEMIA: ICD-10-CM

## 2024-05-15 DIAGNOSIS — E53.8 VITAMIN B 12 DEFICIENCY: Primary | ICD-10-CM

## 2024-05-15 DIAGNOSIS — E53.8 LOW FOLATE: ICD-10-CM

## 2024-05-15 LAB — VZV IGG SER QL IA: 0.61

## 2024-05-15 RX ORDER — FOLIC ACID 1 MG/1
1 TABLET ORAL DAILY
Qty: 90 TABLET | Refills: 1 | Status: SHIPPED | OUTPATIENT
Start: 2024-05-15

## 2024-05-15 RX ORDER — POTASSIUM CHLORIDE 750 MG/1
10 TABLET, EXTENDED RELEASE ORAL DAILY
Qty: 30 TABLET | Refills: 0 | Status: SHIPPED | OUTPATIENT
Start: 2024-05-15 | End: 2024-06-14

## 2024-05-15 NOTE — RESULT ENCOUNTER NOTE
Please notify patient that she is not immune to varicella and would benefit for immunization.    Vitamin D is low, will place on daily supplement.    B12 is low, folate is low, will place on supplements and recheck levels in 1 month.  Patient would likely benefit from B12 injections if she interested in this?  If not, we can do p.o. and recheck levels in 1 month.    Hepatitis C is reactive, known history.  Continue to follow with GI.    Magnesium is low, potassium is low and would benefit from supplements.  Will order these, and recheck levels in 1 month.    Platelets are bit low, looks like they have been in the past as well.    Not immune to hepatitis A or B would benefit from immunization.

## 2024-05-16 DIAGNOSIS — E53.8 VITAMIN B 12 DEFICIENCY: ICD-10-CM

## 2024-05-16 DIAGNOSIS — E55.9 VITAMIN D DEFICIENCY: Primary | ICD-10-CM

## 2024-05-16 DIAGNOSIS — E53.8 FOLIC ACID DEFICIENCY: ICD-10-CM

## 2024-05-16 RX ORDER — ERGOCALCIFEROL 1.25 MG/1
50000 CAPSULE ORAL WEEKLY
Qty: 12 CAPSULE | Refills: 0 | Status: SHIPPED | OUTPATIENT
Start: 2024-05-16

## 2024-05-16 NOTE — RESULT ENCOUNTER NOTE
Please notify patient: She does have deficits of vitamin D, B12 and folic acid, could be due to either malnutrition not eating enough foods with those nutrients or malabsorption from her bowels, to follow-up with Dr. Glez, to rule out celiac disease or any other causes of malabsorption  Celiac panel was never checked, I can order celiac panel is an additional blood work she can do    She needs B12 shots, B12 is still low  Needs 1000 mcg B 12 im daily x 7, then weekly x 4, then monthly for life     Needs to set up the B12 shots      Also not immune against chickenpox, hepatitis A&B.  Due to having hepatitis C, she needs those vaccines all 3 of them 1 at the time  Please schedule for what we have in the office, could get at the same time she gets the B12      Future Appointments  9/18/2024  2:15 PM    Chitra Glez MD         [facility]  11/13/2024 1:30 PM    Jailyn Fung MD    [facility]

## 2024-05-20 ENCOUNTER — TELEPHONE (OUTPATIENT)
Dept: FAMILY MEDICINE CLINIC | Age: 34
End: 2024-05-20

## 2024-05-20 ENCOUNTER — NURSE ONLY (OUTPATIENT)
Dept: FAMILY MEDICINE CLINIC | Age: 34
End: 2024-05-20
Payer: MEDICAID

## 2024-05-20 DIAGNOSIS — E53.8 VITAMIN B 12 DEFICIENCY: Primary | ICD-10-CM

## 2024-05-20 DIAGNOSIS — B37.31 YEAST VAGINITIS: Primary | ICD-10-CM

## 2024-05-20 PROCEDURE — 96372 THER/PROPH/DIAG INJ SC/IM: CPT | Performed by: FAMILY MEDICINE

## 2024-05-20 RX ORDER — CYANOCOBALAMIN 1000 UG/ML
1000 INJECTION, SOLUTION INTRAMUSCULAR; SUBCUTANEOUS ONCE
Status: COMPLETED | OUTPATIENT
Start: 2024-05-20 | End: 2024-05-20

## 2024-05-20 RX ORDER — FLUCONAZOLE 150 MG/1
150 TABLET ORAL
Qty: 3 TABLET | Refills: 0 | Status: SHIPPED | OUTPATIENT
Start: 2024-05-20

## 2024-05-20 RX ADMIN — CYANOCOBALAMIN 1000 MCG: 1000 INJECTION, SOLUTION INTRAMUSCULAR; SUBCUTANEOUS at 15:57

## 2024-05-20 NOTE — PROGRESS NOTES
Diagnosis Orders   1. Vitamin B 12 deficiency  cyanocobalamin injection 1,000 mcg           Future Appointments   Date Time Provider Department Center   5/21/2024 11:15 AM SCHEDULE, [facility]   5/22/2024  3:30 PM SCHEDULE, [facility]   5/23/2024 11:15 AM SCHEDULE, [facility]   5/24/2024  2:00 PM SCHEDULE, [facility]   5/28/2024 11:15 AM SCHEDULE, [facility]   5/29/2024  3:30 PM SCHEDULE, [facility]   9/18/2024  2:15 PM Chitra Glez MD [facility]   11/13/2024  1:30 PM Jailyn Fung MD [facility]

## 2024-05-20 NOTE — TELEPHONE ENCOUNTER
Please let the patient know to  prescription from the pharmacy.      Orders Placed This Encounter   Medications    fluconazole (DIFLUCAN) 150 MG tablet     Sig: Take 1 tablet by mouth every 72 hours     Dispense:  3 tablet     Refill:  0         [facility]/pharmacy #[id number] - [place], [place] - [address] - P [phone] - F [phone]  [address] [place] [address]  Phone: [phone] Fax: [phone]      No orders of the defined types were placed in this encounter.      Future Appointments   Date Time Provider Department Center   5/21/2024 11:15 AM SCHEDULE, [facility]   5/22/2024  3:30 PM SCHEDULE, [facility]   5/23/2024 11:15 AM SCHEDULE, [facility]   5/24/2024  2:00 PM SCHEDULE, [facility]   5/28/2024 11:15 AM SCHEDULE, [facility]   5/29/2024  3:30 PM SCHEDULE, [facility]   9/18/2024  2:15 PM Chitra Glez MD [facility]   11/13/2024  1:30 PM Jailyn Fung MD [facility]       Thank you!

## 2024-05-20 NOTE — TELEPHONE ENCOUNTER
Incoming call came from Patient, in regard to having onset current symptoms of YEAST INFECTION .   That have been ongoing for the past 5-7 days. Due to our availability of schedule, nothing is available today to offer telemedicine visit with provider.        Patient does have MY-CHART ACCESS     Recommended disposition:  SHE DECLINES TO PURCHASE ANYTHING OVER THE COUNTER AND SHE IS ALSO ASKING IS SOMETHING CAN JUST BE CALLED IN AND STATED THAT SHE FINISHED STEROIDS AND ANTIBIOTIC. THAT CAUSED SYMPTOMS OF YEAST INFECTION       Future Appointments   Date Time Provider Department Center   5/21/2024 11:15 AM SCHEDULE, [facility]   5/22/2024  3:30 PM SCHEDULE, [facility]   5/23/2024 11:15 AM SCHEDULE, [facility]   5/24/2024  2:00 PM SCHEDULE, [facility]   5/28/2024 11:15 AM SCHEDULE, [facility]   5/29/2024  3:30 PM SCHEDULE, [facility]   9/18/2024  2:15 PM Chitra Glez MD [facility]   11/13/2024  1:30 PM Jailyn Fung MD  sc [facility]        Please Approve or Refuse.  Send to Pharmacy per Pt's Request: [facility]      Next Visit Date:  5/21/2024   Last Visit Date: 5/13/2024    Hemoglobin A1C (%)   Date Value   05/13/2024 3.9 (L)             ( goal A1C is < 7)   BP Readings from Last 3 Encounters:   05/13/24 110/70   12/05/23 120/80   07/28/23 94/74          (goal 120/80)  BUN   Date Value Ref Range Status   05/13/2024 12 6 - 20 mg/dL Final     Creatinine   Date Value Ref Range Status   05/13/2024 0.9 0.5 - 0.9 mg/dL Final     Potassium   Date Value Ref Range Status   05/13/2024 3.6 (L) 3.7 - 5.3 mmol/L Final

## 2024-05-21 ENCOUNTER — NURSE ONLY (OUTPATIENT)
Dept: FAMILY MEDICINE CLINIC | Age: 34
End: 2024-05-21
Payer: MEDICAID

## 2024-05-21 DIAGNOSIS — E53.8 VITAMIN B 12 DEFICIENCY: Primary | ICD-10-CM

## 2024-05-21 PROCEDURE — 96372 THER/PROPH/DIAG INJ SC/IM: CPT | Performed by: FAMILY MEDICINE

## 2024-05-21 RX ORDER — CYANOCOBALAMIN 1000 UG/ML
1000 INJECTION, SOLUTION INTRAMUSCULAR; SUBCUTANEOUS ONCE
Status: COMPLETED | OUTPATIENT
Start: 2024-05-21 | End: 2024-05-21

## 2024-05-21 RX ADMIN — CYANOCOBALAMIN 1000 MCG: 1000 INJECTION, SOLUTION INTRAMUSCULAR; SUBCUTANEOUS at 13:24

## 2024-05-21 NOTE — PROGRESS NOTES
INJECTIONS GIVEN. TOLERATED WELL, NO ADVERSE REACTIONS NOTED.  
3 y/o F pt brought in by mother. Mother brought pt to pediatrician for "balls" on neck and was negative for infection. They told mother to see surgical oncology at [facility]. Mother called [facility] and no one answered and she was then advised to go to ED. Pt had ear infection 1 month ago.  Dr. Guzmán

## 2024-05-22 ENCOUNTER — NURSE ONLY (OUTPATIENT)
Dept: FAMILY MEDICINE CLINIC | Age: 34
End: 2024-05-22
Payer: MEDICAID

## 2024-05-22 DIAGNOSIS — E53.8 VITAMIN B 12 DEFICIENCY: Primary | ICD-10-CM

## 2024-05-22 PROCEDURE — 96372 THER/PROPH/DIAG INJ SC/IM: CPT | Performed by: FAMILY MEDICINE

## 2024-05-22 RX ORDER — CYANOCOBALAMIN 1000 UG/ML
1000 INJECTION, SOLUTION INTRAMUSCULAR; SUBCUTANEOUS ONCE
Status: COMPLETED | OUTPATIENT
Start: 2024-05-22 | End: 2024-05-22

## 2024-05-22 RX ADMIN — CYANOCOBALAMIN 1000 MCG: 1000 INJECTION, SOLUTION INTRAMUSCULAR; SUBCUTANEOUS at 15:54

## 2024-05-24 ENCOUNTER — PATIENT MESSAGE (OUTPATIENT)
Dept: FAMILY MEDICINE CLINIC | Age: 34
End: 2024-05-24

## 2024-05-24 ENCOUNTER — NURSE ONLY (OUTPATIENT)
Dept: FAMILY MEDICINE CLINIC | Age: 34
End: 2024-05-24
Payer: MEDICAID

## 2024-05-24 VITALS — BODY MASS INDEX: 30.54 KG/M2 | WEIGHT: 178 LBS

## 2024-05-24 DIAGNOSIS — K59.01 SLOW TRANSIT CONSTIPATION: Primary | ICD-10-CM

## 2024-05-24 DIAGNOSIS — E53.8 VITAMIN B 12 DEFICIENCY: Primary | ICD-10-CM

## 2024-05-24 PROCEDURE — 96372 THER/PROPH/DIAG INJ SC/IM: CPT | Performed by: FAMILY MEDICINE

## 2024-05-24 RX ORDER — CYANOCOBALAMIN 1000 UG/ML
1000 INJECTION, SOLUTION INTRAMUSCULAR; SUBCUTANEOUS ONCE
Status: COMPLETED | OUTPATIENT
Start: 2024-05-24 | End: 2024-05-24

## 2024-05-24 RX ORDER — POLYETHYLENE GLYCOL 3350 17 G/17G
17 POWDER, FOR SOLUTION ORAL DAILY
Qty: 238 G | Refills: 3 | Status: SHIPPED | OUTPATIENT
Start: 2024-05-24

## 2024-05-24 RX ADMIN — CYANOCOBALAMIN 1000 MCG: 1000 INJECTION, SOLUTION INTRAMUSCULAR; SUBCUTANEOUS at 14:41

## 2024-05-24 NOTE — TELEPHONE ENCOUNTER
From: Phyllis Neville  To: Dr. Jailyn Fung  Sent: 5/24/2024 2:39 PM EDT  Subject: Constipation/abnormal bowel movement    This was my bowel movement this morning which doesn’t look normal and I had to reschedule my GI appointment due to having shingles which is not until September I believe so should I wait until then or is this something to where I need to be seen sooner?

## 2024-05-28 ENCOUNTER — NURSE ONLY (OUTPATIENT)
Dept: FAMILY MEDICINE CLINIC | Age: 34
End: 2024-05-28
Payer: MEDICAID

## 2024-05-28 DIAGNOSIS — E53.8 VITAMIN B 12 DEFICIENCY: Primary | ICD-10-CM

## 2024-05-28 PROCEDURE — 96372 THER/PROPH/DIAG INJ SC/IM: CPT | Performed by: FAMILY MEDICINE

## 2024-05-28 RX ORDER — CYANOCOBALAMIN 1000 UG/ML
1000 INJECTION, SOLUTION INTRAMUSCULAR; SUBCUTANEOUS ONCE
Status: COMPLETED | OUTPATIENT
Start: 2024-05-28 | End: 2024-05-28

## 2024-05-28 RX ADMIN — CYANOCOBALAMIN 1000 MCG: 1000 INJECTION, SOLUTION INTRAMUSCULAR; SUBCUTANEOUS at 12:47

## 2024-05-28 NOTE — PROGRESS NOTES
Diagnosis Orders   1. Vitamin B 12 deficiency  cyanocobalamin injection 1,000 mcg           Future Appointments   Date Time Provider Department Center   5/29/2024  3:30 PM SCHEDULE, ZEINAB SANCHEZ [facility]   5/31/2024  2:15 PM [facility]   9/13/2024 11:15 AM Marcos Cash MD [facility]   9/18/2024  2:15 PM [facility]   11/13/2024  1:30 PM Jailyn Fung MD [facility]

## 2024-05-29 ENCOUNTER — NURSE ONLY (OUTPATIENT)
Dept: FAMILY MEDICINE CLINIC | Age: 34
End: 2024-05-29
Payer: MEDICAID

## 2024-05-29 VITALS — WEIGHT: 178 LBS | BODY MASS INDEX: 30.54 KG/M2

## 2024-05-29 DIAGNOSIS — E53.8 VITAMIN B 12 DEFICIENCY: Primary | ICD-10-CM

## 2024-05-29 PROCEDURE — 96372 THER/PROPH/DIAG INJ SC/IM: CPT | Performed by: FAMILY MEDICINE

## 2024-05-29 RX ORDER — CYANOCOBALAMIN 1000 UG/ML
1000 INJECTION, SOLUTION INTRAMUSCULAR; SUBCUTANEOUS ONCE
Status: COMPLETED | OUTPATIENT
Start: 2024-05-29 | End: 2024-05-29

## 2024-05-29 RX ADMIN — CYANOCOBALAMIN 1000 MCG: 1000 INJECTION, SOLUTION INTRAMUSCULAR; SUBCUTANEOUS at 15:07

## 2024-05-29 NOTE — PROGRESS NOTES
Diagnosis Orders   1. Vitamin B 12 deficiency  cyanocobalamin injection 1,000 mcg           Future Appointments   Date Time Provider Department Center   5/29/2024  3:30 PM Jailyn Fung MD [facility]   5/31/2024  2:15 PM SCHEDULE, [facility]   9/13/2024 11:15 AM Marcos Cash MD [facility]   9/18/2024  2:15 PM [facility]   11/13/2024  1:30 PM Jailyn Fung MD [facility]

## 2024-06-05 DIAGNOSIS — R11.0 NAUSEA: ICD-10-CM

## 2024-06-05 DIAGNOSIS — L70.0 ACNE VULGARIS: ICD-10-CM

## 2024-06-05 RX ORDER — BENZOYL PEROXIDE 10 G/100G
SUSPENSION TOPICAL
Qty: 227 G | Refills: 0 | Status: SHIPPED | OUTPATIENT
Start: 2024-06-05

## 2024-06-05 RX ORDER — ONDANSETRON 4 MG/1
4 TABLET, ORALLY DISINTEGRATING ORAL 3 TIMES DAILY PRN
Qty: 21 TABLET | Refills: 0 | Status: SHIPPED | OUTPATIENT
Start: 2024-06-05

## 2024-06-05 RX ORDER — CLINDAMYCIN AND BENZOYL PEROXIDE 10; 50 MG/G; MG/G
GEL TOPICAL
Qty: 120 G | Refills: 1 | Status: SHIPPED | OUTPATIENT
Start: 2024-06-05

## 2024-06-19 DIAGNOSIS — E55.9 VITAMIN D DEFICIENCY: ICD-10-CM

## 2024-06-19 DIAGNOSIS — E83.42 HYPOMAGNESEMIA: ICD-10-CM

## 2024-06-19 RX ORDER — PNV NO.95/FERROUS FUM/FOLIC AC 28MG-0.8MG
1 TABLET ORAL DAILY
Qty: 90 TABLET | Refills: 1 | Status: SHIPPED | OUTPATIENT
Start: 2024-06-19

## 2024-06-19 RX ORDER — ERGOCALCIFEROL 1.25 MG/1
50000 CAPSULE ORAL WEEKLY
Qty: 12 CAPSULE | Refills: 0 | Status: SHIPPED | OUTPATIENT
Start: 2024-06-19

## 2024-06-19 NOTE — TELEPHONE ENCOUNTER
Please Approve or Refuse.  Send to Pharmacy per Pt's Request:      Next Visit Date:  11/13/2024   Last Visit Date: 5/13/2024    Hemoglobin A1C (%)   Date Value   05/13/2024 3.9 (L)             ( goal A1C is < 7)   BP Readings from Last 3 Encounters:   05/13/24 110/70   12/05/23 120/80   07/28/23 94/74          (goal 120/80)  BUN   Date Value Ref Range Status   05/13/2024 12 6 - 20 mg/dL Final     Creatinine   Date Value Ref Range Status   05/13/2024 0.9 0.5 - 0.9 mg/dL Final     Potassium   Date Value Ref Range Status   05/13/2024 3.6 (L) 3.7 - 5.3 mmol/L Final

## 2024-08-14 ENCOUNTER — TELEPHONE (OUTPATIENT)
Dept: OBGYN CLINIC | Age: 34
End: 2024-08-14

## 2024-08-14 DIAGNOSIS — B37.31 YEAST VAGINITIS: ICD-10-CM

## 2024-08-14 DIAGNOSIS — E83.42 HYPOMAGNESEMIA: ICD-10-CM

## 2024-08-14 DIAGNOSIS — E53.8 VITAMIN B 12 DEFICIENCY: ICD-10-CM

## 2024-08-14 DIAGNOSIS — E55.9 VITAMIN D DEFICIENCY: ICD-10-CM

## 2024-08-14 DIAGNOSIS — E53.8 LOW FOLATE: ICD-10-CM

## 2024-08-14 DIAGNOSIS — E87.6 HYPOKALEMIA: ICD-10-CM

## 2024-08-14 RX ORDER — POTASSIUM CHLORIDE 750 MG/1
10 TABLET, EXTENDED RELEASE ORAL DAILY
Qty: 30 TABLET | Refills: 0 | Status: SHIPPED | OUTPATIENT
Start: 2024-08-14 | End: 2024-09-13

## 2024-08-14 RX ORDER — FOLIC ACID 1 MG/1
1 TABLET ORAL DAILY
Qty: 90 TABLET | Refills: 1 | Status: CANCELLED | OUTPATIENT
Start: 2024-08-14

## 2024-08-14 RX ORDER — PNV NO.95/FERROUS FUM/FOLIC AC 28MG-0.8MG
1 TABLET ORAL DAILY
Qty: 90 TABLET | Refills: 1 | Status: CANCELLED | OUTPATIENT
Start: 2024-08-14

## 2024-08-14 RX ORDER — FLUCONAZOLE 150 MG/1
150 TABLET ORAL
Qty: 3 TABLET | Refills: 0 | Status: SHIPPED | OUTPATIENT
Start: 2024-08-14

## 2024-08-14 RX ORDER — FOLIC ACID 1 MG/1
1 TABLET ORAL DAILY
Qty: 90 TABLET | Refills: 1 | Status: SHIPPED | OUTPATIENT
Start: 2024-08-14

## 2024-08-14 RX ORDER — POTASSIUM CHLORIDE 750 MG/1
10 TABLET, EXTENDED RELEASE ORAL DAILY
Qty: 30 TABLET | Refills: 0 | Status: CANCELLED | OUTPATIENT
Start: 2024-08-14 | End: 2024-09-13

## 2024-08-14 RX ORDER — ERGOCALCIFEROL 1.25 MG/1
50000 CAPSULE ORAL WEEKLY
Qty: 12 CAPSULE | Refills: 0 | Status: SHIPPED | OUTPATIENT
Start: 2024-08-14

## 2024-08-14 RX ORDER — ETONOGESTREL AND ETHINYL ESTRADIOL VAGINAL RING .015; .12 MG/D; MG/D
1 RING VAGINAL
Qty: 3 EACH | Refills: 0 | Status: SHIPPED | OUTPATIENT
Start: 2024-08-14

## 2024-08-14 RX ORDER — PNV NO.95/FERROUS FUM/FOLIC AC 28MG-0.8MG
1 TABLET ORAL DAILY
Qty: 90 TABLET | Refills: 1 | Status: SHIPPED | OUTPATIENT
Start: 2024-08-14

## 2024-08-14 NOTE — TELEPHONE ENCOUNTER
Please Approve or Refuse.  Send to Pharmacy per Pt's Request:      Next Visit Date:  8/14/2024   Last Visit Date: 5/13/2024    Hemoglobin A1C (%)   Date Value   05/13/2024 3.9 (L)             ( goal A1C is < 7)   BP Readings from Last 3 Encounters:   05/13/24 110/70   12/05/23 120/80   07/28/23 94/74          (goal 120/80)  BUN   Date Value Ref Range Status   05/13/2024 12 6 - 20 mg/dL Final     Creatinine   Date Value Ref Range Status   05/13/2024 0.9 0.5 - 0.9 mg/dL Final     Potassium   Date Value Ref Range Status   05/13/2024 3.6 (L) 3.7 - 5.3 mmol/L Final

## 2024-09-10 DIAGNOSIS — E87.6 HYPOKALEMIA: ICD-10-CM

## 2024-09-10 RX ORDER — POTASSIUM CHLORIDE 750 MG/1
10 TABLET, EXTENDED RELEASE ORAL DAILY
Qty: 30 TABLET | Refills: 3 | Status: SHIPPED | OUTPATIENT
Start: 2024-09-10

## 2024-09-30 ENCOUNTER — HOSPITAL ENCOUNTER (OUTPATIENT)
Age: 34
Discharge: HOME | End: 2024-09-30
Payer: MEDICAID

## 2024-09-30 ENCOUNTER — OFFICE VISIT (OUTPATIENT)
Dept: GASTROENTEROLOGY | Age: 34
End: 2024-09-30
Payer: MEDICAID

## 2024-09-30 VITALS
HEIGHT: 64 IN | DIASTOLIC BLOOD PRESSURE: 72 MMHG | BODY MASS INDEX: 30.39 KG/M2 | HEART RATE: 80 BPM | WEIGHT: 178 LBS | SYSTOLIC BLOOD PRESSURE: 116 MMHG

## 2024-09-30 DIAGNOSIS — B18.2 CHRONIC HEPATITIS C WITHOUT HEPATIC COMA (HCC): Primary | ICD-10-CM

## 2024-09-30 DIAGNOSIS — F12.90 MARIJUANA USE: ICD-10-CM

## 2024-09-30 DIAGNOSIS — R11.0 NAUSEA: ICD-10-CM

## 2024-09-30 DIAGNOSIS — K58.2 IRRITABLE BOWEL SYNDROME WITH BOTH CONSTIPATION AND DIARRHEA: ICD-10-CM

## 2024-09-30 LAB
AFP SERPL-MCNC: <1.8 UG/L
ALBUMIN SERPL-MCNC: 4.5 G/DL (ref 3.5–5.2)
ALP SERPL-CCNC: 54 U/L (ref 35–104)
ALT SERPL-CCNC: 11 U/L (ref 5–33)
AMMONIA PLAS-SCNC: 35 UMOL/L (ref 11–51)
ANION GAP SERPL CALCULATED.3IONS-SCNC: 9 MMOL/L (ref 9–17)
AST SERPL-CCNC: 14 U/L
BILIRUB DIRECT SERPL-MCNC: 0.1 MG/DL
BILIRUB INDIRECT SERPL-MCNC: 0.4 MG/DL (ref 0–1)
BILIRUB SERPL-MCNC: 0.5 MG/DL (ref 0.3–1.2)
BUN SERPL-MCNC: 12 MG/DL (ref 6–20)
CERULOPLASMIN SERPL-MCNC: 44 MG/DL (ref 16–45)
CHLORIDE SERPL-SCNC: 104 MMOL/L (ref 98–107)
CO2 SERPL-SCNC: 26 MMOL/L (ref 20–31)
CREAT SERPL-MCNC: 0.9 MG/DL (ref 0.5–0.9)
FERRITIN SERPL-MCNC: 60 NG/ML (ref 13–150)
GFR, ESTIMATED: 86 ML/MIN/1.73M2
POTASSIUM SERPL-SCNC: 4.5 MMOL/L (ref 3.7–5.3)
PROT SERPL-MCNC: 7.5 G/DL (ref 6.4–8.3)
SODIUM SERPL-SCNC: 139 MMOL/L (ref 135–144)
TRANSFERRIN SERPL-MCNC: 306 MG/DL (ref 200–360)

## 2024-09-30 PROCEDURE — 87902 NFCT AGT GNTYP ALYS HEP C: CPT

## 2024-09-30 PROCEDURE — 86225 DNA ANTIBODY NATIVE: CPT

## 2024-09-30 PROCEDURE — 83516 IMMUNOASSAY NONANTIBODY: CPT

## 2024-09-30 PROCEDURE — 80051 ELECTROLYTE PANEL: CPT

## 2024-09-30 PROCEDURE — 82728 ASSAY OF FERRITIN: CPT

## 2024-09-30 PROCEDURE — 82105 ALPHA-FETOPROTEIN SERUM: CPT

## 2024-09-30 PROCEDURE — 87522 HEPATITIS C REVRS TRNSCRPJ: CPT

## 2024-09-30 PROCEDURE — 84466 ASSAY OF TRANSFERRIN: CPT

## 2024-09-30 PROCEDURE — 82140 ASSAY OF AMMONIA: CPT

## 2024-09-30 PROCEDURE — 86038 ANTINUCLEAR ANTIBODIES: CPT

## 2024-09-30 PROCEDURE — 82390 ASSAY OF CERULOPLASMIN: CPT

## 2024-09-30 PROCEDURE — 80076 HEPATIC FUNCTION PANEL: CPT

## 2024-09-30 PROCEDURE — 99204 OFFICE O/P NEW MOD 45 MIN: CPT | Performed by: INTERNAL MEDICINE

## 2024-09-30 PROCEDURE — 84520 ASSAY OF UREA NITROGEN: CPT

## 2024-09-30 PROCEDURE — 82565 ASSAY OF CREATININE: CPT

## 2024-09-30 PROCEDURE — 36415 COLL VENOUS BLD VENIPUNCTURE: CPT

## 2024-09-30 ASSESSMENT — ENCOUNTER SYMPTOMS
RECTAL PAIN: 0
BLOOD IN STOOL: 0
VOMITING: 0
DIARRHEA: 1
ABDOMINAL DISTENTION: 1
CONSTIPATION: 1
ANAL BLEEDING: 0
ABDOMINAL PAIN: 1
NAUSEA: 1

## 2024-09-30 NOTE — PROGRESS NOTES
nicotine and chocolate. Pt was also told to stay away from any kind of fast foods, soda pops. She was also advised to avoid lots of spices, grease and fried food etc.     Instructions were also given about trying to arrange the timing, quality and quantity of food.    Instructions were given about using ample amount of fiber including dietary and supplemental fiber either metamucil, bennafiber or citrucell etc.  Pt was advised about drinking ample amount of water without any colors or chemicals. Stress was given about regular exercise.    Pt has verbalized understanding and agreement to these modifications.    More than half of patient's clinic visit time was spent in counseling about lifestyle and dietary modifications  Patient's  questions were answered in this regard as well  The patient has verbalized understanding and agreement         Thank you for allowing me to participate in the care of Ms. Neville. For any further questions please do not hesitate to contact me.         I have reviewed and agree with the ROS entered by the MA/Nurse.              Chitra Glez MD, FACG    Board Certified in Gastroenterology and Internal Medicine    [facility]    Office #: [phone]                            Thank you for allowing me to participate in the care of Ms. Neville. For any further questions please do not hesitate to contact me.         I have reviewed and agree with the ROS entered by the MA/Nurse.              Chitra Glez MD, FACG    Board Certified in Gastroenterology and Internal Medicine    [facility]    Office #: [phone]         This note is created with the assistance of the speech recognition program.  While intending to generate a document that actually reflects the content of the visit, document can still have some errors including those of syntax and sound like substitutions which may escape proof reading.  Actual meaning can be extrapolated by

## 2024-10-01 LAB — MITOCHONDRIA M2 IGG SER-ACNC: 0.7 U/ML (ref 0–4)

## 2024-10-01 NOTE — RESULT ENCOUNTER NOTE
Management per GI, seen yesterday by GI as a new patient for hepatitis C  Mild decreased kidney function GFR 86, mild kidney disease stage II, avoid ibuprofen, naproxen, Aleve, Motrin  Other labs here within normal limits  Pending hep C RNA and smooth muscle antibody      Future Appointments  11/13/2024 1:30 PM    Jailyn Fung MD    fp sc               [facility]

## 2024-10-02 LAB
ANA SER QL IA: NEGATIVE
DSDNA IGG SER QL IA: 0.8 IU/ML
NUCLEAR IGG SER IA-RTO: 0.4 U/ML
SMOOTH MUSCLE ANTIBODY: 5 UNITS (ref 0–19)

## 2024-10-03 LAB
HCV RNA # SERPL NAA+PROBE: NOT DETECTED {COPIES}/ML
SPECIMEN SOURCE: NORMAL

## 2024-10-04 LAB — HCV GENTYP SERPL NAA+PROBE: NORMAL

## 2024-10-15 ENCOUNTER — HOSPITAL ENCOUNTER (OUTPATIENT)
Dept: ULTRASOUND IMAGING | Age: 34
Discharge: HOME | End: 2024-10-17
Payer: MEDICAID

## 2024-10-15 DIAGNOSIS — R11.0 NAUSEA: ICD-10-CM

## 2024-10-15 DIAGNOSIS — K58.2 IRRITABLE BOWEL SYNDROME WITH BOTH CONSTIPATION AND DIARRHEA: ICD-10-CM

## 2024-10-15 DIAGNOSIS — B18.2 CHRONIC HEPATITIS C WITHOUT HEPATIC COMA (HCC): ICD-10-CM

## 2024-10-15 DIAGNOSIS — B18.2 HEP C W/O COMA, CHRONIC (HCC): ICD-10-CM

## 2024-10-15 PROCEDURE — 76981 USE PARENCHYMA: CPT

## 2024-10-15 PROCEDURE — 76705 ECHO EXAM OF ABDOMEN: CPT

## 2024-10-16 NOTE — RESULT ENCOUNTER NOTE
Please notify patient: To make appointment to discuss all the results with Dr. Glez   Ultrasound does show stiffness of the liver and changes consistent with hepatitis C    Patient does need both hepatitis A and hepatitis B vaccines, please schedule her for nurse visit  Future Appointments  11/13/2024 1:30 PM    Jailyn Fung MD    fp sc               [facility]  1/2/2025   5:30 PM    [facility] ULTRASOUND       [facility] US             [facility] Radiolog

## 2024-10-16 NOTE — RESULT ENCOUNTER NOTE
Please notify patient: Ultrasound of the liver does show some stiffness of the liver and scar tissue in the liver, likely related to hepatitis C, to make appointment with her GI doctor to discuss all the reports,  Absolutely avoid any alcoholic beverages, pop, and not to take ibuprofen too often      Future Appointments  11/13/2024 1:30 PM    Jailyn Fung MD    fp sc               [facility]  1/2/2025   5:30 PM    [facility] ULTRASOUND       [facility] US             [facility] Radiolog

## 2024-10-28 RX ORDER — ETONOGESTREL AND ETHINYL ESTRADIOL .12; .015 MG/D; MG/D
RING VAGINAL
Qty: 1 EACH | Refills: 0 | Status: SHIPPED | OUTPATIENT
Start: 2024-10-28

## 2024-11-11 PROBLEM — M79.10 MUSCLE PAIN: Status: ACTIVE | Noted: 2024-05-08

## 2024-11-11 PROBLEM — F13.20 BENZODIAZEPINE DEPENDENCE (HCC): Status: ACTIVE | Noted: 2023-05-14

## 2024-11-11 PROBLEM — F13.21: Status: ACTIVE | Noted: 2024-07-19

## 2024-11-11 PROBLEM — R41.82 ALTERED MENTAL STATUS: Status: ACTIVE | Noted: 2023-01-08

## 2024-11-13 ENCOUNTER — OFFICE VISIT (OUTPATIENT)
Dept: FAMILY MEDICINE CLINIC | Age: 34
End: 2024-11-13

## 2024-11-13 DIAGNOSIS — B18.2 CHRONIC HEPATITIS C WITHOUT HEPATIC COMA (HCC): Primary | ICD-10-CM

## 2024-11-13 DIAGNOSIS — E53.8 VITAMIN B 12 DEFICIENCY: ICD-10-CM

## 2024-11-13 DIAGNOSIS — R56.9 SEIZURE (HCC): ICD-10-CM

## 2024-11-13 DIAGNOSIS — F31.30 BIPOLAR I DISORDER, MOST RECENT EPISODE DEPRESSED (HCC): ICD-10-CM

## 2024-11-13 DIAGNOSIS — K04.7 TOOTH INFECTION: ICD-10-CM

## 2024-11-13 DIAGNOSIS — E53.8 LOW FOLATE: ICD-10-CM

## 2024-11-13 DIAGNOSIS — D69.6 THROMBOCYTOPENIA (HCC): ICD-10-CM

## 2024-11-13 DIAGNOSIS — Z23 ENCOUNTER FOR IMMUNIZATION: ICD-10-CM

## 2024-11-13 DIAGNOSIS — E55.9 VITAMIN D DEFICIENCY: ICD-10-CM

## 2024-11-13 DIAGNOSIS — R01.1 MURMUR, CARDIAC: ICD-10-CM

## 2024-11-13 DIAGNOSIS — K59.01 SLOW TRANSIT CONSTIPATION: ICD-10-CM

## 2024-11-13 DIAGNOSIS — Z86.74 HISTORY OF CARDIAC ARREST: ICD-10-CM

## 2024-11-13 PROBLEM — F13.21: Status: RESOLVED | Noted: 2024-07-19 | Resolved: 2024-11-13

## 2024-11-13 PROBLEM — F11.988 OPIOID USE, UNSPECIFIED WITH OTHER OPIOID-INDUCED DISORDER (HCC): Status: RESOLVED | Noted: 2021-08-12 | Resolved: 2024-11-13

## 2024-11-13 PROBLEM — F13.20 BENZODIAZEPINE DEPENDENCE (HCC): Status: RESOLVED | Noted: 2023-05-14 | Resolved: 2024-11-13

## 2024-11-13 PROBLEM — F14.20 COCAINE DEPENDENCE (HCC): Status: RESOLVED | Noted: 2018-05-10 | Resolved: 2024-11-13

## 2024-11-13 RX ORDER — FOLIC ACID 1 MG/1
1 TABLET ORAL DAILY
Qty: 90 TABLET | Refills: 1 | Status: SHIPPED | OUTPATIENT
Start: 2024-11-13

## 2024-11-13 RX ORDER — CLINDAMYCIN HYDROCHLORIDE 300 MG/1
300 CAPSULE ORAL 3 TIMES DAILY
Qty: 21 CAPSULE | Refills: 0 | Status: SHIPPED | OUTPATIENT
Start: 2024-11-13 | End: 2024-11-20

## 2024-11-13 RX ORDER — DOCUSATE SODIUM 100 MG/1
100 CAPSULE, LIQUID FILLED ORAL 2 TIMES DAILY
Qty: 180 CAPSULE | Refills: 3 | Status: SHIPPED | OUTPATIENT
Start: 2024-11-13

## 2024-11-13 RX ORDER — CYANOCOBALAMIN 1000 UG/ML
1000 INJECTION, SOLUTION INTRAMUSCULAR; SUBCUTANEOUS ONCE
Status: COMPLETED | OUTPATIENT
Start: 2024-11-13 | End: 2024-11-13

## 2024-11-13 RX ORDER — ERGOCALCIFEROL 1.25 MG/1
50000 CAPSULE, LIQUID FILLED ORAL WEEKLY
Qty: 12 CAPSULE | Refills: 0 | Status: SHIPPED | OUTPATIENT
Start: 2024-11-13

## 2024-11-13 RX ADMIN — CYANOCOBALAMIN 1000 MCG: 1000 INJECTION, SOLUTION INTRAMUSCULAR; SUBCUTANEOUS at 14:29

## 2024-11-13 SDOH — ECONOMIC STABILITY: FOOD INSECURITY: WITHIN THE PAST 12 MONTHS, YOU WORRIED THAT YOUR FOOD WOULD RUN OUT BEFORE YOU GOT MONEY TO BUY MORE.: NEVER TRUE

## 2024-11-13 SDOH — ECONOMIC STABILITY: FOOD INSECURITY: WITHIN THE PAST 12 MONTHS, THE FOOD YOU BOUGHT JUST DIDN'T LAST AND YOU DIDN'T HAVE MONEY TO GET MORE.: NEVER TRUE

## 2024-11-13 SDOH — ECONOMIC STABILITY: INCOME INSECURITY: HOW HARD IS IT FOR YOU TO PAY FOR THE VERY BASICS LIKE FOOD, HOUSING, MEDICAL CARE, AND HEATING?: NOT HARD AT ALL

## 2024-11-13 ASSESSMENT — PATIENT HEALTH QUESTIONNAIRE - PHQ9
SUM OF ALL RESPONSES TO PHQ QUESTIONS 1-9: 4
7. TROUBLE CONCENTRATING ON THINGS, SUCH AS READING THE NEWSPAPER OR WATCHING TELEVISION: MORE THAN HALF THE DAYS
6. FEELING BAD ABOUT YOURSELF - OR THAT YOU ARE A FAILURE OR HAVE LET YOURSELF OR YOUR FAMILY DOWN: NOT AT ALL
SUM OF ALL RESPONSES TO PHQ9 QUESTIONS 1 & 2: 0
8. MOVING OR SPEAKING SO SLOWLY THAT OTHER PEOPLE COULD HAVE NOTICED. OR THE OPPOSITE, BEING SO FIGETY OR RESTLESS THAT YOU HAVE BEEN MOVING AROUND A LOT MORE THAN USUAL: NOT AT ALL
3. TROUBLE FALLING OR STAYING ASLEEP: NOT AT ALL
5. POOR APPETITE OR OVEREATING: NOT AT ALL
10. IF YOU CHECKED OFF ANY PROBLEMS, HOW DIFFICULT HAVE THESE PROBLEMS MADE IT FOR YOU TO DO YOUR WORK, TAKE CARE OF THINGS AT HOME, OR GET ALONG WITH OTHER PEOPLE: NOT DIFFICULT AT ALL
SUM OF ALL RESPONSES TO PHQ QUESTIONS 1-9: 4
9. THOUGHTS THAT YOU WOULD BE BETTER OFF DEAD, OR OF HURTING YOURSELF: NOT AT ALL
1. LITTLE INTEREST OR PLEASURE IN DOING THINGS: NOT AT ALL
2. FEELING DOWN, DEPRESSED OR HOPELESS: NOT AT ALL
4. FEELING TIRED OR HAVING LITTLE ENERGY: MORE THAN HALF THE DAYS

## 2024-11-13 NOTE — ASSESSMENT & PLAN NOTE
Chronic, stable  Apparently her immunity took care of the hep C RNA  Hep C RNA undetectable on 9/30/2024  Ultrasound of the liver showed mildly coarsened echotexture of the liver and liver stiffness, likely mild fibrosis  Has follow-up appointment with GI  We will immunize against hepatitis A&B  AFP negative  Negative for autoimmune hepatitis  Her liver tests are normal  Lab Results   Component Value Date    ALT 11 09/30/2024    AST 14 09/30/2024    ALKPHOS 54 09/30/2024    BILITOT 0.5 09/30/2024     Absolutely avoid any alcoholic beverages and pop to prevent worsening fibrosis and development of liver cirrhosis  Orders:    Hep B, ENGERIX-B, (age 20 yrs+), IM, 1mL, 3-dose    Hep A, HAVRIX, (age 19 yrs+), IM

## 2024-11-13 NOTE — PROGRESS NOTES
Visit Information    Have you changed or started any medications since your last visit including any over-the-counter medicines, vitamins, or herbal medicines? no   Are you having any side effects from any of your medications? -  no  Have you stopped taking any of your medications? Is so, why? -  no    Have you seen any other physician or provider since your last visit? No  Have you had any other diagnostic tests since your last visit? No  Have you been seen in the emergency room and/or had an admission to a hospital since we last saw you? No  Have you had your routine dental cleaning in the past 6 months? yes     Have you activated your [employer] account? If not, what are your barriers? Yes     Patient Care Team:  Jailyn Fung MD as PCP - General (Family Medicine)  Jailyn Fung MD as PCP - Empaneled Provider  Jani Kearney MD as Consulting Physician (Psychiatry)  Chitra Glez MD as Consulting Physician (Gastroenterology)    Medical History Review  Past Medical, Family, and Social History reviewed and does contribute to the patient presenting condition    Health Maintenance   Topic Date Due    Pneumococcal 0-64 years Vaccine (1 of 2 - PCV) Never done    Varicella vaccine (1 of 2 - 13+ 2-dose series) Never done    Hepatitis A vaccine (1 of 2 - Risk 2-dose series) Never done    Hepatitis B vaccine (1 of 3 - 19+ 3-dose series) Never done    Flu vaccine (1) 08/01/2024    COVID-19 Vaccine (1 - 2023-24 season) Never done    Depression Monitoring  05/13/2025    DTaP/Tdap/Td vaccine (5 - Td or Tdap) 03/23/2027    Cervical cancer screen  07/13/2027    HIV screen  Completed    Hib vaccine  Aged Out    HPV vaccine  Aged Out    Polio vaccine  Aged Out    Meningococcal (ACWY) vaccine  Aged Out    Depression Screen  Discontinued     
4  PHQ-9 Total Score: 4      Prior to Visit Medications    Medication Sig Taking? Authorizing Provider   etonogestrel-ethinyl estradiol (ELURYNG) 0.12-0.015 MG/24HR vaginal ring INSERT ONE (1) RING VAGINALLY AND LEAVE IN PLACE FOR THREE (3) WEEKS, THEN REMOVE FOR ONE (1) WEEK. Yes Yusuf Bland,    KLOR-CON M10 10 MEQ extended release tablet TAKE 1 TABLET BY MOUTH EVERY DAY Yes Jailyn Fung MD   vitamin D (ERGOCALCIFEROL) 1.25 MG (86643 UT) CAPS capsule Take 1 capsule by mouth Once a week at 5 PM Yes Jailyn Fung MD   folic acid (FOLVITE) 1 MG tablet Take 1 tablet by mouth daily Yes Jailyn Fung MD   cyanocobalamin ([facility] VITAMIN B12) 1000 MCG tablet Take 1 tablet by mouth daily Yes Jailyn Fung MD   magnesium oxide (MAG-OX) 250 MG TABS tablet Take 1 tablet by mouth daily Yes Jailyn Fung MD   benzoyl peroxide 10 % external wash Apply topically 2 times daily. Yes Jailyn Fung MD   albuterol sulfate HFA (PROVENTIL HFA) 108 (90 Base) MCG/ACT inhaler Inhale 2 puffs into the lungs every 6 hours as needed for Wheezing or Shortness of Breath Yes Igor Glez MD   gabapentin (NEURONTIN) 300 MG capsule Take 1 capsule by mouth daily. Yes Anthony Montemayor MD   cloNIDine (CATAPRES) 0.1 MG tablet Take 1 tablet by mouth daily Yes Anthony Montemayor MD   amphetamine-dextroamphetamine (ADDERALL) 10 MG tablet Take 1 tablet by mouth 2 times daily. Yes Anthony Montemayor MD   ibuprofen (ADVIL;MOTRIN) 800 MG tablet  Yes Anthony Montemayor MD   SUBOXONE 8-2 MG FILM SL film PLACE 1 STRIP UNDER THE TONGUE TWICE DAILY Yes Anthony Montemayor MD   hydrOXYzine (ATARAX) 25 MG tablet Take 1 tablet by mouth 3 times daily as needed for Anxiety Yes Jani Kearney MD   fluconazole (DIFLUCAN) 150 MG tablet Take 1 tablet by mouth every 72 hours  Patient not taking: Reported on 11/13/2024  Jailyn Fung MD   clindamycin-benzoyl peroxide (BENZACLIN) 1-5 % gel Apply

## 2024-11-13 NOTE — ASSESSMENT & PLAN NOTE
Chronic, severe, she failed to follow-up for B12 injections, restart B12 injections weekly x 4 and then monthly for life    Lab Results   Component Value Date    ACNRXLFK04 <150 (L) 05/13/2024        Orders:    cyanocobalamin injection 1,000 mcg

## 2024-11-13 NOTE — ASSESSMENT & PLAN NOTE
Chronic, mild, intermittent likely related to liver disease, early liver cirrhosis  Platelets 137 on 5/13/2024, 127 on 7/19/2019  We will continue to monitor  Absolutely avoid ibuprofen and alcohol

## 2024-11-13 NOTE — ASSESSMENT & PLAN NOTE
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

## 2024-11-13 NOTE — ASSESSMENT & PLAN NOTE
Chronic, severe, will give another round of high dosage vitamin D, to take with food  Lab Results   Component Value Date    VITD25 24.9 (L) 05/13/2024         Orders:    vitamin D (ERGOCALCIFEROL) 1.25 MG (88409 UT) CAPS capsule; Take 1 capsule by mouth Once a week at 5 PM

## 2024-11-13 NOTE — ASSESSMENT & PLAN NOTE
Chronic, stable  Continue current treatment and follow up with psychiatrist and psychologist as scheduled.  She reports she went to rehab and she does not use benzodiazepines anymore.  She reports she does use THC and she is on Suboxone.  Also on clonidine and hydroxyzine for anxiety  Her psychiatrist is aware that she uses THC  11/11/2024 11/11/2024 1 Gabapentin 300 Mg Capsule 90.00 30 He Cum [id number] [facility] ([id number]) 0  [place]icaid [place]   11/11/2024 11/11/2024 1 Dextroamp-Amphetamin 20 Mg Tab 60.00 30 He Cum [id number] [facility] ([id number]) 0  [place]icaid [place]   11/04/2024 11/04/2024 3 Buprenorphine-Nalox 8-2mg Film 70.00 28 [place]

## 2024-11-13 NOTE — ASSESSMENT & PLAN NOTE
Patient has history of cardiac arrest  Echo 2D 8/22/2019 showed EF 25 to 30%, systolic function severely decreased, right ventricle moderately to severely reduced systolic function, right ventricle moderately dilated  Echo 2D 8/27/2019 showed EF improved to 55 to 60%    Orders:    EKG 12 Lead; Future    Echo (TTE) complete (PRN contrast/bubble/strain/3D); Future

## 2024-11-18 VITALS
DIASTOLIC BLOOD PRESSURE: 70 MMHG | OXYGEN SATURATION: 98 % | HEIGHT: 64 IN | WEIGHT: 176 LBS | BODY MASS INDEX: 30.05 KG/M2 | SYSTOLIC BLOOD PRESSURE: 126 MMHG | HEART RATE: 76 BPM

## 2024-11-18 PROBLEM — R41.82 ALTERED MENTAL STATUS: Status: RESOLVED | Noted: 2023-01-08 | Resolved: 2024-11-18

## 2024-11-18 PROBLEM — N92.6 IRREGULAR BLEEDING: Status: RESOLVED | Noted: 2022-07-13 | Resolved: 2024-11-18

## 2024-11-18 PROBLEM — Z32.02 NEGATIVE PREGNANCY TEST: Status: RESOLVED | Noted: 2022-07-13 | Resolved: 2024-11-18

## 2024-11-18 PROBLEM — R01.1 MURMUR, CARDIAC: Status: ACTIVE | Noted: 2024-11-18

## 2024-11-18 PROBLEM — Z65.3 LOST CUSTODY OF CHILDREN: Status: RESOLVED | Noted: 2017-06-14 | Resolved: 2024-11-18

## 2024-11-18 PROBLEM — R11.0 NAUSEA: Status: RESOLVED | Noted: 2024-09-30 | Resolved: 2024-11-18

## 2024-11-18 PROBLEM — K58.2 IRRITABLE BOWEL SYNDROME WITH BOTH CONSTIPATION AND DIARRHEA: Status: RESOLVED | Noted: 2024-09-30 | Resolved: 2024-11-18

## 2024-11-18 PROBLEM — K59.01 SLOW TRANSIT CONSTIPATION: Status: ACTIVE | Noted: 2024-11-18

## 2024-11-18 PROBLEM — K12.2 SUBLINGUAL ABSCESS: Status: RESOLVED | Noted: 2021-08-05 | Resolved: 2024-11-18

## 2024-11-18 PROBLEM — N93.9 VAGINAL BLEEDING: Status: RESOLVED | Noted: 2023-07-30 | Resolved: 2024-11-18

## 2024-11-18 PROBLEM — R29.898 LUE WEAKNESS: Status: RESOLVED | Noted: 2020-12-18 | Resolved: 2024-11-18

## 2024-11-18 PROBLEM — M79.10 MUSCLE PAIN: Status: RESOLVED | Noted: 2024-05-08 | Resolved: 2024-11-18

## 2024-11-18 NOTE — ASSESSMENT & PLAN NOTE
Chronic, severely low, chronic, to continue with folic acid, to eat fruits and vegetables  Lab Results   Component Value Date    FOLATE 2.4 (L) 05/13/2024         Orders:    folic acid (FOLVITE) 1 MG tablet; Take 1 tablet by mouth daily

## 2024-11-18 NOTE — ASSESSMENT & PLAN NOTE
Ongoing  Patient has history of cardiac arrest  Will do EKG and echo 2D    Orders:    EKG 12 Lead; Future    Echo (TTE) complete (PRN contrast/bubble/strain/3D); Future

## 2024-11-18 NOTE — ASSESSMENT & PLAN NOTE
Ongoing, chronic  To start stool softener, increase fluids, increase fiber in diet    Orders:    docusate sodium (COLACE) 100 MG capsule; Take 1 capsule by mouth 2 times daily For constipation

## 2024-11-20 ENCOUNTER — NURSE ONLY (OUTPATIENT)
Dept: FAMILY MEDICINE CLINIC | Age: 34
End: 2024-11-20
Payer: MEDICAID

## 2024-11-20 VITALS — WEIGHT: 176 LBS | TEMPERATURE: 98 F | BODY MASS INDEX: 30.2 KG/M2

## 2024-11-20 DIAGNOSIS — E53.8 VITAMIN B 12 DEFICIENCY: Primary | ICD-10-CM

## 2024-11-20 PROCEDURE — 96372 THER/PROPH/DIAG INJ SC/IM: CPT | Performed by: FAMILY MEDICINE

## 2024-11-20 RX ORDER — CYANOCOBALAMIN 1000 UG/ML
1000 INJECTION, SOLUTION INTRAMUSCULAR; SUBCUTANEOUS ONCE
Status: COMPLETED | OUTPATIENT
Start: 2024-11-20 | End: 2024-11-20

## 2024-11-20 RX ADMIN — CYANOCOBALAMIN 1000 MCG: 1000 INJECTION, SOLUTION INTRAMUSCULAR; SUBCUTANEOUS at 16:12

## 2024-11-22 RX ORDER — ETONOGESTREL AND ETHINYL ESTRADIOL .12; .015 MG/D; MG/D
RING VAGINAL
OUTPATIENT
Start: 2024-11-22

## 2024-11-27 ENCOUNTER — OFFICE VISIT (OUTPATIENT)
Dept: OBGYN CLINIC | Age: 34
End: 2024-11-27
Payer: MEDICAID

## 2024-11-27 ENCOUNTER — HOSPITAL ENCOUNTER (OUTPATIENT)
Age: 34
Setting detail: SPECIMEN
Discharge: HOME | End: 2024-11-27

## 2024-11-27 VITALS
WEIGHT: 176 LBS | DIASTOLIC BLOOD PRESSURE: 78 MMHG | HEART RATE: 74 BPM | SYSTOLIC BLOOD PRESSURE: 122 MMHG | BODY MASS INDEX: 30.05 KG/M2 | HEIGHT: 64 IN

## 2024-11-27 DIAGNOSIS — Z01.419 WELL WOMAN EXAM: Primary | ICD-10-CM

## 2024-11-27 DIAGNOSIS — Z23 NEED FOR HPV VACCINATION: ICD-10-CM

## 2024-11-27 DIAGNOSIS — N89.8 VAGINAL ODOR: ICD-10-CM

## 2024-11-27 DIAGNOSIS — Z98.890 HISTORY OF LOOP ELECTRICAL EXCISION PROCEDURE (LEEP): ICD-10-CM

## 2024-11-27 DIAGNOSIS — N76.1 CHRONIC VAGINITIS: ICD-10-CM

## 2024-11-27 DIAGNOSIS — Z30.49 ENCOUNTER FOR SURVEILLANCE OF NUVARING: ICD-10-CM

## 2024-11-27 PROBLEM — B00.9 HSV (HERPES SIMPLEX VIRUS) INFECTION: Status: ACTIVE | Noted: 2024-11-27

## 2024-11-27 PROCEDURE — 99395 PREV VISIT EST AGE 18-39: CPT | Performed by: STUDENT IN AN ORGANIZED HEALTH CARE EDUCATION/TRAINING PROGRAM

## 2024-11-27 PROCEDURE — 90651 9VHPV VACCINE 2/3 DOSE IM: CPT | Performed by: STUDENT IN AN ORGANIZED HEALTH CARE EDUCATION/TRAINING PROGRAM

## 2024-11-27 PROCEDURE — 90471 IMMUNIZATION ADMIN: CPT | Performed by: STUDENT IN AN ORGANIZED HEALTH CARE EDUCATION/TRAINING PROGRAM

## 2024-11-27 RX ORDER — ZINC OXIDE 13 %
1 CREAM (GRAM) TOPICAL DAILY
Qty: 30 CAPSULE | Refills: 12 | Status: SHIPPED | OUTPATIENT
Start: 2024-11-27

## 2024-11-27 RX ORDER — ETONOGESTREL AND ETHINYL ESTRADIOL VAGINAL RING .015; .12 MG/D; MG/D
RING VAGINAL
Qty: 1 EACH | Refills: 12 | Status: SHIPPED | OUTPATIENT
Start: 2024-11-27

## 2024-11-27 NOTE — PROGRESS NOTES
Inhale 2 puffs into the lungs every 6 hours as needed for Wheezing or Shortness of Breath 5/13/24  Yes Igor Glez MD   gabapentin (NEURONTIN) 300 MG capsule Take 1 capsule by mouth daily. 11/9/23  Yes Anthony Montemayor MD   cloNIDine (CATAPRES) 0.1 MG tablet Take 1 tablet by mouth daily 11/9/23  Yes ProviderAnthony MD   amphetamine-dextroamphetamine (ADDERALL) 10 MG tablet Take 1 tablet by mouth 2 times daily. 11/9/23  Yes Anthony Montemayor MD   ibuprofen (ADVIL;MOTRIN) 800 MG tablet  7/12/22  Yes ProviderAnthony MD   SUBOXONE 8-2 MG FILM SL film PLACE 1 STRIP UNDER THE TONGUE TWICE DAILY 9/9/20  Yes Anthony Montemayor MD   hydrOXYzine (ATARAX) 25 MG tablet Take 1 tablet by mouth 3 times daily as needed for Anxiety 7/22/20  Yes Jani Kearney MD       FAMILY HISTORY:  Family History of Breast, Ovarian, Colon or Uterine Cancer: No   family history includes Diabetes in her brother and mother.    SOCIAL HISTORY:   reports that she has never smoked. She has never used smokeless tobacco. She reports that she does not currently use drugs after having used the following drugs: Cocaine. She reports that she does not drink alcohol.      VITALS:  Vitals:    11/27/24 1406   BP: 122/78   Site: Left Upper Arm   Position: Sitting   Cuff Size: Medium Adult   Pulse: 74   Weight: 79.8 kg (176 lb)   Height: 1.626 m (5' 4\")                                                                                                                                                                          PHYSICAL EXAM:   General Appearance: Appears healthy.  Alert; in no acute distress.  Pleasant.  Skin: Skin color, texture, turgor normal. No rashes or lesions.  HEENT: normocephalic and atraumatic  Respiratory: no conversational dyspnea  Cardiovascular: normal rate and regular rhythm  Breast:  (Chest): normal appearance, no masses or tenderness, No nipple retraction or dimpling, No nipple discharge or bleeding, No

## 2024-11-28 LAB
CANDIDA SPECIES: POSITIVE
GARDNERELLA VAGINALIS: POSITIVE
SOURCE: ABNORMAL
TRICHOMONAS: NEGATIVE

## 2024-12-01 RX ORDER — FLUCONAZOLE 150 MG/1
150 TABLET ORAL ONCE
Qty: 1 TABLET | Refills: 0 | Status: SHIPPED | OUTPATIENT
Start: 2024-12-01 | End: 2024-12-01

## 2024-12-01 RX ORDER — METRONIDAZOLE 500 MG/1
500 TABLET ORAL 2 TIMES DAILY
Qty: 14 TABLET | Refills: 0 | Status: SHIPPED | OUTPATIENT
Start: 2024-12-01 | End: 2024-12-08

## 2024-12-06 ENCOUNTER — NURSE ONLY (OUTPATIENT)
Dept: FAMILY MEDICINE CLINIC | Age: 34
End: 2024-12-06
Payer: MEDICAID

## 2024-12-06 DIAGNOSIS — E53.8 VITAMIN B 12 DEFICIENCY: Primary | ICD-10-CM

## 2024-12-06 PROCEDURE — 96372 THER/PROPH/DIAG INJ SC/IM: CPT | Performed by: FAMILY MEDICINE

## 2024-12-06 RX ORDER — CYANOCOBALAMIN 1000 UG/ML
1000 INJECTION, SOLUTION INTRAMUSCULAR; SUBCUTANEOUS ONCE
Status: COMPLETED | OUTPATIENT
Start: 2024-12-06 | End: 2024-12-06

## 2024-12-06 RX ADMIN — CYANOCOBALAMIN 1000 MCG: 1000 INJECTION, SOLUTION INTRAMUSCULAR; SUBCUTANEOUS at 14:42

## 2024-12-06 NOTE — PROGRESS NOTES
Diagnosis Orders   1. Vitamin B 12 deficiency  cyanocobalamin injection 1,000 mcg           Future Appointments   Date Time Provider Department Center   12/10/2024  3:15 PM Chitra Glez MD [facility]   1/2/2025  5:30 PM [facility] ULTRASOUND  [facility] Radiolog   1/8/2025  3:30 PM SCHEDULE, [facility] fp [facility] ECC DEP   1/29/2025  2:45 PM SCHEDULE, NURSE 2 [facility] OB/GYN [facility] OB/Gyn [facility]   2/12/2025  3:30 PM SCHEDULE, [email or web address] fp [facility] ECC DEP   3/5/2025  3:30 PM Jailyn Fung MD [facility] BS ECC DEP   5/21/2025  2:45 PM SCHEDULE, NURSE 2 [facility] OB/GYN [facility] OB/Gyn [facility]

## 2024-12-10 ENCOUNTER — OFFICE VISIT (OUTPATIENT)
Dept: GASTROENTEROLOGY | Age: 34
End: 2024-12-10
Payer: MEDICAID

## 2024-12-10 VITALS
DIASTOLIC BLOOD PRESSURE: 68 MMHG | SYSTOLIC BLOOD PRESSURE: 106 MMHG | WEIGHT: 176 LBS | TEMPERATURE: 98.1 F | BODY MASS INDEX: 30.21 KG/M2

## 2024-12-10 DIAGNOSIS — B18.2 CHRONIC HEPATITIS C WITHOUT HEPATIC COMA (HCC): Primary | ICD-10-CM

## 2024-12-10 DIAGNOSIS — R11.0 NAUSEA: ICD-10-CM

## 2024-12-10 DIAGNOSIS — K58.2 IRRITABLE BOWEL SYNDROME WITH BOTH CONSTIPATION AND DIARRHEA: ICD-10-CM

## 2024-12-10 DIAGNOSIS — K58.1 IRRITABLE BOWEL SYNDROME WITH CONSTIPATION: ICD-10-CM

## 2024-12-10 DIAGNOSIS — F12.90 MARIJUANA USE: ICD-10-CM

## 2024-12-10 PROCEDURE — 99214 OFFICE O/P EST MOD 30 MIN: CPT | Performed by: INTERNAL MEDICINE

## 2024-12-10 ASSESSMENT — ENCOUNTER SYMPTOMS
RECTAL PAIN: 0
ABDOMINAL DISTENTION: 0
BLOOD IN STOOL: 0
NAUSEA: 0
SHORTNESS OF BREATH: 0
ABDOMINAL PAIN: 0
VOMITING: 0
CHOKING: 0
VOICE CHANGE: 0
WHEEZING: 0
DIARRHEA: 0
SORE THROAT: 1
TROUBLE SWALLOWING: 0
COUGH: 0
ANAL BLEEDING: 0
CONSTIPATION: 0

## 2024-12-10 NOTE — PROGRESS NOTES
GI CLINIC FOLLOW UP    NTERVAL HISTORY:   No referring provider defined for this encounter.    Chief Complaint   Patient presents with    Results     Patient is here today for a f/u on labs/US results, pt last seen on 9/30/24 for chronic hep C, IBS, & Nausea.       1. Chronic hepatitis C without hepatic coma (HCC)    2. Nausea    3. Irritable bowel syndrome with both constipation and diarrhea    4. Marijuana use    5. Irritable bowel syndrome with constipation      This patient seen my office as a follow-up  She had workup done for hepatitis C  She was positive for antibodies apparently had hep C for long time  Recent workup revealed negative viral load and genotype is undetermined  She has history for drug use in the past currently sober on Suboxone and gabapentin  Followed by primary care MD  Has some constipation predominant IBS like symptoms  Patient has been complaining of some abdominal pains, off and on cramping  Also complains of abdominal bloating and gas  Has off and on nausea without any sig vomiting  No rectal bleeding or melena  Has no weight loss  Has some anxiety issues  Her ultrasound was reviewed with her  Of the blood workup was also reviewed with her    HISTORY OF PRESENT ILLNESS: Ms.Majesta KISHAN Neville is a 34 y.o. female with a past history remarkable for , referred for evaluation of   Chief Complaint   Patient presents with    Results     Patient is here today for a f/u on labs/US results, pt last seen on 9/30/24 for chronic hep C, IBS, & Nausea.   .    Past Medical,Family, and Social History reviewed and does contribute to the patient presenting condition.    Patient's PMH/PSH,SH,PSYCH Hx, MEDs, ALLERGIES, and ROS were all reviewed and updated in the appropriate sections.    PAST MEDICAL HISTORY:  Past Medical History:   Diagnosis Date    Abnormal Pap smear     Acute kidney failure (HCC)     Altered mental status 01/08/2023    Anemia     Anxiety     Asthma     Bipolar 1 disorder (HCC)

## 2025-01-07 ENCOUNTER — HOSPITAL ENCOUNTER (EMERGENCY)
Age: 35
Discharge: HOME | End: 2025-01-07
Attending: EMERGENCY MEDICINE
Payer: MEDICAID

## 2025-01-07 VITALS
OXYGEN SATURATION: 98 % | DIASTOLIC BLOOD PRESSURE: 76 MMHG | HEART RATE: 85 BPM | SYSTOLIC BLOOD PRESSURE: 128 MMHG | BODY MASS INDEX: 29.18 KG/M2 | WEIGHT: 170 LBS | TEMPERATURE: 99 F | RESPIRATION RATE: 14 BRPM

## 2025-01-07 DIAGNOSIS — Z71.1 CONCERN ABOUT STD IN FEMALE WITHOUT DIAGNOSIS: Primary | ICD-10-CM

## 2025-01-07 LAB
BILIRUB UR QL STRIP: NEGATIVE
CANDIDA SPECIES: NEGATIVE
CLARITY UR: CLEAR
COLOR UR: YELLOW
EPI CELLS #/AREA URNS HPF: NORMAL /HPF (ref 0–5)
GARDNERELLA VAGINALIS: NEGATIVE
GLUCOSE UR STRIP-MCNC: NEGATIVE MG/DL
HCG UR QL: NEGATIVE
HGB UR QL STRIP.AUTO: NEGATIVE
KETONES UR STRIP-MCNC: NEGATIVE MG/DL
LEUKOCYTE ESTERASE UR QL STRIP: NEGATIVE
NITRITE UR QL STRIP: NEGATIVE
PH UR STRIP: 6.5 [PH] (ref 5–8)
PROT UR STRIP-MCNC: NEGATIVE MG/DL
RBC #/AREA URNS HPF: NORMAL /HPF (ref 0–2)
SOURCE: NORMAL
SP GR UR STRIP: 1.01 (ref 1–1.03)
TRICHOMONAS: NEGATIVE
UROBILINOGEN UR STRIP-ACNC: NORMAL EU/DL (ref 0–1)
WBC #/AREA URNS HPF: NORMAL /HPF (ref 0–5)

## 2025-01-07 PROCEDURE — 87591 N.GONORRHOEAE DNA AMP PROB: CPT

## 2025-01-07 PROCEDURE — 81001 URINALYSIS AUTO W/SCOPE: CPT

## 2025-01-07 PROCEDURE — 2500000003 HC RX 250 WO HCPCS: Performed by: NURSE PRACTITIONER

## 2025-01-07 PROCEDURE — 99284 EMERGENCY DEPT VISIT MOD MDM: CPT

## 2025-01-07 PROCEDURE — 96372 THER/PROPH/DIAG INJ SC/IM: CPT

## 2025-01-07 PROCEDURE — 81025 URINE PREGNANCY TEST: CPT

## 2025-01-07 PROCEDURE — 87491 CHLMYD TRACH DNA AMP PROBE: CPT

## 2025-01-07 PROCEDURE — 87510 GARDNER VAG DNA DIR PROBE: CPT

## 2025-01-07 PROCEDURE — 87660 TRICHOMONAS VAGIN DIR PROBE: CPT

## 2025-01-07 PROCEDURE — 87480 CANDIDA DNA DIR PROBE: CPT

## 2025-01-07 PROCEDURE — 6360000002 HC RX W HCPCS: Performed by: NURSE PRACTITIONER

## 2025-01-07 RX ORDER — DOXYCYCLINE HYCLATE 100 MG
100 TABLET ORAL 2 TIMES DAILY
Qty: 20 TABLET | Refills: 0 | Status: SHIPPED | OUTPATIENT
Start: 2025-01-07 | End: 2025-01-17

## 2025-01-07 RX ADMIN — WATER 500 MG: 1 INJECTION INTRAMUSCULAR; INTRAVENOUS; SUBCUTANEOUS at 19:55

## 2025-01-07 ASSESSMENT — PAIN - FUNCTIONAL ASSESSMENT: PAIN_FUNCTIONAL_ASSESSMENT: NONE - DENIES PAIN

## 2025-01-07 ASSESSMENT — ENCOUNTER SYMPTOMS
SHORTNESS OF BREATH: 0
ABDOMINAL PAIN: 0
DIARRHEA: 0
NAUSEA: 0
VOMITING: 0
COLOR CHANGE: 0

## 2025-01-07 ASSESSMENT — LIFESTYLE VARIABLES
HOW OFTEN DO YOU HAVE A DRINK CONTAINING ALCOHOL: MONTHLY OR LESS
HOW MANY STANDARD DRINKS CONTAINING ALCOHOL DO YOU HAVE ON A TYPICAL DAY: 1 OR 2

## 2025-01-07 NOTE — ED PROVIDER NOTES
eMERGENCY dEPARTMENT eNCOUnter   Independent Attestation     Pt Name: Phyllis Neville  MRN: [id number]  Birthdate 1990  Date of evaluation: 1/7/25     Phyllis Neville is a 34 y.o. female with CC: Exposure to STD        This visit was performed by both a physician and an APC. I performed all aspects of the MDM as documented.      Jeniffer Stephens MD  Attending Emergency Physician              Jeniffer Stephens MD  01/07/25 [id number]

## 2025-01-08 ENCOUNTER — NURSE ONLY (OUTPATIENT)
Dept: FAMILY MEDICINE CLINIC | Age: 35
End: 2025-01-08
Payer: MEDICAID

## 2025-01-08 VITALS — BODY MASS INDEX: 29.18 KG/M2 | WEIGHT: 170 LBS

## 2025-01-08 DIAGNOSIS — E53.8 VITAMIN B 12 DEFICIENCY: Primary | ICD-10-CM

## 2025-01-08 PROCEDURE — 96372 THER/PROPH/DIAG INJ SC/IM: CPT | Performed by: FAMILY MEDICINE

## 2025-01-08 RX ORDER — CYANOCOBALAMIN 1000 UG/ML
1000 INJECTION, SOLUTION INTRAMUSCULAR; SUBCUTANEOUS ONCE
Status: COMPLETED | OUTPATIENT
Start: 2025-01-08 | End: 2025-01-08

## 2025-01-08 RX ADMIN — CYANOCOBALAMIN 1000 MCG: 1000 INJECTION, SOLUTION INTRAMUSCULAR; SUBCUTANEOUS at 16:02

## 2025-01-08 NOTE — RESULT ENCOUNTER NOTE
Addressed in ED, negative for GC chlamydia trichomonas BV and yeast, no UTI, negative pregnancy    Future Appointments  1/29/2025  2:45 PM    SCHEDULE, NURSE 2 [facility]        [facility]  2/12/2025  3:30 PM    SCHEDULE, [facility] ECC DEP  3/5/2025   3:30 PM    Jailyn Fung MD    [facility] DEP  5/21/2025  2:45 PM    SCHEDULE, NURSE 2 [facility]        [facility]  8/12/2025  3:30 PM    Chitra Glez MD         [facility]

## 2025-01-08 NOTE — ED PROVIDER NOTES
Team [facility] EMERGENCY DEPARTMENT  eMERGENCY dEPARTMENT eNCOUnter      Pt Name: Phyllis Neville  MRN: [id number]  Birthdate 1990  Date of evaluation: 1/7/2025  Provider: TOMY Polanco CNP    CHIEF COMPLAINT       Chief Complaint   Patient presents with    Exposure to STD         HISTORY OF PRESENT ILLNESS  (Location/Symptom, Timing/Onset, Context/Setting, Quality, Duration, Modifying Factors, Severity.)   Phyllis Neville is a 34 y.o. female who presents to the emergency department. C/o white vaginal discharge, dysuria. Onset was within the past few days. Her partner is also having white discharge. Denies fever, chills, abd pain. Rates her pain 0/10 currently.      Nursing Notes were reviewed.    ALLERGIES     Bee venom, Penicillins, Buspirone, Hydroxyzine, and Quetiapine    CURRENT MEDICATIONS       Previous Medications    ALBUTEROL SULFATE HFA (PROVENTIL HFA) 108 (90 BASE) MCG/ACT INHALER    Inhale 2 puffs into the lungs every 6 hours as needed for Wheezing or Shortness of Breath    AMPHETAMINE-DEXTROAMPHETAMINE (ADDERALL) 10 MG TABLET    Take 1 tablet by mouth 2 times daily.    BENZOYL PEROXIDE 10 % EXTERNAL WASH    Apply topically 2 times daily.    CLONIDINE (CATAPRES) 0.1 MG TABLET    Take 1 tablet by mouth daily    CYANOCOBALAMIN ([employer] VITAMIN B12) 1000 MCG TABLET    Take 1 tablet by mouth daily    DOCUSATE SODIUM (COLACE) 100 MG CAPSULE    Take 1 capsule by mouth 2 times daily For constipation    ETONOGESTREL-ETHINYL ESTRADIOL (ELURYNG) 0.12-0.015 MG/24HR VAGINAL RING    Insert one ring vaginally and leave in place for three weeks, then remove for one week.    FOLIC ACID (FOLVITE) 1 MG TABLET    Take 1 tablet by mouth daily    GABAPENTIN (NEURONTIN) 300 MG CAPSULE    Take 1 capsule by mouth daily.    HYDROXYZINE (ATARAX) 25 MG TABLET    Take 1 tablet by mouth 3 times daily as needed for Anxiety    IBUPROFEN (ADVIL;MOTRIN) 800 MG TABLET        KLOR-CON M10 10 MEQ EXTENDED RELEASE TABLET

## 2025-01-08 NOTE — PROGRESS NOTES
Diagnosis Orders   1. Vitamin B 12 deficiency  cyanocobalamin injection 1,000 mcg           Future Appointments   Date Time Provider Department Center   1/29/2025  2:45 PM SCHEDULE, NURSE 2 [facility] OB/GYN [facility] OB/Gyn [facility]   2/12/2025  3:30 PM SCHEDULE, [facility] ECC DEP   3/5/2025  3:30 PM Jailyn Fung MD [facility] DEP   5/21/2025  2:45 PM SCHEDULE, NURSE 2 [facility] OB/GYN [facility] OB/Gyn [facility]   8/12/2025  3:30 PM [facility]

## 2025-01-08 NOTE — ED PROVIDER NOTES
eMERGENCY dEPARTMENT eNCOUnter   Independent Attestation     Pt Name: Phyllis Neville  MRN: [id number]  Birthdate 1990  Date of evaluation: 1/7/25     Phyllis Neville is a 34 y.o. female with CC: Exposure to STD      Based on the medical record the care appears appropriate.  I was personally available for consultation in the Emergency Department.    Tray Grant MD  Attending Emergency Physician          Tray Grant MD  01/07/25 [id number]

## 2025-01-29 ENCOUNTER — NURSE ONLY (OUTPATIENT)
Dept: OBGYN CLINIC | Age: 35
End: 2025-01-29
Payer: MEDICAID

## 2025-01-29 VITALS
SYSTOLIC BLOOD PRESSURE: 108 MMHG | DIASTOLIC BLOOD PRESSURE: 62 MMHG | BODY MASS INDEX: 31.31 KG/M2 | WEIGHT: 182.4 LBS | HEART RATE: 74 BPM

## 2025-01-29 DIAGNOSIS — Z23 NEED FOR HPV VACCINATION: Primary | ICD-10-CM

## 2025-01-29 DIAGNOSIS — E87.6 HYPOKALEMIA: ICD-10-CM

## 2025-01-29 PROCEDURE — 90651 9VHPV VACCINE 2/3 DOSE IM: CPT | Performed by: CLINICAL NURSE SPECIALIST

## 2025-01-29 PROCEDURE — 90471 IMMUNIZATION ADMIN: CPT | Performed by: CLINICAL NURSE SPECIALIST

## 2025-01-29 RX ORDER — POTASSIUM CHLORIDE 750 MG/1
10 TABLET, EXTENDED RELEASE ORAL DAILY
Qty: 30 TABLET | Refills: 3 | Status: SHIPPED | OUTPATIENT
Start: 2025-01-29

## 2025-01-29 NOTE — TELEPHONE ENCOUNTER
Please Approve or Refuse.  Send to Pharmacy per Pt's Request:      Next Visit Date:  2/12/2025   Last Visit Date: 11/13/2024    Hemoglobin A1C (%)   Date Value   05/13/2024 3.9 (L)             ( goal A1C is < 7)   BP Readings from Last 3 Encounters:   01/07/25 128/76   12/10/24 106/68   11/27/24 122/78          (goal 120/80)  BUN   Date Value Ref Range Status   09/30/2024 12 6 - 20 mg/dL Final     Creatinine   Date Value Ref Range Status   09/30/2024 0.9 0.5 - 0.9 mg/dL Final     Potassium   Date Value Ref Range Status   09/30/2024 4.5 3.7 - 5.3 mmol/L Final

## 2025-01-29 NOTE — PROGRESS NOTES
Patient was given Gardasil in the Right Deltoid.  NDC# 9700-1337-16  LOT# [id number]  Exp date- 01/08/2025  Patient's last injection was 11/27/2024.  Patient's last annual exam was on 11/27/2024.  Patient tolerated well without difficulty.

## 2025-03-05 ENCOUNTER — OFFICE VISIT (OUTPATIENT)
Dept: FAMILY MEDICINE CLINIC | Age: 35
End: 2025-03-05

## 2025-03-05 VITALS
WEIGHT: 185 LBS | HEIGHT: 64 IN | HEART RATE: 96 BPM | OXYGEN SATURATION: 98 % | BODY MASS INDEX: 31.58 KG/M2 | SYSTOLIC BLOOD PRESSURE: 118 MMHG | DIASTOLIC BLOOD PRESSURE: 72 MMHG | TEMPERATURE: 98 F

## 2025-03-05 DIAGNOSIS — J01.80 ACUTE NON-RECURRENT SINUSITIS OF OTHER SINUS: ICD-10-CM

## 2025-03-05 DIAGNOSIS — E53.8 VITAMIN B 12 DEFICIENCY: ICD-10-CM

## 2025-03-05 DIAGNOSIS — B18.2 CHRONIC HEPATITIS C WITHOUT HEPATIC COMA (HCC): ICD-10-CM

## 2025-03-05 DIAGNOSIS — Z00.01 ENCOUNTER FOR WELL ADULT EXAM WITH ABNORMAL FINDINGS: Primary | ICD-10-CM

## 2025-03-05 DIAGNOSIS — E55.9 VITAMIN D DEFICIENCY: ICD-10-CM

## 2025-03-05 DIAGNOSIS — F31.30 BIPOLAR I DISORDER, MOST RECENT EPISODE DEPRESSED (HCC): ICD-10-CM

## 2025-03-05 DIAGNOSIS — J20.9 ACUTE BRONCHITIS DUE TO INFECTION: ICD-10-CM

## 2025-03-05 DIAGNOSIS — Z23 ENCOUNTER FOR IMMUNIZATION: ICD-10-CM

## 2025-03-05 DIAGNOSIS — R56.9 SEIZURE (HCC): ICD-10-CM

## 2025-03-05 DIAGNOSIS — B35.1 TINEA UNGUIUM: ICD-10-CM

## 2025-03-05 DIAGNOSIS — R53.82 CHRONIC FATIGUE: ICD-10-CM

## 2025-03-05 DIAGNOSIS — H53.9 ABNORMAL VISION: ICD-10-CM

## 2025-03-05 RX ORDER — MUPIROCIN 20 MG/G
OINTMENT TOPICAL
Qty: 30 G | Refills: 3 | Status: SHIPPED | OUTPATIENT
Start: 2025-03-05

## 2025-03-05 RX ORDER — ALBUTEROL SULFATE 90 UG/1
2 INHALANT RESPIRATORY (INHALATION) EVERY 6 HOURS PRN
Qty: 8 G | Refills: 0 | Status: SHIPPED | OUTPATIENT
Start: 2025-03-05

## 2025-03-05 RX ORDER — GUAIFENESIN 600 MG/1
600 TABLET, EXTENDED RELEASE ORAL 2 TIMES DAILY PRN
Qty: 60 TABLET | Refills: 0 | Status: SHIPPED | OUTPATIENT
Start: 2025-03-05

## 2025-03-05 RX ORDER — AZITHROMYCIN 250 MG/1
TABLET, FILM COATED ORAL
Qty: 6 TABLET | Refills: 0 | Status: SHIPPED | OUTPATIENT
Start: 2025-03-05 | End: 2025-03-10

## 2025-03-05 RX ORDER — CICLOPIROX 80 MG/ML
SOLUTION TOPICAL
Qty: 6 ML | Refills: 1 | Status: SHIPPED | OUTPATIENT
Start: 2025-03-05

## 2025-03-05 RX ORDER — ERGOCALCIFEROL 1.25 MG/1
50000 CAPSULE, LIQUID FILLED ORAL WEEKLY
Qty: 12 CAPSULE | Refills: 0 | Status: SHIPPED | OUTPATIENT
Start: 2025-03-05

## 2025-03-05 RX ORDER — CYANOCOBALAMIN 1000 UG/ML
1000 INJECTION, SOLUTION INTRAMUSCULAR; SUBCUTANEOUS ONCE
Status: COMPLETED | OUTPATIENT
Start: 2025-03-05 | End: 2025-03-05

## 2025-03-05 RX ADMIN — CYANOCOBALAMIN 1000 MCG: 1000 INJECTION, SOLUTION INTRAMUSCULAR; SUBCUTANEOUS at 16:22

## 2025-03-05 SDOH — ECONOMIC STABILITY: FOOD INSECURITY: WITHIN THE PAST 12 MONTHS, THE FOOD YOU BOUGHT JUST DIDN'T LAST AND YOU DIDN'T HAVE MONEY TO GET MORE.: PATIENT DECLINED

## 2025-03-05 SDOH — ECONOMIC STABILITY: FOOD INSECURITY: WITHIN THE PAST 12 MONTHS, YOU WORRIED THAT YOUR FOOD WOULD RUN OUT BEFORE YOU GOT MONEY TO BUY MORE.: PATIENT DECLINED

## 2025-03-05 ASSESSMENT — ENCOUNTER SYMPTOMS
SINUS PAIN: 1
COUGH: 1
WHEEZING: 0
CONSTIPATION: 1
ABDOMINAL PAIN: 0
RHINORRHEA: 1
ABDOMINAL DISTENTION: 0
BLOOD IN STOOL: 0
VOMITING: 0
BACK PAIN: 0
DIARRHEA: 0
NAUSEA: 0
SHORTNESS OF BREATH: 0
SINUS PRESSURE: 1
CHEST TIGHTNESS: 0

## 2025-03-05 ASSESSMENT — PATIENT HEALTH QUESTIONNAIRE - PHQ9
10. IF YOU CHECKED OFF ANY PROBLEMS, HOW DIFFICULT HAVE THESE PROBLEMS MADE IT FOR YOU TO DO YOUR WORK, TAKE CARE OF THINGS AT HOME, OR GET ALONG WITH OTHER PEOPLE: NOT DIFFICULT AT ALL
3. TROUBLE FALLING OR STAYING ASLEEP: NOT AT ALL
1. LITTLE INTEREST OR PLEASURE IN DOING THINGS: NOT AT ALL
SUM OF ALL RESPONSES TO PHQ QUESTIONS 1-9: 2
6. FEELING BAD ABOUT YOURSELF - OR THAT YOU ARE A FAILURE OR HAVE LET YOURSELF OR YOUR FAMILY DOWN: NOT AT ALL
9. THOUGHTS THAT YOU WOULD BE BETTER OFF DEAD, OR OF HURTING YOURSELF: NOT AT ALL
2. FEELING DOWN, DEPRESSED OR HOPELESS: NOT AT ALL
SUM OF ALL RESPONSES TO PHQ QUESTIONS 1-9: 2
SUM OF ALL RESPONSES TO PHQ QUESTIONS 1-9: 2
5. POOR APPETITE OR OVEREATING: NOT AT ALL
SUM OF ALL RESPONSES TO PHQ QUESTIONS 1-9: 2
7. TROUBLE CONCENTRATING ON THINGS, SUCH AS READING THE NEWSPAPER OR WATCHING TELEVISION: NOT AT ALL
4. FEELING TIRED OR HAVING LITTLE ENERGY: MORE THAN HALF THE DAYS
8. MOVING OR SPEAKING SO SLOWLY THAT OTHER PEOPLE COULD HAVE NOTICED. OR THE OPPOSITE, BEING SO FIGETY OR RESTLESS THAT YOU HAVE BEEN MOVING AROUND A LOT MORE THAN USUAL: NOT AT ALL

## 2025-03-05 NOTE — PROGRESS NOTES
Visit Information    Have you changed or started any medications since your last visit including any over-the-counter medicines, vitamins, or herbal medicines? yes -     Have you stopped taking any of your medications? Is so, why? -  no  Are you having any side effects from any of your medications? - no    Have you seen any other physician or provider since your last visit?  no   Have you had any other diagnostic tests since your last visit?  no   Have you been seen in the emergency room and/or had an admission in a hospital since we last saw you?  no   Have you had your routine dental cleaning in the past 6 months?  no     Do you have an active MyChart account? If no, what is the barrier?  Yes    Patient Care Team:  Jailyn Fung MD as PCP - General (Family Medicine)  Jailyn Fung MD as PCP - Empaneled Provider  Jani Kearney MD as Consulting Physician (Psychiatry)  Chitra Glez MD as Consulting Physician (Gastroenterology)  Yusuf Bland DO as Consulting Physician (Obstetrics & Gynecology)    Medical History Review  Past Medical, Family, and Social History reviewed and does contribute to the patient presenting condition    Health Maintenance   Topic Date Due    Varicella vaccine (1 of 2 - 13+ 2-dose series) Never done    Pneumococcal 0-49 years Vaccine (1 of 2 - PCV) Never done    Flu vaccine (1) 08/01/2024    COVID-19 Vaccine (1 - 2024-25 season) Never done    Hepatitis B vaccine (2 of 3 - 19+ 3-dose series) 12/11/2024    Hepatitis A vaccine (2 of 2 - Risk 2-dose series) 05/13/2025    HPV vaccine (3 - 3-dose SCDM series) 05/27/2025    Depression Monitoring  11/13/2025    DTaP/Tdap/Td vaccine (5 - Td or Tdap) 03/23/2027    Cervical cancer screen  07/13/2027    HIV screen  Completed    Hib vaccine  Aged Out    Polio vaccine  Aged Out    Meningococcal (ACWY) vaccine  Aged Out    Depression Screen  Discontinued               
as needed for Congestion, Disp-60 tablet, R-0Normal  Acute bronchitis due to infection  New, acute, failing to resolve  She has been experiencing symptoms for 3 weeks, including runny nose, congestion, and chest congestion. It is not contagious after 3 weeks. An antibiotic will be prescribed. An albuterol inhaler will be provided. A prescription for Mucinex 60 tablets will be sent to [facility] pharmacy.    -     ESTABLISHED, MOD MDM, 30-39 MIN [02190]  -     azithromycin (ZITHROMAX) 250 MG tablet; 500 mg orally on day one followed by 250 mg daily on days two through five, Disp-6 tablet, R-0Normal  -     albuterol sulfate HFA (PROVENTIL HFA) 108 (90 Base) MCG/ACT inhaler; Inhale 2 puffs into the lungs every 6 hours as needed for Wheezing or Shortness of Breath Okay to substitute, Disp-8 g, R-0Normal  -     guaiFENesin (MUCINEX) 600 MG extended release tablet; Take 1 tablet by mouth 2 times daily as needed for Congestion, Disp-60 tablet, R-0Normal  Chronic fatigue  Chronic, ongoing  Will do basic labs to rule out certain common medical conditions: hematologic, renal, hepatic, electrolyte imbalances, thyroid disorders.   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 This condition developed after a cardiac arrest in 2019 due to overdose.  Patient developed left arm palsy, and nerve damage and weakness in her legs and left

## 2025-03-05 NOTE — PATIENT INSTRUCTIONS
hands, brush your teeth twice a day, and wear a seat belt in the car.   Where can you learn more?  Go to [email or web address]/patientEd and enter P072 to learn more about \"Well Visit, Ages 18 to 65: Care Instructions.\"  Current as of: April 30, 2024  Content Version: 14.3  © 2024 [employer].   Care instructions adapted under license by [employer]. If you have questions about a medical condition or this instruction, always ask your healthcare professional. [employer] disclaims any warranty or liability for your use of this information.

## 2025-04-30 ENCOUNTER — CLINICAL SUPPORT (OUTPATIENT)
Dept: FAMILY MEDICINE CLINIC | Age: 35
End: 2025-04-30
Payer: MEDICAID

## 2025-04-30 VITALS — BODY MASS INDEX: 31.76 KG/M2 | WEIGHT: 185 LBS

## 2025-04-30 DIAGNOSIS — E53.8 VITAMIN B 12 DEFICIENCY: Primary | ICD-10-CM

## 2025-04-30 PROCEDURE — 96372 THER/PROPH/DIAG INJ SC/IM: CPT | Performed by: FAMILY MEDICINE

## 2025-04-30 RX ORDER — CYANOCOBALAMIN 1000 UG/ML
1000 INJECTION, SOLUTION INTRAMUSCULAR; SUBCUTANEOUS ONCE
Status: COMPLETED | OUTPATIENT
Start: 2025-04-30 | End: 2025-04-30

## 2025-04-30 RX ADMIN — CYANOCOBALAMIN 1000 MCG: 1000 INJECTION, SOLUTION INTRAMUSCULAR; SUBCUTANEOUS at 15:26

## 2025-05-01 NOTE — PROGRESS NOTES
Diagnosis Orders   1. Vitamin B 12 deficiency  cyanocobalamin injection 1,000 mcg           Future Appointments   Date Time Provider Department Center   5/14/2025  2:30 PM SCHEDULE, [facility] fp [facility] ECC DEP   5/21/2025  2:45 PM SCHEDULE, NURSE 2 [facility] OB/GYN [facility] OB/Gyn [facility]   6/16/2025  2:45 PM SCHEDULE, [facility] fp [facility] ECC DEP   8/12/2025  3:30 PM Chitra Glez MD [facility]   9/5/2025  3:30 PM Jailyn Fung MD fp [facility] ECC DEP   3/6/2026  1:30 PM Jailyn Fung MD fp [facility] ECC DEP       
Injection given. Tolerated well. No adverse reactions noted.   
Never

## 2025-05-22 DIAGNOSIS — E55.9 VITAMIN D DEFICIENCY: ICD-10-CM

## 2025-05-22 RX ORDER — ERGOCALCIFEROL 1.25 MG/1
50000 CAPSULE, LIQUID FILLED ORAL WEEKLY
Qty: 12 CAPSULE | Refills: 0 | Status: SHIPPED | OUTPATIENT
Start: 2025-05-22

## 2025-05-22 NOTE — TELEPHONE ENCOUNTER
Please Approve or Refuse.  Send to Pharmacy per Pt's Request:      Next Visit Date:  6/16/2025   Last Visit Date: 4/30/2025    Hemoglobin A1C (%)   Date Value   05/13/2024 3.9 (L)             ( goal A1C is < 7)   BP Readings from Last 3 Encounters:   03/05/25 118/72   01/29/25 108/62   01/07/25 128/76          (goal 120/80)  BUN   Date Value Ref Range Status   09/30/2024 12 6 - 20 mg/dL Final     Creatinine   Date Value Ref Range Status   09/30/2024 0.9 0.5 - 0.9 mg/dL Final     Potassium   Date Value Ref Range Status   09/30/2024 4.5 3.7 - 5.3 mmol/L Final

## 2025-06-17 DIAGNOSIS — L70.0 ACNE VULGARIS: ICD-10-CM

## 2025-06-17 RX ORDER — CLINDAMYCIN AND BENZOYL PEROXIDE 10; 50 MG/G; MG/G
GEL TOPICAL 2 TIMES DAILY
Qty: 50 G | Refills: 5 | Status: SHIPPED | OUTPATIENT
Start: 2025-06-17

## 2025-06-17 NOTE — TELEPHONE ENCOUNTER
Please Approve or Refuse.  Send to Pharmacy per Pt's Request:      Next Visit Date:  9/5/2025   Last Visit Date: 4/30/2025    Hemoglobin A1C (%)   Date Value   05/13/2024 3.9 (L)             ( goal A1C is < 7)   BP Readings from Last 3 Encounters:   03/05/25 118/72   01/29/25 108/62   01/07/25 128/76          (goal 120/80)  BUN   Date Value Ref Range Status   09/30/2024 12 6 - 20 mg/dL Final     Creatinine   Date Value Ref Range Status   09/30/2024 0.9 0.5 - 0.9 mg/dL Final     Potassium   Date Value Ref Range Status   09/30/2024 4.5 3.7 - 5.3 mmol/L Final

## 2025-06-23 DIAGNOSIS — E87.6 HYPOKALEMIA: ICD-10-CM

## 2025-06-23 RX ORDER — POTASSIUM CHLORIDE 750 MG/1
10 TABLET, EXTENDED RELEASE ORAL DAILY
Qty: 30 TABLET | Refills: 3 | Status: SHIPPED | OUTPATIENT
Start: 2025-06-23

## 2025-07-10 ENCOUNTER — TELEPHONE (OUTPATIENT)
Dept: GASTROENTEROLOGY | Age: 35
End: 2025-07-10

## 2025-07-10 NOTE — TELEPHONE ENCOUNTER
Writer called to reschedule 8/18/25 tarik Carlos dr is out of office - [facility]

## 2025-07-19 DIAGNOSIS — J20.9 ACUTE BRONCHITIS DUE TO INFECTION: ICD-10-CM

## 2025-07-19 DIAGNOSIS — E53.8 LOW FOLATE: ICD-10-CM

## 2025-07-19 DIAGNOSIS — K59.01 SLOW TRANSIT CONSTIPATION: ICD-10-CM

## 2025-07-21 RX ORDER — ALBUTEROL SULFATE 90 UG/1
2 INHALANT RESPIRATORY (INHALATION) EVERY 6 HOURS PRN
Qty: 6.7 EACH | Refills: 1 | Status: SHIPPED | OUTPATIENT
Start: 2025-07-21

## 2025-07-21 RX ORDER — FOLIC ACID 1 MG/1
1000 TABLET ORAL DAILY
Qty: 90 TABLET | Refills: 1 | Status: SHIPPED | OUTPATIENT
Start: 2025-07-21

## 2025-07-21 RX ORDER — POLYETHYLENE GLYCOL 3350 17 G/DOSE
POWDER (GRAM) ORAL
Qty: 238 G | Refills: 3 | Status: SHIPPED | OUTPATIENT
Start: 2025-07-21

## 2025-08-25 ENCOUNTER — OFFICE VISIT (OUTPATIENT)
Dept: GASTROENTEROLOGY | Age: 35
End: 2025-08-25
Payer: MEDICAID

## 2025-08-25 VITALS
DIASTOLIC BLOOD PRESSURE: 81 MMHG | BODY MASS INDEX: 30.21 KG/M2 | SYSTOLIC BLOOD PRESSURE: 129 MMHG | HEART RATE: 71 BPM | WEIGHT: 176 LBS

## 2025-08-25 DIAGNOSIS — F12.90 MARIJUANA USE: ICD-10-CM

## 2025-08-25 DIAGNOSIS — K58.2 IRRITABLE BOWEL SYNDROME WITH BOTH CONSTIPATION AND DIARRHEA: ICD-10-CM

## 2025-08-25 DIAGNOSIS — R11.0 NAUSEA: ICD-10-CM

## 2025-08-25 DIAGNOSIS — B18.2 CHRONIC HEPATITIS C WITHOUT HEPATIC COMA (HCC): Primary | ICD-10-CM

## 2025-08-25 DIAGNOSIS — K58.1 IRRITABLE BOWEL SYNDROME WITH CONSTIPATION: ICD-10-CM

## 2025-08-25 PROCEDURE — 99214 OFFICE O/P EST MOD 30 MIN: CPT | Performed by: INTERNAL MEDICINE

## 2025-08-25 ASSESSMENT — ENCOUNTER SYMPTOMS
SHORTNESS OF BREATH: 0
CONSTIPATION: 0
WHEEZING: 0
VOICE CHANGE: 0
RECTAL PAIN: 0
ANAL BLEEDING: 0
TROUBLE SWALLOWING: 0
SORE THROAT: 0
ABDOMINAL DISTENTION: 0
NAUSEA: 1
BLOOD IN STOOL: 0
VOMITING: 0
DIARRHEA: 0
ABDOMINAL PAIN: 1
COUGH: 0
CHOKING: 0

## (undated) DEVICE — SUTURE CHROMIC GUT NO 1 GS-25 36IN ABSRB MFIL CG905

## (undated) DEVICE — STERILE POLYISOPRENE POWDER-FREE SURGICAL GLOVES WITH EMOLLIENT COATING: Brand: PROTEXIS

## (undated) DEVICE — STAPLER SKIN L39MM DIA0.53MM CRWN 5.7MM S STL FIX HD PROX

## (undated) DEVICE — SOLUTION SOD CHL 0.9% 1000ML

## (undated) DEVICE — Z DUP USE 2522782 SOLUTION IRRIG 1000ML STRL H2O PLAS CONTAINER UROMATIC

## (undated) DEVICE — SUTURE VCRL SZ 2-0 L36IN ABSRB VLT L36MM CT-1 1/2 CIR J345H

## (undated) DEVICE — TOWEL SURG W16XL26IN WHT NONFENESTRATED ST 2 PER PK

## (undated) DEVICE — SUTURE PDS II SZ 1 L36IN ABSRB VLT CT L40MM 1/2 CIR TAPR Z359T

## (undated) DEVICE — SUTURE VCRL SZ 1 L36IN ABSRB VLT L36MM CT-1 1/2 CIR J347H

## (undated) DEVICE — KENDALL SCD EXPRESS SLEEVES, KNEE LENGTH, MEDIUM: Brand: KENDALL SCD